# Patient Record
Sex: MALE | Race: WHITE | NOT HISPANIC OR LATINO | Employment: FULL TIME | URBAN - METROPOLITAN AREA
[De-identification: names, ages, dates, MRNs, and addresses within clinical notes are randomized per-mention and may not be internally consistent; named-entity substitution may affect disease eponyms.]

---

## 2017-01-19 ENCOUNTER — LAB VISIT (OUTPATIENT)
Dept: LAB | Facility: HOSPITAL | Age: 52
End: 2017-01-19
Attending: NURSE PRACTITIONER
Payer: COMMERCIAL

## 2017-01-19 DIAGNOSIS — E78.49 OTHER HYPERLIPIDEMIA: ICD-10-CM

## 2017-01-19 LAB
ALBUMIN SERPL BCP-MCNC: 3.9 G/DL
ALP SERPL-CCNC: 65 U/L
ALT SERPL W/O P-5'-P-CCNC: 32 U/L
ANION GAP SERPL CALC-SCNC: 6 MMOL/L
AST SERPL-CCNC: 24 U/L
BILIRUB SERPL-MCNC: 0.8 MG/DL
BUN SERPL-MCNC: 14 MG/DL
CALCIUM SERPL-MCNC: 9 MG/DL
CHLORIDE SERPL-SCNC: 109 MMOL/L
CHOLEST/HDLC SERPL: 3.8 {RATIO}
CO2 SERPL-SCNC: 27 MMOL/L
CREAT SERPL-MCNC: 1 MG/DL
EST. GFR  (AFRICAN AMERICAN): >60 ML/MIN/1.73 M^2
EST. GFR  (NON AFRICAN AMERICAN): >60 ML/MIN/1.73 M^2
GLUCOSE SERPL-MCNC: 102 MG/DL
HDL/CHOLESTEROL RATIO: 26.1 %
HDLC SERPL-MCNC: 157 MG/DL
HDLC SERPL-MCNC: 41 MG/DL
LDLC SERPL CALC-MCNC: 102.2 MG/DL
NONHDLC SERPL-MCNC: 116 MG/DL
POTASSIUM SERPL-SCNC: 4.9 MMOL/L
PROT SERPL-MCNC: 6.7 G/DL
SODIUM SERPL-SCNC: 142 MMOL/L
TRIGL SERPL-MCNC: 69 MG/DL

## 2017-01-19 PROCEDURE — 80053 COMPREHEN METABOLIC PANEL: CPT

## 2017-01-19 PROCEDURE — 80061 LIPID PANEL: CPT

## 2017-01-19 PROCEDURE — 36415 COLL VENOUS BLD VENIPUNCTURE: CPT

## 2017-01-23 ENCOUNTER — TELEPHONE (OUTPATIENT)
Dept: CARDIOLOGY | Facility: CLINIC | Age: 52
End: 2017-01-23

## 2017-01-23 NOTE — TELEPHONE ENCOUNTER
Please notify Mr Jerrod labs drawn last week was stable, however LDL cholesterol was mildy elevated above target. Heart healthy diet, continue medications, follow in clinic in 3 months.

## 2017-01-23 NOTE — TELEPHONE ENCOUNTER
Pt returned my call. The patient has been notified of this information and all questions answered.

## 2017-04-04 ENCOUNTER — OFFICE VISIT (OUTPATIENT)
Dept: INTERNAL MEDICINE | Facility: CLINIC | Age: 52
End: 2017-04-04
Payer: COMMERCIAL

## 2017-04-04 ENCOUNTER — LAB VISIT (OUTPATIENT)
Dept: LAB | Facility: HOSPITAL | Age: 52
End: 2017-04-04
Attending: FAMILY MEDICINE
Payer: COMMERCIAL

## 2017-04-04 VITALS
WEIGHT: 189.38 LBS | HEIGHT: 70 IN | OXYGEN SATURATION: 97 % | SYSTOLIC BLOOD PRESSURE: 124 MMHG | DIASTOLIC BLOOD PRESSURE: 80 MMHG | HEART RATE: 87 BPM | BODY MASS INDEX: 27.11 KG/M2 | TEMPERATURE: 98 F

## 2017-04-04 DIAGNOSIS — E78.5 HYPERLIPIDEMIA, UNSPECIFIED HYPERLIPIDEMIA TYPE: ICD-10-CM

## 2017-04-04 DIAGNOSIS — I25.10 CORONARY ARTERY DISEASE INVOLVING NATIVE CORONARY ARTERY OF NATIVE HEART WITHOUT ANGINA PECTORIS: ICD-10-CM

## 2017-04-04 DIAGNOSIS — Z00.00 ANNUAL PHYSICAL EXAM: Primary | ICD-10-CM

## 2017-04-04 DIAGNOSIS — Z12.11 COLON CANCER SCREENING: ICD-10-CM

## 2017-04-04 DIAGNOSIS — Z00.00 ANNUAL PHYSICAL EXAM: ICD-10-CM

## 2017-04-04 DIAGNOSIS — L73.9 FOLLICULITIS OF PERINEUM: ICD-10-CM

## 2017-04-04 LAB
BASOPHILS # BLD AUTO: 0.04 K/UL
BASOPHILS NFR BLD: 0.6 %
DIFFERENTIAL METHOD: NORMAL
EOSINOPHIL # BLD AUTO: 0.4 K/UL
EOSINOPHIL NFR BLD: 5.5 %
ERYTHROCYTE [DISTWIDTH] IN BLOOD BY AUTOMATED COUNT: 13 %
HCT VFR BLD AUTO: 46.1 %
HGB BLD-MCNC: 15.4 G/DL
LYMPHOCYTES # BLD AUTO: 2 K/UL
LYMPHOCYTES NFR BLD: 29.2 %
MCH RBC QN AUTO: 30.6 PG
MCHC RBC AUTO-ENTMCNC: 33.4 %
MCV RBC AUTO: 92 FL
MONOCYTES # BLD AUTO: 0.5 K/UL
MONOCYTES NFR BLD: 7.2 %
NEUTROPHILS # BLD AUTO: 3.9 K/UL
NEUTROPHILS NFR BLD: 57.4 %
PLATELET # BLD AUTO: 254 K/UL
PMV BLD AUTO: 9.7 FL
RBC # BLD AUTO: 5.03 M/UL
WBC # BLD AUTO: 6.78 K/UL

## 2017-04-04 PROCEDURE — 80053 COMPREHEN METABOLIC PANEL: CPT

## 2017-04-04 PROCEDURE — 99999 PR PBB SHADOW E&M-EST. PATIENT-LVL V: CPT | Mod: PBBFAC,,,

## 2017-04-04 PROCEDURE — 99396 PREV VISIT EST AGE 40-64: CPT | Mod: 25,S$GLB,, | Performed by: FAMILY MEDICINE

## 2017-04-04 PROCEDURE — 85025 COMPLETE CBC W/AUTO DIFF WBC: CPT

## 2017-04-04 PROCEDURE — 99214 OFFICE O/P EST MOD 30 MIN: CPT | Mod: S$GLB,,, | Performed by: FAMILY MEDICINE

## 2017-04-04 PROCEDURE — 36415 COLL VENOUS BLD VENIPUNCTURE: CPT

## 2017-04-04 PROCEDURE — 1160F RVW MEDS BY RX/DR IN RCRD: CPT | Mod: S$GLB,,, | Performed by: FAMILY MEDICINE

## 2017-04-04 RX ORDER — AZITHROMYCIN 500 MG/1
TABLET, FILM COATED ORAL
Refills: 0 | COMMUNITY
Start: 2017-03-08 | End: 2017-04-04

## 2017-04-04 NOTE — MR AVS SNAPSHOT
[facility] Internal Medicine  [address]  [place] [address]  Phone: [phone]  Fax: [phone]                  Delon Elder   2017 10:40 AM   Office Visit    Description:  Male : 1965   Provider:  Hillary Dalal MD   Department:  [facility] - Internal Medicine           Reason for Visit     Establish Care           Diagnoses this Visit        Comments    Annual physical exam    -  Primary     Coronary artery disease involving native coronary artery of native heart without angina pectoris         Hyperlipidemia, unspecified hyperlipidemia type         Colon cancer screening         Folliculitis of perineum                To Do List           Future Appointments        Provider Department Dept Phone    2017 1:15 PM LAB, SAME DAY [facility] [phone]    2017 8:00 AM Kadeem Napier MD [facility] Cardiology [phone]      Goals (5 Years of Data)     None      Follow-Up and Disposition     Return in 1 year (on 2018).      [facility]     [facility] -  Assistance  Unless otherwise directed by your provider, please contact [facility], our nurse care line that is available for  assistance.     Registered nurses in the [facility] provide: appointment scheduling, clinical advisement, health education, and other advisory services.  Call: [phone] (toll free)               Medications           Message regarding Medications     Verify the changes and/or additions to your medication regime listed below are the same as discussed with your clinician today.  If any of these changes or additions are incorrect, please notify your healthcare provider.        STOP taking these medications     azithromycin (ZITHROMAX) 500 MG tablet TK 1 T PO QD           Verify that the below list of medications is an accurate representation of the medications you are currently taking.  If none reported, the  "list may be blank. If incorrect, please contact your healthcare provider. Carry this list with you in case of emergency.           Current Medications     aspirin (ECOTRIN) 81 MG EC tablet Take 1 tablet (81 mg total) by mouth once daily.    atorvastatin (LIPITOR) 40 MG tablet Take 0.5 tablets (20 mg total) by mouth every evening.    DIPHENHYDRAMINE HCL (BENADRYL ORAL) Take by mouth.           Clinical Reference Information           Your Vitals Were     BP Pulse Temp Height Weight SpO2    124/80 (BP Location: Left arm) 87 98 °F (36.7 °C) (Tympanic) 5' 9.5" (1.765 m) 85.9 kg (189 lb 6 oz) 97%    BMI                27.56 kg/m2          Blood Pressure          Most Recent Value    BP  124/80      Allergies as of 4/4/2017     Phenytoin Sodium Extended    Sulfa (Sulfonamide Antibiotics)      Immunizations Administered on Date of Encounter - 4/4/2017     None      Orders Placed During Today's Visit      Normal Orders This Visit    Ambulatory referral to Cardiology     Case request GI: COLONOSCOPY     Future Labs/Procedures Expected by Expires    CBC auto differential  4/4/2017 6/3/2018    Recurring Lab Work Interval Expires    Comprehensive metabolic panel  Every 12 Months until 8/20/2044 8/20/2044      Instructions      Atorvastatin tablets  What is this medicine?  ATORVASTATIN (a TORE va sta tin) is known as a HMG-CoA reductase inhibitor or 'statin'. It lowers the level of cholesterol and triglycerides in the blood. This drug may also reduce the risk of heart attack, stroke, or other health problems in patients with risk factors for heart disease. Diet and lifestyle changes are often used with this drug.  How should I use this medicine?  Take this medicine by mouth with a glass of water. Follow the directions on the prescription label. You can take this medicine with or without food. Take your doses at regular intervals. Do not take your medicine more often than directed.  Talk to your pediatrician regarding the use of " this medicine in children. While this drug may be prescribed for children as young as 10 years old for selected conditions, precautions do apply.  What side effects may I notice from receiving this medicine?  Side effects that you should report to your doctor or health care professional as soon as possible:  · allergic reactions like skin rash, itching or hives, swelling of the face, lips, or tongue  · dark urine  · fever  · joint pain  · muscle cramps, pain  · redness, blistering, peeling or loosening of the skin, including inside the mouth  · trouble passing urine or change in the amount of urine  · unusually weak or tired  · yellowing of eyes or skin  Side effects that usually do not require medical attention (report to your doctor or health care professional if they continue or are bothersome):  · constipation  · heartburn  · stomach gas, pain, upset  What may interact with this medicine?  Do not take this medicine with any of the following medications:  · red yeast rice  · telaprevir  · telithromycin  · voriconazole  This medicine may also interact with the following medications:  · alcohol  · antiviral medicines for HIV or AIDS  · boceprevir  · certain antibiotics like clarithromycin, erythromycin, troleandomycin  · certain medicines for cholesterol like fenofibrate or gemfibrozil  · cimetidine  · clarithromycin  · colchicine  · cyclosporine  · digoxin  · female hormones, like estrogens or progestins and birth control pills  · grapefruit juice  · medicines for fungal infections like fluconazole, itraconazole, ketoconazole  · niacin  · rifampin  · spironolactone  What if I miss a dose?  If you miss a dose, take it as soon as you can. If it is almost time for your next dose, take only that dose. Do not take double or extra doses.  Where should I keep my medicine?  Keep out of the reach of children.  Store at room temperature between 20 to 25 degrees C (68 to 77 degrees F). Throw away any unused medicine after  the expiration date.  What should I tell my health care provider before I take this medicine?  They need to know if you have any of these conditions:  · frequently drink alcoholic beverages  · history of stroke, TIA  · kidney disease  · liver disease  · muscle aches or weakness  · other medical condition  · an unusual or allergic reaction to atorvastatin, other medicines, foods, dyes, or preservatives  · pregnant or trying to get pregnant  · breast-feeding  What should I watch for while using this medicine?  Visit your doctor or health care professional for regular check-ups. You may need regular tests to make sure your liver is working properly.  Tell your doctor or health care professional right away if you get any unexplained muscle pain, tenderness, or weakness, especially if you also have a fever and tiredness. Your doctor or health care professional may tell you to stop taking this medicine if you develop muscle problems. If your muscle problems do not go away after stopping this medicine, contact your health care professional.  This drug is only part of a total heart-health program. Your doctor or a dietician can suggest a low-cholesterol and low-fat diet to help. Avoid alcohol and smoking, and keep a proper exercise schedule.  Do not use this drug if you are pregnant or breast-feeding. Serious side effects to an unborn child or to an infant are possible. Talk to your doctor or pharmacist for more information.  This medicine may affect blood sugar levels. If you have diabetes, check with your doctor or health care professional before you change your diet or the dose of your diabetic medicine.  If you are going to have surgery tell your health care professional that you are taking this drug.  Date Last Reviewed:   NOTE:This sheet is a summary. It may not cover all possible information. If you have questions about this medicine, talk to your doctor, pharmacist, or health care provider. Copyright© 2016 [employer]        Prevention Guidelines, Men Ages 50 to 64  Screening tests and vaccines are an important part of managing your health. Health counseling is essential, too. Below are guidelines for these, for men ages 50 to 64. Talk with your healthcare provider to make sure youre up-to-date on what you need.  Screening Who needs it How often   Alcohol misuse All men in this age group At routine exams   Blood pressure All men in this age group Every 2 years if your blood pressure is less than 120/80 mm Hg; yearly if your systolic blood pressure is 120 to 139 mm Hg, or your diastolic blood pressure reading is 80 to 89 mm Hg   Colorectal cancer All men in this age group Flexible sigmoidoscopy every 5 years, or colonoscopy every 10 years, or double-contrast barium enema every 5 years; yearly fecal occult blood test or fecal immunochemical test; or a stool DNA test as often as your healthcare provider advises; talk with your healthcare provider about which tests are best for you   Depression All men in this age group At routine exams   Type 2 diabetes or prediabetes All adults beginning at age 45 and adults without symptoms at any age who are overweight or obese and have 1 or more other risk factors for diabetes At least every 3 years   Hepatitis C Men at increased risk for infection - talk with your healthcare provider At routine exams   High cholesterol or triglycerides All men in this age group At least every 5 years   HIV Men at increased risk for infection - talk with your healthcare provider At routine exams   Lung cancer Adults age 55 to 80 who have smoked Yearly screening in smokers with 30 pack-year history of smoking or who quit within 15 years   Obesity All men in this age group At routine exams   Prostate cancer Starting at age 45, talk to healthcare provider about risks and benefits of digital rectal exam (VIRAJ) and prostate-specific antigen (PSA) screening1 At routine exams   Syphilis Men at increased risk  for infection - talk with your healthcare provider At routine exams   Tuberculosis Men at increased risk for infection - talk with your healthcare provider Ask your healthcare provider   Vision All men in this age group Ask your healthcare provider   Vaccine Who needs it How often   Chickenpox (varicella) All men in this age group who have no record of this infection or vaccine 2 doses; second dose should be given at least 4 weeks after the first dose   Hepatitis A Men at increased risk for infection - talk with your healthcare provider 2 doses given at least 6 months apart   Hepatitis B Men at increased risk for infection - talk with your healthcare provider 3 doses over 6 months; second dose should be given 1 month after the first dose; the third dose should be given at least 2 months after the second dose and at least 4 months after the first dose   Haemophilus influenzae Type B (HIB) Men at increased risk for infection - talk with your healthcare provider 1 to 3 doses   Influenza (flu) All men in this age group Once a year   Measles, mumps, rubella (MMR) Men in this age group through their late 50s who have no record of these infections or vaccines 1 or 2 dose; ask your healthcare provider   Meningococcal Men at increased risk for infection - talk with your healthcare provider 1 or more doses   Pneumococcal conjugate vaccine (PCV13) and pneumococcal polysaccharide vaccine (PPSV23) Men at increased risk for infection - talk with your healthcare provider PCV13: 1 dose ages 19 to 65 (protects against 13 types of pneumococcal bacteria)     PPSV23: 1 to 2 doses through age 64, or 1 dose at 65 or older (protects against 23 types of pneumococcal bacteria)      Tetanus/diphtheria/  pertussis (Td/Tdap) booster All men in this age group Td every 10 years, or a one-time dose of Tdap instead of a Td booster after age 18, then Td every 10 years   Zoster All men ages 60 and older 1 dose   Counseling Who needs it How often    Diet and exercise Men who are overweight or obese When diagnosed, and then at routine exams   Sexually transmitted infection prevention Men at increased risk for infection - talk with your healthcare provider At routine exams   Use of daily aspirin Men in this age group at risk for cardiovascular health problems At routine exams   Use of tobacco and the health affects it can cause All men in this age group Every visit   [address] [place] [address] [facility]  Date Last Reviewed: 3/30/2015  © 4641-1317 [employer]. [address] [place] [address], [place] [address]. All rights reserved. This information is not intended as a substitute for professional medical care. Always follow your healthcare professional's instructions.        Atorvastatin tablets  What is this medicine?  ATORVASTATIN (a TORE va sta tin) is known as a HMG-CoA reductase inhibitor or 'statin'. It lowers the level of cholesterol and triglycerides in the blood. This drug may also reduce the risk of heart attack, stroke, or other health problems in patients with risk factors for heart disease. Diet and lifestyle changes are often used with this drug.  How should I use this medicine?  Take this medicine by mouth with a glass of water. Follow the directions on the prescription label. You can take this medicine with or without food. Take your doses at regular intervals. Do not take your medicine more often than directed.  Talk to your pediatrician regarding the use of this medicine in children. While this drug may be prescribed for children as young as 10 years old for selected conditions, precautions do apply.  What side effects may I notice from receiving this medicine?  Side effects that you should report to your doctor or health care professional as soon as possible:  · allergic reactions like skin rash, itching or hives, swelling of the face, lips, or tongue  · dark urine  · fever  · joint pain  · muscle cramps, pain  · redness, blistering,  peeling or loosening of the skin, including inside the mouth  · trouble passing urine or change in the amount of urine  · unusually weak or tired  · yellowing of eyes or skin  Side effects that usually do not require medical attention (report to your doctor or health care professional if they continue or are bothersome):  · constipation  · heartburn  · stomach gas, pain, upset  What may interact with this medicine?  Do not take this medicine with any of the following medications:  · red yeast rice  · telaprevir  · telithromycin  · voriconazole  This medicine may also interact with the following medications:  · alcohol  · antiviral medicines for HIV or AIDS  · boceprevir  · certain antibiotics like clarithromycin, erythromycin, troleandomycin  · certain medicines for cholesterol like fenofibrate or gemfibrozil  · cimetidine  · clarithromycin  · colchicine  · cyclosporine  · digoxin  · female hormones, like estrogens or progestins and birth control pills  · grapefruit juice  · medicines for fungal infections like fluconazole, itraconazole, ketoconazole  · niacin  · rifampin  · spironolactone  What if I miss a dose?  If you miss a dose, take it as soon as you can. If it is almost time for your next dose, take only that dose. Do not take double or extra doses.  Where should I keep my medicine?  Keep out of the reach of children.  Store at room temperature between 20 to 25 degrees C (68 to 77 degrees F). Throw away any unused medicine after the expiration date.  What should I tell my health care provider before I take this medicine?  They need to know if you have any of these conditions:  · frequently drink alcoholic beverages  · history of stroke, TIA  · kidney disease  · liver disease  · muscle aches or weakness  · other medical condition  · an unusual or allergic reaction to atorvastatin, other medicines, foods, dyes, or preservatives  · pregnant or trying to get pregnant  · breast-feeding  What should I watch for  while using this medicine?  Visit your doctor or health care professional for regular check-ups. You may need regular tests to make sure your liver is working properly.  Tell your doctor or health care professional right away if you get any unexplained muscle pain, tenderness, or weakness, especially if you also have a fever and tiredness. Your doctor or health care professional may tell you to stop taking this medicine if you develop muscle problems. If your muscle problems do not go away after stopping this medicine, contact your health care professional.  This drug is only part of a total heart-health program. Your doctor or a dietician can suggest a low-cholesterol and low-fat diet to help. Avoid alcohol and smoking, and keep a proper exercise schedule.  Do not use this drug if you are pregnant or breast-feeding. Serious side effects to an unborn child or to an infant are possible. Talk to your doctor or pharmacist for more information.  This medicine may affect blood sugar levels. If you have diabetes, check with your doctor or health care professional before you change your diet or the dose of your diabetic medicine.  If you are going to have surgery tell your health care professional that you are taking this drug.  Date Last Reviewed:   NOTE:This sheet is a summary. It may not cover all possible information. If you have questions about this medicine, talk to your doctor, pharmacist, or health care provider. Copyright© 2016 [employer]             Language Assistance Services     ATTENTION: Language assistance services are available, free of charge. Please call [phone].      ATENCIÓN: Si habla shayne, tiene a bonilla disposición servicios gratuitos de asistencia lingüística. Llame al [phone].     [facility] Ý: N?u b?n nói Ti?ng Vi?t, có các d?ch v? h? tr? ngôn ng? mi?n phí dành cho b?n. G?i s? [phone].         [facility] complies with applicable Federal civil rights laws and does  not discriminate on the basis of race, color, national origin, age, disability, or sex.

## 2017-04-04 NOTE — PROGRESS NOTES
Delon Elder  04/04/2017  [id number]    Primary Doctor No  Patient Care Team:  Primary Doctor No as PCP - General  Harpreet Dougherty NP as Nurse Practitioner (Cardiology)  Kadeem Napier MD as Consulting Physician (Cardiology)    Has the patient seen any provider outside of the network since the last visit ? (no). If yes, HIPPA forms completed and records requested.      Visit Type:Flu like symptoms and annual exam to establish with PCP.    Chief Complaint:  Chief Complaint   Patient presents with    Establish Care   Sore throat and mild cold symptoms    History of Present Illness:  51 year old male, here to establish care with PCP, complianing of mild sore throat, cold symptoms which are improving, last week. .  He denies fever. He has no associated chills or vomiting. It has had a sensation that lymph nodes were enlarged. It has Not been worsening, but resolving. He has not taken any OTC meds for this.    He has history of CAD, with Stent in [facility] in 2015. Followed by Cards, Dr. Napier. He had labs in Jan of 2017, that shows , slighlty above goal He is on Lipitor. HAs muscle aches with the 40 mg dose, so does cut in half.  He is on an ASA a day. He is not on beta blocker due to lower resting heart rate in past.   Denies any Chest pain or SOB.    He also does report some scrotal swelling. He has some erythema, red bump, has been there 2 days to the area. He has slight burning on the skin. He denies nocturia. No family history of prostate cancer. No dysuria or discharge. Not currently sexually active, has not been for a couple years. Tried OTC antibacterial cream.     He has a history of substance abuse, did crack cocaine and ETOH, is recovered for over 7 years. Reports no IVD use. He reports screening for HIV and Hep C in rehab.      He has never had colon screen. Denies family history of colon cancer. He does have some constipation, which metamucil does help. No melena.     Denies Depression or  Anxiety.    Exercises regularly without anginal symptoms.       Screening Questionnaires:  Do you smoke ( no ) Smoking cessation counseled. See Assessment and Plan for any additional orders. - Quit 7 years ago.     In the last two weeks how often have you felt down, depressed, or hopeless ( no )    In the last two weeks how often have you had little interest or pleasure in doing  (no )    In the last two weeks how often have you been bothered by the following problems:  1. Feeling nervous, anxious, or on edge ( no )    2. Not being able to stop or control worrying ( no)    3. Worrying too much about different things ( no)    4. Trouble relaxing ( no )    5. Being so restless that it is hard to sit still  (no )    6. Becoming easily annoyed or irritable (no)    7. Feeling afraid as if something awful might happen (no )    How often do you have a drink containing Alcohol? denied     Do you exercise  (yes ) moderately active    Do you take a baby Aspirin daily ( yes)    Do you have an advance directive ( no ) The patient was given information regarding Living Will/Durable Power-of-    The following were reviewed: Active problem list, medication list, allergies, family history, social history, and Health Maintenance.     History:  Past Medical History:   Diagnosis Date    Acute coronary syndrome     CAD (coronary artery disease) 4/13/2015    Hyperlipidemia      Past Surgical History:   Procedure Laterality Date    APPENDECTOMY      CARDIAC CATHETERIZATION      CORONARY ANGIOPLASTY       Family History   Problem Relation Age of Onset    Hyperlipidemia Mother     Hyperlipidemia Father     Melanoma Neg Hx     Psoriasis Neg Hx     Lupus Neg Hx      Social History     Social History    Marital status: Single     Spouse name: N/A    Number of children: N/A    Years of education: N/A     Occupational History    electronic tech Other     [employer]     Social History Main Topics    Smoking status: Former  Smoker     Years: 0.00     Quit date: 4/13/2010    Smokeless tobacco: Never Used    Alcohol use No    Drug use: No    Sexual activity: Not on file     Other Topics Concern    Not on file     Social History Narrative    No narrative on file     Patient Active Problem List   Diagnosis    Acute MI, inferior wall, initial episode of care    ST elevation myocardial infarction (STEMI) of inferior wall    S/P coronary artery stent placement    Former smoker    Hyperlipidemia    CAD (coronary artery disease)    MI, old    Exposure to second hand smoke at work     Review of patient's allergies indicates:   Allergen Reactions    Phenytoin sodium extended Other (See Comments)    Sulfa (sulfonamide antibiotics)        Health Maintenance  Health Maintenance Topics with due status: Not Due       Topic Last Completion Date    Lipid Panel 01/19/2017     Health Maintenance Due   Topic Date Due    TETANUS VACCINE  07/16/1984    Colonoscopy  07/16/2016    Influenza Vaccine  08/01/2016       Medications:  Current Outpatient Prescriptions on File Prior to Visit   Medication Sig Dispense Refill    aspirin (ECOTRIN) 81 MG EC tablet Take 1 tablet (81 mg total) by mouth once daily. 30 tablet 11    atorvastatin (LIPITOR) 40 MG tablet Take 0.5 tablets (20 mg total) by mouth every evening. 30 tablet 6     No current facility-administered medications on file prior to visit.        Medications have been reviewed and reconciled with patient at visit today.    Barriers to medications present (no )    Adverse reactions to current medications (yes)    Over the counter medications reviewed (Yes) and if needed added to active Medication list.    Exam:  Vitals:    04/04/17 1040   BP: 124/80   Pulse: 87   Temp: 98 °F (36.7 °C)     Weight: 85.9 kg (189 lb 6 oz)   Body mass index is 27.56 kg/(m^2).    Review of Systems   Constitutional: Negative for chills, fever, malaise/fatigue and weight loss.   HENT: Negative for ear pain.     Eyes: Negative for discharge and redness.   Respiratory: Negative for cough, hemoptysis and sputum production.    Cardiovascular: Negative for chest pain, palpitations and orthopnea.   Gastrointestinal: Positive for constipation. Negative for abdominal pain, blood in stool, heartburn, nausea and vomiting.   Genitourinary: Negative for dysuria, flank pain, hematuria and urgency.        Scrotal Tenderness to skin   Musculoskeletal: Negative for myalgias and neck pain.   Skin: Negative for itching and rash.   Neurological: Negative for dizziness, tingling, focal weakness, weakness and headaches.   Psychiatric/Behavioral: Negative for depression and suicidal ideas. The patient is not nervous/anxious and does not have insomnia.        Physical Exam   Constitutional: He is oriented to person, place, and time. He appears well-developed and well-nourished. No distress.   HENT:   Head: Normocephalic and atraumatic.   Right Ear: External ear normal.   Left Ear: External ear normal.   Nose: Nose normal.   Mouth/Throat: Oropharynx is clear and moist. No oropharyngeal exudate.   Eyes: EOM are normal. Pupils are equal, round, and reactive to light. Left eye exhibits no discharge.   Neck: Normal range of motion. Neck supple. No tracheal deviation present. No thyromegaly present.   Cardiovascular: Normal rate, regular rhythm, S1 normal and S2 normal.   No extrasystoles are present. Exam reveals no friction rub.    No murmur heard.  Pulmonary/Chest: Breath sounds normal. No respiratory distress. He has no decreased breath sounds. He has no wheezes. He has no rhonchi.   Abdominal: Hernia confirmed negative in the right inguinal area and confirmed negative in the left inguinal area.   Genitourinary: Testes normal and penis normal. Cremasteric reflex is present. Right testis shows no swelling. Left testis shows no swelling. Circumcised.         Lymphadenopathy:     He has no cervical adenopathy.   Neurological: He is alert and  oriented to person, place, and time. No cranial nerve deficit.   Psychiatric: He has a normal mood and affect. His behavior is normal. Judgment and thought content normal.   Nursing note and vitals reviewed.      Laboratory Reviewed: (Yes)  Lab Results   Component Value Date    WBC 8.74 03/24/2015    HGB 14.8 03/24/2015    HCT 42.6 03/24/2015     03/24/2015    CHOL 157 01/19/2017    TRIG 69 01/19/2017    HDL 41 01/19/2017    ALT 32 01/19/2017    AST 24 01/19/2017     01/19/2017    K 4.9 01/19/2017     01/19/2017    CREATININE 1.0 01/19/2017    BUN 14 01/19/2017    CO2 27 01/19/2017    INR 0.9 03/22/2015       Assessment:  The primary encounter diagnosis was Annual physical exam. Diagnoses of Coronary artery disease involving native coronary artery of native heart without angina pectoris, Hyperlipidemia, unspecified hyperlipidemia type, Colon cancer screening, and Folliculitis of perineum were also pertinent to this visit.    Plan:  Annual physical exam  -     CBC auto differential; Future; Expected date: 4/4/17  -     Comprehensive metabolic panel; Standing   Lipids reviewed and up to date   Plan to schedule colon screen   Flu vaccine to be completed next season   Discussed health maintenance requirements for age related screening with patient    Coronary artery disease involving native coronary artery of native heart without angina pectoris  -     Comprehensive metabolic panel; Standing  -     Ambulatory referral to Cardiology   Lipids up to date   On ASA daily   Did not tolerate Beta Blocker due to lower pulse rate in past per chart review with Cards notes   No chest pain, lipids improved. Muscle aches with regular dose of lipitor   Diet counseled    Hyperlipidemia, unspecified hyperlipidemia type  -     Comprehensive metabolic panel; Standing  -     Ambulatory referral to Cardiology   Lipids up to date   Reviewed in chart   Checking LFTs for liver monitorin    Colon cancer screening  -     Case  request GI: COLONOSCOPY    OTC Fiber to help keep regular   Better diet      Folliculitis of perineum-   Self limited   OTC neosporin counseled daily   Return if not improved    Other orders  -     Cancel: Case request GI: COLONOSCOPY          Follow up: Return in 1 year (on 4/4/2018).      Care Plan/Goals: Reviewed Yes  Goals     None        Wellness Goals    Health Maintenance   Topic Date Due    TETANUS VACCINE  07/16/1984    Colonoscopy  07/16/2016    Influenza Vaccine  08/01/2016    Lipid Panel  01/19/2022     Health Maintenance Due   Topic Date Due    TETANUS VACCINE  07/16/1984    Colonoscopy  07/16/2016    Influenza Vaccine  08/01/2016       Goal of Exercise is 150 min a week. Walking/running    Encouraged to follow balanced diet, with daily servings of fresh fruits and vegetables.     Make sure to schedule all health maintenance appointments to achieve health care goals.  Annual check up is due every 12 months with your designated Provider/Care Team.     Patient under care of /Care Manager (No) Referral Sent (No)        After visit summary printed and given to patient upon discharge.  Patient goals and care plan are included in After visit summary.

## 2017-04-04 NOTE — PATIENT INSTRUCTIONS
Atorvastatin tablets  What is this medicine?  ATORVASTATIN (a TORE va sta tin) is known as a HMG-CoA reductase inhibitor or 'statin'. It lowers the level of cholesterol and triglycerides in the blood. This drug may also reduce the risk of heart attack, stroke, or other health problems in patients with risk factors for heart disease. Diet and lifestyle changes are often used with this drug.  How should I use this medicine?  Take this medicine by mouth with a glass of water. Follow the directions on the prescription label. You can take this medicine with or without food. Take your doses at regular intervals. Do not take your medicine more often than directed.  Talk to your pediatrician regarding the use of this medicine in children. While this drug may be prescribed for children as young as 10 years old for selected conditions, precautions do apply.  What side effects may I notice from receiving this medicine?  Side effects that you should report to your doctor or health care professional as soon as possible:  · allergic reactions like skin rash, itching or hives, swelling of the face, lips, or tongue  · dark urine  · fever  · joint pain  · muscle cramps, pain  · redness, blistering, peeling or loosening of the skin, including inside the mouth  · trouble passing urine or change in the amount of urine  · unusually weak or tired  · yellowing of eyes or skin  Side effects that usually do not require medical attention (report to your doctor or health care professional if they continue or are bothersome):  · constipation  · heartburn  · stomach gas, pain, upset  What may interact with this medicine?  Do not take this medicine with any of the following medications:  · red yeast rice  · telaprevir  · telithromycin  · voriconazole  This medicine may also interact with the following medications:  · alcohol  · antiviral medicines for HIV or AIDS  · boceprevir  · certain antibiotics like clarithromycin, erythromycin,  troleandomycin  · certain medicines for cholesterol like fenofibrate or gemfibrozil  · cimetidine  · clarithromycin  · colchicine  · cyclosporine  · digoxin  · female hormones, like estrogens or progestins and birth control pills  · grapefruit juice  · medicines for fungal infections like fluconazole, itraconazole, ketoconazole  · niacin  · rifampin  · spironolactone  What if I miss a dose?  If you miss a dose, take it as soon as you can. If it is almost time for your next dose, take only that dose. Do not take double or extra doses.  Where should I keep my medicine?  Keep out of the reach of children.  Store at room temperature between 20 to 25 degrees C (68 to 77 degrees F). Throw away any unused medicine after the expiration date.  What should I tell my health care provider before I take this medicine?  They need to know if you have any of these conditions:  · frequently drink alcoholic beverages  · history of stroke, TIA  · kidney disease  · liver disease  · muscle aches or weakness  · other medical condition  · an unusual or allergic reaction to atorvastatin, other medicines, foods, dyes, or preservatives  · pregnant or trying to get pregnant  · breast-feeding  What should I watch for while using this medicine?  Visit your doctor or health care professional for regular check-ups. You may need regular tests to make sure your liver is working properly.  Tell your doctor or health care professional right away if you get any unexplained muscle pain, tenderness, or weakness, especially if you also have a fever and tiredness. Your doctor or health care professional may tell you to stop taking this medicine if you develop muscle problems. If your muscle problems do not go away after stopping this medicine, contact your health care professional.  This drug is only part of a total heart-health program. Your doctor or a dietician can suggest a low-cholesterol and low-fat diet to help. Avoid alcohol and smoking, and keep  a proper exercise schedule.  Do not use this drug if you are pregnant or breast-feeding. Serious side effects to an unborn child or to an infant are possible. Talk to your doctor or pharmacist for more information.  This medicine may affect blood sugar levels. If you have diabetes, check with your doctor or health care professional before you change your diet or the dose of your diabetic medicine.  If you are going to have surgery tell your health care professional that you are taking this drug.  Date Last Reviewed:   NOTE:This sheet is a summary. It may not cover all possible information. If you have questions about this medicine, talk to your doctor, pharmacist, or health care provider. Copyright© 2016 [employer]        Prevention Guidelines, Men Ages 50 to 64  Screening tests and vaccines are an important part of managing your health. Health counseling is essential, too. Below are guidelines for these, for men ages 50 to 64. Talk with your healthcare provider to make sure youre up-to-date on what you need.  Screening Who needs it How often   Alcohol misuse All men in this age group At routine exams   Blood pressure All men in this age group Every 2 years if your blood pressure is less than 120/80 mm Hg; yearly if your systolic blood pressure is 120 to 139 mm Hg, or your diastolic blood pressure reading is 80 to 89 mm Hg   Colorectal cancer All men in this age group Flexible sigmoidoscopy every 5 years, or colonoscopy every 10 years, or double-contrast barium enema every 5 years; yearly fecal occult blood test or fecal immunochemical test; or a stool DNA test as often as your healthcare provider advises; talk with your healthcare provider about which tests are best for you   Depression All men in this age group At routine exams   Type 2 diabetes or prediabetes All adults beginning at age 45 and adults without symptoms at any age who are overweight or obese and have 1 or more other risk factors for diabetes At  least every 3 years   Hepatitis C Men at increased risk for infection - talk with your healthcare provider At routine exams   High cholesterol or triglycerides All men in this age group At least every 5 years   HIV Men at increased risk for infection - talk with your healthcare provider At routine exams   Lung cancer Adults age 55 to 80 who have smoked Yearly screening in smokers with 30 pack-year history of smoking or who quit within 15 years   Obesity All men in this age group At routine exams   Prostate cancer Starting at age 45, talk to healthcare provider about risks and benefits of digital rectal exam (VIRAJ) and prostate-specific antigen (PSA) screening1 At routine exams   Syphilis Men at increased risk for infection - talk with your healthcare provider At routine exams   Tuberculosis Men at increased risk for infection - talk with your healthcare provider Ask your healthcare provider   Vision All men in this age group Ask your healthcare provider   Vaccine Who needs it How often   Chickenpox (varicella) All men in this age group who have no record of this infection or vaccine 2 doses; second dose should be given at least 4 weeks after the first dose   Hepatitis A Men at increased risk for infection - talk with your healthcare provider 2 doses given at least 6 months apart   Hepatitis B Men at increased risk for infection - talk with your healthcare provider 3 doses over 6 months; second dose should be given 1 month after the first dose; the third dose should be given at least 2 months after the second dose and at least 4 months after the first dose   Haemophilus influenzae Type B (HIB) Men at increased risk for infection - talk with your healthcare provider 1 to 3 doses   Influenza (flu) All men in this age group Once a year   Measles, mumps, rubella (MMR) Men in this age group through their late 50s who have no record of these infections or vaccines 1 or 2 dose; ask your healthcare provider   Meningococcal  Men at increased risk for infection - talk with your healthcare provider 1 or more doses   Pneumococcal conjugate vaccine (PCV13) and pneumococcal polysaccharide vaccine (PPSV23) Men at increased risk for infection - talk with your healthcare provider PCV13: 1 dose ages 19 to 65 (protects against 13 types of pneumococcal bacteria)     PPSV23: 1 to 2 doses through age 64, or 1 dose at 65 or older (protects against 23 types of pneumococcal bacteria)      Tetanus/diphtheria/  pertussis (Td/Tdap) booster All men in this age group Td every 10 years, or a one-time dose of Tdap instead of a Td booster after age 18, then Td every 10 years   Zoster All men ages 60 and older 1 dose   Counseling Who needs it How often   Diet and exercise Men who are overweight or obese When diagnosed, and then at routine exams   Sexually transmitted infection prevention Men at increased risk for infection - talk with your healthcare provider At routine exams   Use of daily aspirin Men in this age group at risk for cardiovascular health problems At routine exams   Use of tobacco and the health affects it can cause All men in this age group Every visit   [address] [place] [address] [facility]  Date Last Reviewed: 3/30/2015  © 2920-5215 [employer]. [address] [place] [address]. All rights reserved. This information is not intended as a substitute for professional medical care. Always follow your healthcare professional's instructions.        Atorvastatin tablets  What is this medicine?  ATORVASTATIN (a TORE va sta tin) is known as a HMG-CoA reductase inhibitor or 'statin'. It lowers the level of cholesterol and triglycerides in the blood. This drug may also reduce the risk of heart attack, stroke, or other health problems in patients with risk factors for heart disease. Diet and lifestyle changes are often used with this drug.  How should I use this medicine?  Take this medicine by mouth with a glass of water. Follow  the directions on the prescription label. You can take this medicine with or without food. Take your doses at regular intervals. Do not take your medicine more often than directed.  Talk to your pediatrician regarding the use of this medicine in children. While this drug may be prescribed for children as young as 10 years old for selected conditions, precautions do apply.  What side effects may I notice from receiving this medicine?  Side effects that you should report to your doctor or health care professional as soon as possible:  · allergic reactions like skin rash, itching or hives, swelling of the face, lips, or tongue  · dark urine  · fever  · joint pain  · muscle cramps, pain  · redness, blistering, peeling or loosening of the skin, including inside the mouth  · trouble passing urine or change in the amount of urine  · unusually weak or tired  · yellowing of eyes or skin  Side effects that usually do not require medical attention (report to your doctor or health care professional if they continue or are bothersome):  · constipation  · heartburn  · stomach gas, pain, upset  What may interact with this medicine?  Do not take this medicine with any of the following medications:  · red yeast rice  · telaprevir  · telithromycin  · voriconazole  This medicine may also interact with the following medications:  · alcohol  · antiviral medicines for HIV or AIDS  · boceprevir  · certain antibiotics like clarithromycin, erythromycin, troleandomycin  · certain medicines for cholesterol like fenofibrate or gemfibrozil  · cimetidine  · clarithromycin  · colchicine  · cyclosporine  · digoxin  · female hormones, like estrogens or progestins and birth control pills  · grapefruit juice  · medicines for fungal infections like fluconazole, itraconazole, ketoconazole  · niacin  · rifampin  · spironolactone  What if I miss a dose?  If you miss a dose, take it as soon as you can. If it is almost time for your next dose, take only  that dose. Do not take double or extra doses.  Where should I keep my medicine?  Keep out of the reach of children.  Store at room temperature between 20 to 25 degrees C (68 to 77 degrees F). Throw away any unused medicine after the expiration date.  What should I tell my health care provider before I take this medicine?  They need to know if you have any of these conditions:  · frequently drink alcoholic beverages  · history of stroke, TIA  · kidney disease  · liver disease  · muscle aches or weakness  · other medical condition  · an unusual or allergic reaction to atorvastatin, other medicines, foods, dyes, or preservatives  · pregnant or trying to get pregnant  · breast-feeding  What should I watch for while using this medicine?  Visit your doctor or health care professional for regular check-ups. You may need regular tests to make sure your liver is working properly.  Tell your doctor or health care professional right away if you get any unexplained muscle pain, tenderness, or weakness, especially if you also have a fever and tiredness. Your doctor or health care professional may tell you to stop taking this medicine if you develop muscle problems. If your muscle problems do not go away after stopping this medicine, contact your health care professional.  This drug is only part of a total heart-health program. Your doctor or a dietician can suggest a low-cholesterol and low-fat diet to help. Avoid alcohol and smoking, and keep a proper exercise schedule.  Do not use this drug if you are pregnant or breast-feeding. Serious side effects to an unborn child or to an infant are possible. Talk to your doctor or pharmacist for more information.  This medicine may affect blood sugar levels. If you have diabetes, check with your doctor or health care professional before you change your diet or the dose of your diabetic medicine.  If you are going to have surgery tell your health care professional that you are taking this  drug.  Date Last Reviewed:   NOTE:This sheet is a summary. It may not cover all possible information. If you have questions about this medicine, talk to your doctor, pharmacist, or health care provider. Copyright© 2016 [employer]

## 2017-04-05 ENCOUNTER — TELEPHONE (OUTPATIENT)
Dept: INTERNAL MEDICINE | Facility: CLINIC | Age: 52
End: 2017-04-05

## 2017-04-05 LAB
ALBUMIN SERPL BCP-MCNC: 4 G/DL
ALP SERPL-CCNC: 72 U/L
ALT SERPL W/O P-5'-P-CCNC: 26 U/L
ANION GAP SERPL CALC-SCNC: 9 MMOL/L
AST SERPL-CCNC: 25 U/L
BILIRUB SERPL-MCNC: 0.6 MG/DL
BUN SERPL-MCNC: 14 MG/DL
CALCIUM SERPL-MCNC: 9.3 MG/DL
CHLORIDE SERPL-SCNC: 106 MMOL/L
CO2 SERPL-SCNC: 27 MMOL/L
CREAT SERPL-MCNC: 1.1 MG/DL
EST. GFR  (AFRICAN AMERICAN): >60 ML/MIN/1.73 M^2
EST. GFR  (NON AFRICAN AMERICAN): >60 ML/MIN/1.73 M^2
GLUCOSE SERPL-MCNC: 85 MG/DL
POTASSIUM SERPL-SCNC: 4.3 MMOL/L
PROT SERPL-MCNC: 6.9 G/DL
SODIUM SERPL-SCNC: 142 MMOL/L

## 2017-04-05 NOTE — TELEPHONE ENCOUNTER
----- Message from Hillary Dalal MD sent at 4/5/2017  2:46 PM CDT -----  Results are normal.  Notify patient  He has been scheduled with Cards, and should be expecting call for colon screen.    If no issues follow up in one year for annual check up.  Otherwise see me prn.

## 2017-04-05 NOTE — TELEPHONE ENCOUNTER
Patient/mother notified of results and recommendations. Advised to call the office as needed, patient verbalized understanding.

## 2017-04-06 RX ORDER — SODIUM, POTASSIUM,MAG SULFATES 17.5-3.13G
SOLUTION, RECONSTITUTED, ORAL ORAL
Qty: 354 ML | Refills: 0 | Status: SHIPPED | OUTPATIENT
Start: 2017-04-06 | End: 2019-03-16

## 2017-09-14 RX ORDER — ATORVASTATIN CALCIUM 40 MG/1
20 TABLET, FILM COATED ORAL NIGHTLY
Qty: 30 TABLET | Refills: 3 | Status: SHIPPED | OUTPATIENT
Start: 2017-09-14 | End: 2018-02-24 | Stop reason: SDUPTHER

## 2017-09-14 NOTE — TELEPHONE ENCOUNTER
----- Message from Jerrica Mead sent at 9/14/2017 10:55 AM CDT -----  Contact: pt mother jennifer  Please call pt mother at [phone]. Pt is needing a refill on atorvastatin 40 mg called to jeannine at [phone].

## 2017-09-14 NOTE — TELEPHONE ENCOUNTER
----- Message from Jayden Urbina sent at 9/14/2017 11:02 AM CDT -----  REFILLS:   Patient is requesting a medication refill.   RX name: atorvastatin  Strength: 40 mg  Directions: n/a   Pharmacy name:   [id number] - [place] - [address] AT [facility] [place]  [address]  [place] [address]  Phone: [phone] Fax: [phone]  :  Phone number where pt can be reached: Grace, mother, [phone]

## 2017-09-14 NOTE — TELEPHONE ENCOUNTER
Spoke with pt's mother, she says that he works out of town  I notified her that he can phone us to schedule an appt for his 11 yr colon screen, says that she will pass on the message  Notified rx sent

## 2017-09-15 RX ORDER — ATORVASTATIN CALCIUM 40 MG/1
TABLET, FILM COATED ORAL
Qty: 30 TABLET | Refills: 11 | Status: SHIPPED | OUTPATIENT
Start: 2017-09-15 | End: 2019-04-17 | Stop reason: SDUPTHER

## 2017-12-18 DIAGNOSIS — Z12.11 SCREEN FOR COLON CANCER: Primary | ICD-10-CM

## 2018-01-09 ENCOUNTER — OFFICE VISIT (OUTPATIENT)
Dept: FAMILY MEDICINE | Facility: CLINIC | Age: 53
End: 2018-01-09
Payer: COMMERCIAL

## 2018-01-09 ENCOUNTER — TELEPHONE (OUTPATIENT)
Dept: INTERNAL MEDICINE | Facility: CLINIC | Age: 53
End: 2018-01-09

## 2018-01-09 VITALS
OXYGEN SATURATION: 95 % | HEIGHT: 70 IN | WEIGHT: 192.81 LBS | DIASTOLIC BLOOD PRESSURE: 88 MMHG | TEMPERATURE: 97 F | SYSTOLIC BLOOD PRESSURE: 134 MMHG | HEART RATE: 78 BPM | BODY MASS INDEX: 27.6 KG/M2

## 2018-01-09 DIAGNOSIS — R07.9 CHEST PAIN, UNSPECIFIED TYPE: ICD-10-CM

## 2018-01-09 DIAGNOSIS — R68.89 FLU-LIKE SYMPTOMS: Primary | ICD-10-CM

## 2018-01-09 LAB
CTP QC/QA: YES
FLUAV AG NPH QL: NEGATIVE
FLUBV AG NPH QL: NEGATIVE

## 2018-01-09 PROCEDURE — 99999 PR PBB SHADOW E&M-EST. PATIENT-LVL III: CPT | Mod: PBBFAC,,, | Performed by: FAMILY MEDICINE

## 2018-01-09 PROCEDURE — 99214 OFFICE O/P EST MOD 30 MIN: CPT | Mod: S$GLB,,, | Performed by: FAMILY MEDICINE

## 2018-01-09 PROCEDURE — 87804 INFLUENZA ASSAY W/OPTIC: CPT | Mod: 59,QW,S$GLB, | Performed by: FAMILY MEDICINE

## 2018-01-09 PROCEDURE — 93005 ELECTROCARDIOGRAM TRACING: CPT | Mod: S$GLB,,, | Performed by: FAMILY MEDICINE

## 2018-01-09 PROCEDURE — 93010 ELECTROCARDIOGRAM REPORT: CPT | Mod: S$GLB,,, | Performed by: INTERNAL MEDICINE

## 2018-01-09 NOTE — PROGRESS NOTES
"Subjective:       Patient ID: Delon Elder is a 52 y.o. male.    Chief Complaint: Back Pain (across the shoulders) and Chest Congestion      HPI Comments:       Current Outpatient Prescriptions:     aspirin (ECOTRIN) 81 MG EC tablet, Take 1 tablet (81 mg total) by mouth once daily., Disp: 30 tablet, Rfl: 11    atorvastatin (LIPITOR) 40 MG tablet, TAKE ONE-HALF TABLET BY MOUTH EVERY EVENING, Disp: 30 tablet, Rfl: 11    DIPHENHYDRAMINE HCL (BENADRYL ORAL), Take by mouth., Disp: , Rfl:     sodium,potassium,mag sulfates (SUPREP BOWEL PREP KIT) 17.5-3.13-1.6 gram SolR, Use as directed., Disp: 354 mL, Rfl: 0      This my first time seeing this patient.  He has history of coronary artery disease and a history of alcoholism, but is alcohol free.  He is somewhat vague in his history telling.    Today he was concerned about pain in his neck and posterior shoulders bilaterally, as well as a "burning" feeling in his chest.  He has not had any exertional chest pain or angina.  He does not have frequent GERD symptoms.  Right now he is only complaining of aches in his neck and shoulders.  Over the last 24-36 hours she's had some sore throat, cough, sneezing, headache, chills, and sinus headache.  He's had some influenza contacts at work.  Today he called a "Tele-doc" who is was concerned that his burning sensation in his chest could represent pneumonia, and was told to go see a doctor in  person.  He's not taken anything for his symptoms so far       Back Pain   This is a new problem. The current episode started yesterday. The problem occurs constantly. The problem is unchanged. Pain location: Neck and posterior shoulders. The quality of the pain is described as aching. The pain is mild. Associated symptoms include chest pain, a fever and headaches. Pertinent negatives include no abdominal pain or weakness. He has tried nothing for the symptoms.     Review of Systems   Constitutional: Positive for fever. Negative for activity " "change and appetite change.   HENT: Positive for congestion, postnasal drip, rhinorrhea and sinus pressure. Negative for sore throat.    Respiratory: Negative for cough, chest tightness and shortness of breath.    Cardiovascular: Positive for chest pain.   Gastrointestinal: Negative for abdominal pain, diarrhea and nausea.   Genitourinary: Negative for difficulty urinating.   Musculoskeletal: Positive for neck pain and neck stiffness. Negative for arthralgias, back pain and myalgias.   Neurological: Positive for headaches. Negative for dizziness and weakness.   Hematological: Negative for adenopathy.       Objective:      Vitals:    01/09/18 1429   BP: 134/88   Pulse: 78   Temp: 96.5 °F (35.8 °C)   TempSrc: Tympanic   SpO2: 95%   Weight: 87.5 kg (192 lb 12.7 oz)   Height: 5' 9.5" (1.765 m)     Physical Exam   Constitutional: He is oriented to person, place, and time. He appears well-developed and well-nourished. No distress.   HENT:   Head: Normocephalic.   Mouth/Throat: No oropharyngeal exudate.   Neck: Normal range of motion and full passive range of motion without pain. Neck supple. Muscular tenderness present. No spinous process tenderness present. No neck rigidity. No thyromegaly present.   Cardiovascular: Normal rate, regular rhythm and normal heart sounds.    No murmur heard.  Pulmonary/Chest: Effort normal and breath sounds normal. He has no wheezes. He has no rales.   Abdominal: Soft. He exhibits no distension.   Musculoskeletal: He exhibits no edema.        Arms:  Lymphadenopathy:     He has no cervical adenopathy.   Neurological: He is alert and oriented to person, place, and time.   Skin: Skin is warm and dry. He is not diaphoretic.   Psychiatric: He has a normal mood and affect. His behavior is normal. Judgment and thought content normal.   Nursing note and vitals reviewed.      Assessment:       1. Flu-like symptoms    2. 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  No acute changes.  Pain is not typical for angina  Orders:  -     EKG 12-lead

## 2018-01-09 NOTE — TELEPHONE ENCOUNTER
----- Message from Nicole Pearl sent at 1/9/2018  1:52 PM CST -----  Contact: Grace Elder - Mother  She states that patient is congested, cough and his chest is hurting.  She wants to know if you could work him in today.   Call her at [phone].                                            garnica

## 2018-01-09 NOTE — TELEPHONE ENCOUNTER
Left message on pt's mother's VM that  is not in clinic on Tuesday's or Fridays and that we can put him on tomorrows schedule. And if his sxs get worse to take him to the nearest urgent care or E.R.

## 2018-02-25 RX ORDER — ATORVASTATIN CALCIUM 40 MG/1
TABLET, FILM COATED ORAL
Qty: 30 TABLET | Refills: 0 | Status: SHIPPED | OUTPATIENT
Start: 2018-02-25 | End: 2018-02-26 | Stop reason: SDUPTHER

## 2018-02-26 RX ORDER — ATORVASTATIN CALCIUM 40 MG/1
TABLET, FILM COATED ORAL
Qty: 30 TABLET | Refills: 0 | Status: SHIPPED | OUTPATIENT
Start: 2018-02-26 | End: 2018-04-13 | Stop reason: SDUPTHER

## 2018-02-26 NOTE — TELEPHONE ENCOUNTER
----- Message from Melania Canales sent at 2/26/2018 11:39 AM CST -----  Contact: pt's mom  She's calling to get a refill..    1. What is the name of the medication you are requesting? Atorvastatin  2. What is the dose? 40 mg  3. How do you take the medication? Orally, topically, etc? orally  4. How often do you take this medication? Once daily  5. Do you need a 30 day or 90 day supply? 30  6. How many refills are you requesting? 1  7. What is your preferred pharmacy and location of the pharmacy? [facility] on [address]-[phone]  8. Who can we contact with further questions? [phone] (home)

## 2018-04-09 ENCOUNTER — TELEPHONE (OUTPATIENT)
Dept: INTERNAL MEDICINE | Facility: CLINIC | Age: 53
End: 2018-04-09

## 2018-04-09 NOTE — TELEPHONE ENCOUNTER
----- Message from Iraida Dalal sent at 4/6/2018  2:30 PM CDT -----  Contact: pt [phone]  Pt called requesting a call back from Dr Dalal's office.     He showed for his annual visit apt (scheduled 1 year ago)   Notes edited by Dr Dalal in Jan 2018 for pt to see colon  (colonoscopy? Orders?)    Pt checked in for his 9 am apt yesterday (at 8:57 am)  Ginny Lanedros canceled check in at (9:45 am)  Actual apt was not cancelled, only the check in.    Pt can be reached at pt [phone]  Thanks  yina

## 2018-04-10 PROBLEM — S83.242A TEAR OF MEDIAL MENISCUS OF LEFT KNEE, CURRENT: Status: ACTIVE | Noted: 2018-02-27

## 2018-04-10 RX ORDER — NAPROXEN SODIUM 220 MG/1
81 TABLET, FILM COATED ORAL
COMMUNITY
End: 2024-02-23

## 2018-04-10 NOTE — PROGRESS NOTES
Delon Elder  04/11/2018  [id number]    Hillary Dalal MD  Patient Care Team:  Hillary Dalal MD as PCP - General (Family Medicine)  Harpreet Dougherty NP as Nurse Practitioner (Cardiology)  Kadeem Napier MD as Consulting Physician (Cardiology)  Has the patient seen any provider outside of the [facility] network since the last visit? (no). If yes, HIPPA forms completed and records requested.        Visit Type:Annual    Chief Complaint:  Chief Complaint   Patient presents with    Annual Exam       History of Present Illness:  He has history of CAD, with Stent in RCA in 2015. Followed by Cards, Dr. Napier.  He is on Lipitor. HAs muscle aches with the 40 mg dose, so does cut in half.  He is on an ASA a day. He is not on beta blocker due to lower resting heart rate in past.   Denies any Chest pain or SOB. Denies anginal sx.     Denies family history of colon cancer. His dad did have polyps. He does have some constipation, which metamucil does help. No melena.     Recently saw Ortho at [facility], records in care everywhere for medical meniscus of left knee tear. Did not need sx. He had MRI, but has resolved and has most function.    He said his Colon screen is not covered.     History:  Past Medical History:   Diagnosis Date    Acute coronary syndrome     CAD (coronary artery disease) 4/13/2015    Hyperlipidemia      Past Surgical History:   Procedure Laterality Date    APPENDECTOMY      CARDIAC CATHETERIZATION      CORONARY ANGIOPLASTY       Family History   Problem Relation Age of Onset    Hyperlipidemia Mother     Hyperlipidemia Father     Melanoma Neg Hx     Psoriasis Neg Hx     Lupus Neg Hx      Social History     Social History    Marital status: Single     Spouse name: N/A    Number of children: N/A    Years of education: N/A     Occupational History     Other     [employer]     Social History Main Topics    Smoking status: Former Smoker     Years: 0.00     Quit date: 4/13/2010     Smokeless tobacco: Never Used    Alcohol use No    Drug use: No    Sexual activity: Not on file     Other Topics Concern    Not on file     Social History Narrative    No narrative on file     Patient Active Problem List   Diagnosis    S/P coronary artery stent placement    Former smoker    Hyperlipidemia    CAD (coronary artery disease)    MI, old    Exposure to second hand smoke at work    Tear of medial meniscus of left knee, current     Review of patient's allergies indicates:   Allergen Reactions    Phenytoin sodium extended Other (See Comments)    Sulfa (sulfonamide antibiotics)        The following were reviewed at this visit: active problem list, medication list, allergies, family history, social history, and health maintenance.    Medications:  Current Outpatient Prescriptions on File Prior to Visit   Medication Sig Dispense Refill    atorvastatin (LIPITOR) 40 MG tablet TAKE ONE-HALF TABLET BY MOUTH EVERY EVENING 30 tablet 11    DIPHENHYDRAMINE HCL (BENADRYL ORAL) Take by mouth.      atorvastatin (LIPITOR) 40 MG tablet TAKE 1/2 TABLET(20 MG) BY MOUTH EVERY EVENING 30 tablet 0    sodium,potassium,mag sulfates (SUPREP BOWEL PREP KIT) 17.5-3.13-1.6 gram SolR Use as directed. 354 mL 0     No current facility-administered medications on file prior to visit.        Medications have been reviewed and reconciled with patient at this visit.  Barriers to medications present (no)    Adverse reactions to current medications (no)    Over the counter medications reviewed (Yes ), and if needed added to active Medication list at this visit.     Exam:  Wt Readings from Last 3 Encounters:   04/11/18 87.1 kg (192 lb 0.3 oz)   01/09/18 87.5 kg (192 lb 12.7 oz)   04/04/17 85.9 kg (189 lb 6 oz)     Temp Readings from Last 3 Encounters:   04/11/18 97.4 °F (36.3 °C) (Tympanic)   01/09/18 96.5 °F (35.8 °C) (Tympanic)   04/04/17 98 °F (36.7 °C) (Tympanic)     BP Readings from Last 3 Encounters:   04/11/18 118/84    01/09/18 134/88   04/04/17 124/80     Pulse Readings from Last 3 Encounters:   04/11/18 80   01/09/18 78   04/04/17 87     Body mass index is 27.95 kg/m².    Review of Systems   Constitutional: Negative.  Negative for chills and fever.   HENT: Negative.  Negative for congestion, sinus pain and sore throat.    Eyes: Negative for blurred vision and double vision.   Respiratory: Negative for cough, sputum production, shortness of breath and wheezing.    Cardiovascular: Negative for chest pain, palpitations and leg swelling.   Gastrointestinal: Negative for abdominal pain, constipation, diarrhea, heartburn, nausea and vomiting.   Genitourinary: Negative.    Musculoskeletal: Negative.    Skin: Negative.  Negative for rash.   Neurological: Negative.    Endo/Heme/Allergies: Negative.  Negative for polydipsia. Does not bruise/bleed easily.   Psychiatric/Behavioral: Negative for depression and substance abuse.         Physical Exam   Constitutional: He is oriented to person, place, and time. He appears well-developed and well-nourished. No distress.   HENT:   Head: Normocephalic and atraumatic.   Right Ear: External ear normal.   Left Ear: External ear normal.   Nose: Nose normal.   Mouth/Throat: Oropharynx is clear and moist. No oropharyngeal exudate.   Eyes: Conjunctivae and EOM are normal. Pupils are equal, round, and reactive to light. Right eye exhibits no discharge. Left eye exhibits no discharge.   Neck: Normal range of motion. Neck supple. No thyromegaly present.   Cardiovascular: Normal rate, regular rhythm, normal heart sounds and intact distal pulses.    No murmur heard.  Pulmonary/Chest: Effort normal and breath sounds normal. No respiratory distress. He has no wheezes.   Abdominal: Soft. Bowel sounds are normal. He exhibits no distension and no mass. There is no tenderness.   Musculoskeletal: Normal range of motion. He exhibits no edema.   Lymphadenopathy:     He has no cervical adenopathy.   Neurological: He  is alert and oriented to person, place, and time. No cranial nerve deficit.   Skin: Capillary refill takes less than 2 seconds. He is not diaphoretic.   Psychiatric: He has a normal mood and affect. His behavior is normal. Judgment and thought content normal.   Nursing note and vitals reviewed.      Laboratory Reviewed ({Yes)  Lab Results   Component Value Date    WBC 6.78 04/04/2017    HGB 15.4 04/04/2017    HCT 46.1 04/04/2017     04/04/2017    CHOL 157 01/19/2017    TRIG 69 01/19/2017    HDL 41 01/19/2017    ALT 26 04/04/2017    AST 25 04/04/2017     04/04/2017    K 4.3 04/04/2017     04/04/2017    CREATININE 1.1 04/04/2017    BUN 14 04/04/2017    CO2 27 04/04/2017    INR 0.9 03/22/2015       Assessment:  The primary encounter diagnosis was Annual physical exam. Diagnoses of Hyperlipidemia, unspecified hyperlipidemia type, MI, old, Coronary artery disease involving native coronary artery of native heart without angina pectoris, and Screen for colon cancer were also pertinent to this visit.     Plan     Annual physical exam  -     CBC auto differential; Future; Expected date: 04/10/2018  -     Comprehensive metabolic panel; Future; Expected date: 04/10/2018  -     Lipid panel; Future; Expected date: 04/10/2018    Hyperlipidemia, unspecified hyperlipidemia type   Lipitor   CHECK LFts   LDL goal <70    MI, old   On ASA daily   No chest pain    Coronary artery disease involving native coronary artery of native heart without angina pectoris    On ASA, Lipitor   BP in goal range    Screen for colon cancer   Colon screen is not covered   [employer]Kit ordered        Care Plan/Goals: Reviewed  (N/A)  Goals     None          Follow up: Follow-up in about 1 year (around 4/11/2019) for Medication Recheck.    After visit summary was printed and given to patient upon discharge today.  Patient goals and care plan are included in After Visit Summary.

## 2018-04-11 ENCOUNTER — OFFICE VISIT (OUTPATIENT)
Dept: INTERNAL MEDICINE | Facility: CLINIC | Age: 53
End: 2018-04-11
Payer: COMMERCIAL

## 2018-04-11 VITALS
WEIGHT: 192 LBS | HEIGHT: 70 IN | OXYGEN SATURATION: 96 % | TEMPERATURE: 97 F | DIASTOLIC BLOOD PRESSURE: 84 MMHG | BODY MASS INDEX: 27.49 KG/M2 | HEART RATE: 80 BPM | SYSTOLIC BLOOD PRESSURE: 118 MMHG

## 2018-04-11 DIAGNOSIS — I25.10 CORONARY ARTERY DISEASE INVOLVING NATIVE CORONARY ARTERY OF NATIVE HEART WITHOUT ANGINA PECTORIS: ICD-10-CM

## 2018-04-11 DIAGNOSIS — Z00.00 ANNUAL PHYSICAL EXAM: Primary | ICD-10-CM

## 2018-04-11 DIAGNOSIS — I25.2 MI, OLD: ICD-10-CM

## 2018-04-11 DIAGNOSIS — E78.5 HYPERLIPIDEMIA, UNSPECIFIED HYPERLIPIDEMIA TYPE: ICD-10-CM

## 2018-04-11 DIAGNOSIS — Z12.11 SCREEN FOR COLON CANCER: ICD-10-CM

## 2018-04-11 PROCEDURE — 99999 PR PBB SHADOW E&M-EST. PATIENT-LVL III: CPT | Mod: PBBFAC,,, | Performed by: FAMILY MEDICINE

## 2018-04-11 PROCEDURE — 99396 PREV VISIT EST AGE 40-64: CPT | Mod: S$GLB,,, | Performed by: FAMILY MEDICINE

## 2018-04-11 NOTE — PATIENT INSTRUCTIONS
Understanding Coronary Artery Disease (CAD)    To understand coronary artery disease (CAD), you need to know how your heart works. Your heart is a muscle that pumps blood throughout your body. To work right, your heart needs a steady supply of oxygen. It gets this oxygen from blood supplied by the coronary arteries.     Healthy artery   Healthy artery. When a coronary artery is healthy and has no blockages, blood flows through easily. Healthy arteries can easily supply the oxygen-rich blood your heart needs.     Damaged artery   Damaged artery. Coronary artery disease begins when damage to the artery lining leads to the buildup of fat-like substances and cholesterol along the artery wall. This is called plaque. This damage could be caused by things like high blood pressure or smoking. This plaque buildup begins to narrow the arteries carrying blood to the heart. This is called atherosclerosis,     Narrowed artery   Narrowed artery. As more plaque builds up, your artery has trouble supplying blood to your heart muscle when it needs it most, such as during exercise. You may not feel any symptoms when this happens. Or you may feel angina--pressure, tightness, achiness, or pain in your chest, jaw, neck, back, or arm.     Blocked artery   Blocked artery. A piece of plaque may break off and completely block the artery. Or a blood clot may plug the narrowed artery. When this happens, blood flow is blocked from reaching the heart. Without oxygen-rich blood, part of the heart muscle becomes damaged and stops working. You may feel crushing pressure or pain in or around your chest. This is a heart attack (acute myocardial infarction, or AMI) and is a medical emergency.     Date Last Reviewed: 3/28/2016  © 5989-2562 [employer]. [address] [place] [address], [place] [address]. All rights reserved. This information is not intended as a substitute for professional medical care. Always follow your healthcare  professional's instructions.

## 2018-04-12 ENCOUNTER — LAB VISIT (OUTPATIENT)
Dept: LAB | Facility: HOSPITAL | Age: 53
End: 2018-04-12
Attending: FAMILY MEDICINE
Payer: COMMERCIAL

## 2018-04-12 DIAGNOSIS — Z00.00 ANNUAL PHYSICAL EXAM: ICD-10-CM

## 2018-04-12 LAB
ALBUMIN SERPL BCP-MCNC: 3.8 G/DL
ALP SERPL-CCNC: 72 U/L
ALT SERPL W/O P-5'-P-CCNC: 27 U/L
ANION GAP SERPL CALC-SCNC: 7 MMOL/L
AST SERPL-CCNC: 20 U/L
BASOPHILS # BLD AUTO: 0.05 K/UL
BASOPHILS NFR BLD: 0.9 %
BILIRUB SERPL-MCNC: 0.5 MG/DL
BUN SERPL-MCNC: 16 MG/DL
CALCIUM SERPL-MCNC: 9.1 MG/DL
CHLORIDE SERPL-SCNC: 109 MMOL/L
CHOLEST SERPL-MCNC: 176 MG/DL
CHOLEST/HDLC SERPL: 4.3 {RATIO}
CO2 SERPL-SCNC: 24 MMOL/L
CREAT SERPL-MCNC: 0.9 MG/DL
DIFFERENTIAL METHOD: NORMAL
EOSINOPHIL # BLD AUTO: 0.2 K/UL
EOSINOPHIL NFR BLD: 4.2 %
ERYTHROCYTE [DISTWIDTH] IN BLOOD BY AUTOMATED COUNT: 11.9 %
EST. GFR  (AFRICAN AMERICAN): >60 ML/MIN/1.73 M^2
EST. GFR  (NON AFRICAN AMERICAN): >60 ML/MIN/1.73 M^2
GLUCOSE SERPL-MCNC: 110 MG/DL
HCT VFR BLD AUTO: 43.2 %
HDLC SERPL-MCNC: 41 MG/DL
HDLC SERPL: 23.3 %
HGB BLD-MCNC: 14.8 G/DL
IMM GRANULOCYTES # BLD AUTO: 0.01 K/UL
IMM GRANULOCYTES NFR BLD AUTO: 0.2 %
LDLC SERPL CALC-MCNC: 118.8 MG/DL
LYMPHOCYTES # BLD AUTO: 1.4 K/UL
LYMPHOCYTES NFR BLD: 24.7 %
MCH RBC QN AUTO: 30.7 PG
MCHC RBC AUTO-ENTMCNC: 34.3 G/DL
MCV RBC AUTO: 90 FL
MONOCYTES # BLD AUTO: 0.6 K/UL
MONOCYTES NFR BLD: 9.6 %
NEUTROPHILS # BLD AUTO: 3.5 K/UL
NEUTROPHILS NFR BLD: 60.4 %
NONHDLC SERPL-MCNC: 135 MG/DL
NRBC BLD-RTO: 0 /100 WBC
PLATELET # BLD AUTO: 258 K/UL
PMV BLD AUTO: 9.7 FL
POTASSIUM SERPL-SCNC: 4.4 MMOL/L
PROT SERPL-MCNC: 6.6 G/DL
RBC # BLD AUTO: 4.82 M/UL
SODIUM SERPL-SCNC: 140 MMOL/L
TRIGL SERPL-MCNC: 81 MG/DL
WBC # BLD AUTO: 5.74 K/UL

## 2018-04-12 PROCEDURE — 80053 COMPREHEN METABOLIC PANEL: CPT

## 2018-04-12 PROCEDURE — 85025 COMPLETE CBC W/AUTO DIFF WBC: CPT

## 2018-04-12 PROCEDURE — 80061 LIPID PANEL: CPT

## 2018-04-12 PROCEDURE — 36415 COLL VENOUS BLD VENIPUNCTURE: CPT

## 2018-04-13 RX ORDER — ATORVASTATIN CALCIUM 40 MG/1
TABLET, FILM COATED ORAL
Qty: 30 TABLET | Refills: 5 | Status: SHIPPED | OUTPATIENT
Start: 2018-04-13 | End: 2019-03-16 | Stop reason: SDUPTHER

## 2018-04-18 ENCOUNTER — LAB VISIT (OUTPATIENT)
Dept: LAB | Facility: HOSPITAL | Age: 53
End: 2018-04-18
Attending: FAMILY MEDICINE
Payer: COMMERCIAL

## 2018-04-18 DIAGNOSIS — Z12.11 SCREEN FOR COLON CANCER: ICD-10-CM

## 2018-04-18 LAB — HEMOCCULT STL QL IA: NEGATIVE

## 2018-04-18 PROCEDURE — 82274 ASSAY TEST FOR BLOOD FECAL: CPT

## 2018-07-11 ENCOUNTER — TELEPHONE (OUTPATIENT)
Dept: INTERNAL MEDICINE | Facility: CLINIC | Age: 53
End: 2018-07-11

## 2018-07-11 RX ORDER — ACYCLOVIR 800 MG/1
TABLET ORAL
Refills: 0 | COMMUNITY
Start: 2018-06-05 | End: 2018-07-11

## 2018-07-11 RX ORDER — VALACYCLOVIR HYDROCHLORIDE 500 MG/1
500 TABLET, FILM COATED ORAL DAILY
Qty: 30 TABLET | Refills: 2 | Status: SHIPPED | OUTPATIENT
Start: 2018-07-11 | End: 2019-03-16

## 2018-07-11 NOTE — TELEPHONE ENCOUNTER
Ask him if he is speaking of a Genital Herpetic outbreak or cold sores?  Genital Herpes that are recurrent can get on suppressive tx, however we don't tx cold sore that way.          Dr. Dalal

## 2018-07-11 NOTE — TELEPHONE ENCOUNTER
Patient wanted to see about getting on the meds that he could take daily for hpv. He has been having some stress and with the heat he has had an incident.

## 2018-07-11 NOTE — TELEPHONE ENCOUNTER
----- Message from Alysia Llamas sent at 7/11/2018  1:43 PM CDT -----  Contact: pt  Calling in regards to please give him a call back [phone]

## 2018-07-11 NOTE — TELEPHONE ENCOUNTER
----- Message from Alysia Llamas sent at 7/11/2018  1:43 PM CDT -----  Contact: pt  Calling in regards to please give him a call back [phone]

## 2018-07-11 NOTE — TELEPHONE ENCOUNTER
Daily suppression for multiple outbreak, valtrex 500 daily to prevent more.  I will send valtrex in.

## 2018-10-26 ENCOUNTER — OFFICE VISIT (OUTPATIENT)
Dept: DERMATOLOGY | Facility: CLINIC | Age: 53
End: 2018-10-26
Payer: COMMERCIAL

## 2018-10-26 DIAGNOSIS — R22.9 SUBCUTANEOUS NODULE: Primary | ICD-10-CM

## 2018-10-26 DIAGNOSIS — L82.1 SEBORRHEIC KERATOSES: ICD-10-CM

## 2018-10-26 PROCEDURE — 99213 OFFICE O/P EST LOW 20 MIN: CPT | Mod: 25,S$GLB,, | Performed by: STUDENT IN AN ORGANIZED HEALTH CARE EDUCATION/TRAINING PROGRAM

## 2018-10-26 PROCEDURE — 99999 PR PBB SHADOW E&M-EST. PATIENT-LVL II: CPT | Mod: PBBFAC,,, | Performed by: STUDENT IN AN ORGANIZED HEALTH CARE EDUCATION/TRAINING PROGRAM

## 2018-10-26 PROCEDURE — 88304 TISSUE EXAM BY PATHOLOGIST: CPT | Performed by: PATHOLOGY

## 2018-10-26 PROCEDURE — 11100 PR BIOPSY OF SKIN LESION: CPT | Mod: 59,S$GLB,, | Performed by: STUDENT IN AN ORGANIZED HEALTH CARE EDUCATION/TRAINING PROGRAM

## 2018-10-26 PROCEDURE — 17110 DESTRUCTION B9 LES UP TO 14: CPT | Mod: S$GLB,,, | Performed by: STUDENT IN AN ORGANIZED HEALTH CARE EDUCATION/TRAINING PROGRAM

## 2018-10-26 PROCEDURE — 88304 TISSUE EXAM BY PATHOLOGIST: CPT | Mod: 26,,, | Performed by: PATHOLOGY

## 2018-10-26 NOTE — PROGRESS NOTES
Subjective:       Patient ID:  Delon Elder is a 53 y.o. male who presents for   Chief Complaint   Patient presents with    Spot     c/o spots on head x 3 months      History of Present Illness: The patient presents with chief complaint of a skin lesion on the forehead.  Location: face  Duration: 3month  Signs/Symptoms: no itch   Prior treatments: none     He denies any history of NMSC or MM. Denies any family history of NMSC or MM.     Patient complains of lesion(s) - subcutaneous nodule  Location: right trunk  Duration: years  Symptoms: irritation  Relieving factors/Previous treatments: none            Review of Systems   Skin: Positive for activity-related sunscreen use. Negative for itching, rash, dry skin, daily sunscreen use and recent sunburn.   Hematologic/Lymphatic: Does not bruise/bleed easily.        Objective:    Physical Exam   Constitutional: He appears well-developed and well-nourished. No distress.   Neurological: He is alert and oriented to person, place, and time. He is not disoriented.   Psychiatric: He has a normal mood and affect.   Skin:   Areas Examined (abnormalities noted in diagram):   Scalp / Hair Palpated and Inspected  Head / Face Inspection Performed  Neck Inspection Performed  Chest / Axilla Inspection Performed  Abdomen Inspection Performed  RUE Inspected  LUE Inspection Performed                   Diagram Legend     Erythematous scaling macule/papule c/w actinic keratosis       Vascular papule c/w angioma      Pigmented verrucoid papule/plaque c/w seborrheic keratosis      Yellow umbilicated papule c/w sebaceous hyperplasia      Irregularly shaped tan macule c/w lentigo     1-2 mm smooth white papules consistent with Milia      Movable subcutaneous cyst with punctum c/w epidermal inclusion cyst      Subcutaneous movable cyst c/w pilar cyst      Firm pink to brown papule c/w dermatofibroma      Pedunculated fleshy papule(s) c/w skin tag(s)      Evenly pigmented macule c/w  junctional nevus     Mildly variegated pigmented, slightly irregular-bordered macule c/w mildly atypical nevus      Flesh colored to evenly pigmented papule c/w intradermal nevus       Pink pearly papule/plaque c/w basal cell carcinoma      Erythematous hyperkeratotic cursted plaque c/w SCC      Surgical scar with no sign of skin cancer recurrence      Open and closed comedones      Inflammatory papules and pustules      Verrucoid papule consistent consistent with wart     Erythematous eczematous patches and plaques     Dystrophic onycholytic nail with subungual debris c/w onychomycosis     Umbilicated papule    Erythematous-base heme-crusted tan verrucoid plaque consistent with inflamed seborrheic keratosis     Erythematous Silvery Scaling Plaque c/w Psoriasis     See annotation      Assessment / Plan:      Pathology Orders:     Normal Orders This Visit    Tissue Specimen To Pathology, Dermatology     Questions:    Directional Terms:  Other(comment)    Clinical information:  r/o dilated pore of rosa    Specific Site:  right trunk        Subcutaneous nodule  -     Tissue Specimen To Pathology, Dermatology    Punch biopsy procedure note:  Punch biopsy performed after verbal consent obtained. Area marked and prepped with alcohol. Approximately 1cc of 1% lidocaine with epinephrine injected. 5 mm disposable punch used to remove lesion. Hemostasis obtained and biopsy site closed with 4 nylon sutures. Wound care instructions reviewed with patient and handout given.      Seborrheic keratoses  These are benign inherited growths without a malignant potential. Reassurance given to patient. No treatment is necessary.     Cryosurgery procedure note:    Verbal consent from the patient is obtained including, but not limited to, risk of hypopigmentation/hyperpigmentation, scar, recurrence of lesion. Liquid nitrogen cryosurgery is applied to 1 lesions to produce a freeze injury. The patient is aware that blisters may form and is  instructed on wound care with gentle cleansing and use of vaseline ointment to keep moist until healed. The patient is supplied a handout on cryosurgery and is instructed to call if lesions do not completely resolve.         Follow-up in about 2 weeks (around 11/9/2018) for suture removal.

## 2018-11-08 ENCOUNTER — TELEPHONE (OUTPATIENT)
Dept: DERMATOLOGY | Facility: CLINIC | Age: 53
End: 2018-11-08

## 2018-11-08 NOTE — TELEPHONE ENCOUNTER
----- Message from Gilma Charles sent at 11/8/2018 12:53 PM CST -----  Contact: self [phone]  Returning call, please call back [phone].  Venkata Cabrera

## 2018-11-19 ENCOUNTER — TELEPHONE (OUTPATIENT)
Dept: INTERNAL MEDICINE | Facility: CLINIC | Age: 53
End: 2018-11-19

## 2018-11-19 NOTE — TELEPHONE ENCOUNTER
Called and spoke with patient. He called because he went to Urgent care on yesterday with sinus issues. He said he has been having a drip. He is concerned with it going into his lungs. Patient said that they sent him in an inhaler but he hasn't picked it up yet. He said they did tell him to follow up and he doesn't get any better soon. He was worried because he meeting in a [employer] basement and a lot of times the people that come to the meeting are sick so he wants to make sure he is ok.

## 2018-11-19 NOTE — TELEPHONE ENCOUNTER
Appt with VICKY AREVALO tomorrow for recheck.  But if urgent care saw him, I would suggest filling those medications and giving that a try.  He can be seen tomorrow in office for Urgent Care follow up.

## 2018-11-19 NOTE — TELEPHONE ENCOUNTER
----- Message from Padmini Aggarwal sent at 11/19/2018 12:17 PM CST -----  Contact: pt  Please call pt @ [phone] regarding After Hour Clinic visit from yesterday , pt states he need a Xray of lung.

## 2019-03-16 ENCOUNTER — OFFICE VISIT (OUTPATIENT)
Dept: INTERNAL MEDICINE | Facility: CLINIC | Age: 54
End: 2019-03-16
Payer: COMMERCIAL

## 2019-03-16 ENCOUNTER — LAB VISIT (OUTPATIENT)
Dept: LAB | Facility: HOSPITAL | Age: 54
End: 2019-03-16
Attending: FAMILY MEDICINE
Payer: COMMERCIAL

## 2019-03-16 VITALS
DIASTOLIC BLOOD PRESSURE: 90 MMHG | SYSTOLIC BLOOD PRESSURE: 126 MMHG | TEMPERATURE: 97 F | OXYGEN SATURATION: 97 % | HEIGHT: 70 IN | WEIGHT: 193.31 LBS | HEART RATE: 75 BPM | BODY MASS INDEX: 27.67 KG/M2

## 2019-03-16 DIAGNOSIS — Z95.5 S/P CORONARY ARTERY STENT PLACEMENT: Chronic | ICD-10-CM

## 2019-03-16 DIAGNOSIS — I25.10 CORONARY ARTERY DISEASE INVOLVING NATIVE CORONARY ARTERY OF NATIVE HEART WITHOUT ANGINA PECTORIS: Chronic | ICD-10-CM

## 2019-03-16 DIAGNOSIS — J01.00 ACUTE NON-RECURRENT MAXILLARY SINUSITIS: Primary | ICD-10-CM

## 2019-03-16 DIAGNOSIS — I25.2 MI, OLD: ICD-10-CM

## 2019-03-16 DIAGNOSIS — R03.0 ELEVATED BLOOD PRESSURE READING WITHOUT DIAGNOSIS OF HYPERTENSION: ICD-10-CM

## 2019-03-16 DIAGNOSIS — S83.242A TEAR OF MEDIAL MENISCUS OF LEFT KNEE, CURRENT, UNSPECIFIED TEAR TYPE, INITIAL ENCOUNTER: ICD-10-CM

## 2019-03-16 LAB
ALBUMIN SERPL BCP-MCNC: 3.5 G/DL
ALP SERPL-CCNC: 78 U/L
ALT SERPL W/O P-5'-P-CCNC: 30 U/L
ANION GAP SERPL CALC-SCNC: 6 MMOL/L
AST SERPL-CCNC: 20 U/L
BILIRUB SERPL-MCNC: 0.3 MG/DL
BUN SERPL-MCNC: 13 MG/DL
CALCIUM SERPL-MCNC: 9 MG/DL
CHLORIDE SERPL-SCNC: 106 MMOL/L
CHOLEST SERPL-MCNC: 154 MG/DL
CHOLEST/HDLC SERPL: 4.3 {RATIO}
CO2 SERPL-SCNC: 26 MMOL/L
CREAT SERPL-MCNC: 0.9 MG/DL
EST. GFR  (AFRICAN AMERICAN): >60 ML/MIN/1.73 M^2
EST. GFR  (NON AFRICAN AMERICAN): >60 ML/MIN/1.73 M^2
GLUCOSE SERPL-MCNC: 84 MG/DL
HDLC SERPL-MCNC: 36 MG/DL
HDLC SERPL: 23.4 %
LDLC SERPL CALC-MCNC: 84.6 MG/DL
NONHDLC SERPL-MCNC: 118 MG/DL
POTASSIUM SERPL-SCNC: 4.3 MMOL/L
PROT SERPL-MCNC: 6.5 G/DL
SODIUM SERPL-SCNC: 138 MMOL/L
TRIGL SERPL-MCNC: 167 MG/DL

## 2019-03-16 PROCEDURE — 99999 PR PBB SHADOW E&M-EST. PATIENT-LVL III: CPT | Mod: PBBFAC,,, | Performed by: FAMILY MEDICINE

## 2019-03-16 PROCEDURE — 80053 COMPREHEN METABOLIC PANEL: CPT

## 2019-03-16 PROCEDURE — 36415 COLL VENOUS BLD VENIPUNCTURE: CPT

## 2019-03-16 PROCEDURE — 99999 PR PBB SHADOW E&M-EST. PATIENT-LVL III: ICD-10-PCS | Mod: PBBFAC,,, | Performed by: FAMILY MEDICINE

## 2019-03-16 PROCEDURE — 80061 LIPID PANEL: CPT

## 2019-03-16 PROCEDURE — 99214 OFFICE O/P EST MOD 30 MIN: CPT | Mod: S$GLB,,, | Performed by: FAMILY MEDICINE

## 2019-03-16 PROCEDURE — 99214 PR OFFICE/OUTPT VISIT, EST, LEVL IV, 30-39 MIN: ICD-10-PCS | Mod: S$GLB,,, | Performed by: FAMILY MEDICINE

## 2019-03-16 RX ORDER — PREDNISONE 20 MG/1
TABLET ORAL
Refills: 0 | COMMUNITY
Start: 2019-03-10 | End: 2019-03-16

## 2019-03-16 RX ORDER — BENZONATATE 200 MG/1
CAPSULE ORAL
Refills: 0 | COMMUNITY
Start: 2019-03-10 | End: 2019-10-02

## 2019-03-16 RX ORDER — AMOXICILLIN AND CLAVULANATE POTASSIUM 875; 125 MG/1; MG/1
TABLET, FILM COATED ORAL
Refills: 0 | COMMUNITY
Start: 2019-03-11 | End: 2019-03-16

## 2019-03-16 RX ORDER — AMOXICILLIN AND CLAVULANATE POTASSIUM 875; 125 MG/1; MG/1
1 TABLET, FILM COATED ORAL EVERY 12 HOURS
Qty: 14 TABLET | Refills: 0 | Status: SHIPPED | OUTPATIENT
Start: 2019-03-16 | End: 2019-03-23

## 2019-03-16 RX ORDER — PREDNISONE 5 MG/1
TABLET ORAL
Qty: 30 TABLET | Refills: 0 | Status: SHIPPED | OUTPATIENT
Start: 2019-03-16 | End: 2019-10-02

## 2019-03-16 NOTE — PROGRESS NOTES
CHIEF COMPLAINT  Sinus Problem      HISTORY OF PRESENT ILLNESS    This is my first time treating him here. All problems addressed today are NEW TO ME.  He is requesting that I take over as his primary care provider.     Problem List Items Addressed This Visit        ENT    Acute non-recurrent maxillary sinusitis - Primary    Current Assessment & Plan     Quality described as sinus pressure/discomfort.  Location predominantly in the region of the right maxillary sinus.  Associated symptoms include thick sinus drainage, nonproductive cough.  Onset was a little more than 2 weeks ago.  Severity described as moderate.  Alleviating factors include prednisone and Augmentin prescribed 6 days ago at outside urgent care clinic.  He says he completed 4 days of the prednisone (40 milligrams daily) and 4 days of the Augmentin, then he lost his prescription bottles.  He says he has gotten worse over the last couple of days.         Relevant Medications    amoxicillin-clavulanate 875-125mg (AUGMENTIN) 875-125 mg per tablet    predniSONE (DELTASONE) 5 MG tablet       Cardiac/Vascular    S/P coronary artery stent placement (Chronic)    Current Assessment & Plan     Compensated, stable, asymptomatic.He appears to be tolerating statin without apparent symptoms of myositis or hepatic dysfunction.          Relevant Orders    Lipid panel    Comprehensive metabolic panel    Coronary artery disease involving native coronary artery of native heart without angina pectoris (Chronic)    Current Assessment & Plan     Compensated, stable, asymptomatic.He appears to be tolerating statin without apparent symptoms of myositis or hepatic dysfunction.          Relevant Orders    Lipid panel    Comprehensive metabolic panel    MI, old    Current Assessment & Plan     Compensated, stable, asymptomatic.He appears to be tolerating statin without apparent symptoms of myositis or hepatic dysfunction.          Elevated blood pressure reading without  "diagnosis of hypertension    Current Assessment & Plan     Asymptomatic, mild elevation today.  He has not been on decongestants.  It was agreed to treat with therapeutic lifestyle, and he would return in 4 weeks for preventive services exam, at which time we would reassess his blood pressure.            Orthopedic    Tear of medial meniscus of left knee, current    Current Assessment & Plan     Improved. Tolerable. Does not want further intervention.                 REVIEW OF SYSTEMS  CONSTITUTIONAL: No objective fever reported.  PULMONARY: No hemoptysis or difficulty breathing reported.  CARDIOVASCULAR: No chest pain or orthopnea reported.     PHYSICAL EXAM  Vitals:    03/16/19 0820   BP: (!) 126/90   BP Location: Right arm   Patient Position: Sitting   BP Method: Medium (Automatic)   Pulse: 75   Temp: 97.4 °F (36.3 °C)   TempSrc: Tympanic   SpO2: 97%   Weight: 87.7 kg (193 lb 5.5 oz)   Height: 5' 9.5" (1.765 m)   CONSTITUTIONAL: Vital signs noted. No apparent distress. Does not appear acutely ill or septic. Appears adequately hydrated.  EYES: Pupils equal and reactive. Extraocular movements intact. Sclerae anicteric. Lids and conjunctiva unremarkable.  ENT: External ENT grossly unremarkable. Ear canals and visualized tympanic membranes are unremarkable. Hearing grossly intact. Nasal mucosa pink-blue and boggy. Mild tenderness with percussion of right maxillary sinus. Oropharynx moist with mild posterior cobblestoning but without lesion, inflammation or exudate. Posterior oropharynx is symmetric.  NECK: Neck supple without meningismus. Trachea midline. No significant cervical lymphadenopathy.  PULM: Lungs clear. Breathing unlabored.  HEART: Auscultation reveals regular rate and rhythm.  DERM: Skin warm and moist with normal tu rgor.    ASSESSMENT & PLAN (DIAGNOSES & ORDERS)  1. Acute non-recurrent maxillary sinusitis  amoxicillin-clavulanate 875-125mg (AUGMENTIN) 875-125 mg per tablet    predniSONE (DELTASONE) 5 " "MG tablet   2. MI, old     3. Coronary artery disease involving native coronary artery of native heart without angina pectoris  Lipid panel    Comprehensive metabolic panel   4. S/P coronary artery stent placement  Lipid panel    Comprehensive metabolic panel   5. Tear of medial meniscus of left knee, current, unspecified tear type, initial encounter     6. Elevated blood pressure reading without diagnosis of hypertension         Follow-up in about 4 weeks (around 4/15/2019) for wellness and preventive services.    Documentation entered by me for this encounter may have been done in part using speech-recognition technology. Although I have made an effort to ensure accuracy, "sound like" errors may exist and should be interpreted in context. -NETTE Clark MD      There are no Patient Instructions on file for this visit.   "

## 2019-03-16 NOTE — Clinical Note
He is supposed to return in mid-April for preventive services exam, at which time we will reassess his blood pressure.  If he does not return, please contact him to reschedule.  Thanks for your help.

## 2019-03-16 NOTE — ASSESSMENT & PLAN NOTE
Compensated, stable, asymptomatic.He appears to be tolerating statin without apparent symptoms of myositis or hepatic dysfunction.

## 2019-03-16 NOTE — ASSESSMENT & PLAN NOTE
Quality described as sinus pressure/discomfort.  Location predominantly in the region of the right maxillary sinus.  Associated symptoms include thick sinus drainage, nonproductive cough.  Onset was a little more than 2 weeks ago.  Severity described as moderate.  Alleviating factors include prednisone and Augmentin prescribed 6 days ago at outside urgent care clinic.  He says he completed 4 days of the prednisone (40 milligrams daily) and 4 days of the Augmentin, then he lost his prescription bottles.  He says he has gotten worse over the last couple of days.

## 2019-03-16 NOTE — ASSESSMENT & PLAN NOTE
Asymptomatic, mild elevation today.  He has not been on decongestants.  It was agreed to treat with therapeutic lifestyle, and he would return in 4 weeks for preventive services exam, at which time we would reassess his blood pressure.

## 2019-03-18 ENCOUNTER — PATIENT MESSAGE (OUTPATIENT)
Dept: INTERNAL MEDICINE | Facility: CLINIC | Age: 54
End: 2019-03-18

## 2019-03-18 NOTE — PROGRESS NOTES
"Hi, Delon.      I'm happy to report that these test results are NORMAL or at least ACCEPTABLE.    If you have any questions about your test results, write them down and bring them with you to your next appointment. You can call or message me in the meantime if you have urgent questions.    Thank you for letting me care for you. I look forward to seeing you at your next appointment.    Sincerely,    NETTE Clark MD    P.S. - Want to learn more about your test results and what they mean? It's as simple as 1, 2, 3.     (1) Log in to your [facility] account at [email or web address]     (2) From the "View test results" tab, click on the test you want to know more about.     (3) Click on the "About This Test" link."

## 2019-03-25 ENCOUNTER — TELEPHONE (OUTPATIENT)
Dept: INTERNAL MEDICINE | Facility: CLINIC | Age: 54
End: 2019-03-25

## 2019-03-25 DIAGNOSIS — A60.00 GENITAL HERPES SIMPLEX, UNSPECIFIED SITE: Primary | ICD-10-CM

## 2019-03-26 RX ORDER — VALACYCLOVIR HYDROCHLORIDE 1 G/1
TABLET, FILM COATED ORAL
Qty: 20 TABLET | Refills: 1 | Status: SHIPPED | OUTPATIENT
Start: 2019-03-26 | End: 2019-10-02 | Stop reason: DRUGHIGH

## 2019-03-26 NOTE — TELEPHONE ENCOUNTER
----- Message from Carolann Valencia sent at 3/25/2019  2:58 PM CDT -----  Contact: Patient  Type:  RX Refill Request    Who Called:  Delon  Refill or New Rx: refill  RX Name and Strength: needs a refill on medication used for Herpes Simplex (he doesn't know the name of it).  How is the patient currently taking it? (ex. 1XDay): 1xdaily  Is this a 30 day or 90 day RX: 30 day  Preferred Pharmacy with phone number:   [id number] - [place], [place] - [address] AT [facility]  [address]  [facility] [address]  Phone: [phone] Fax: [phone]    Local or Mail Order: local  Ordering Provider: he is now with Dr. Clark and would like him to refill it  Would the patient rather a call back or a response via [facility]?  Call back   Best Call Back Number: [phone]  Additional Information: n/a    Fior,  Carolann

## 2019-03-26 NOTE — TELEPHONE ENCOUNTER
TASK: Please read Patient Portal Message (below), and then contact him to verify his receipt and understanding. Thanks.  --------------------------------------------------------------------------------  Delon Medina.    I received a message that you wanted a prescription for an antiviral medicine to treat HSV.  I do not recall treating you for this previously, but I saw in July 2018 your given a prescription for genital herpes.    The best medicine is Valacyclovir, mainly because it is easier to take (one tablet daily for five days).    It is important to start taking the medicine as soon as you first notice an outbreak developing.  The sooner you start, the more effect of the medication is.  If you do not start the medicine until after 24-48 hours after the outbreak starts, it will not be that effective.    I sent you a prescription for a quantity of 20 tablets to your preferred pharmacy ([email or web address] on [address]).  This is enough for four rounds of treatment. If you have more than 4 outbreaks a year, you might want to consider daily suppression therapy. We can discuss this at your next appointment.    BTW, if the prescription is more than $30, you may want to check www.[employer] to price shop. (When I checked, it appeared that the medicine was more expensive at [facility].)    Thanks for letting me care for you.    Sincerely,    NETTE Clark MD    Medications Ordered This Encounter   Medications    valACYclovir (VALTREX) 1000 MG tablet     Sig: Take 1 tablet (1000 mg) by mouth once daily for 5 days. Start at onset of HSV outbreak.     Dispense:  20 tablet     Refill:  1

## 2019-03-26 NOTE — TELEPHONE ENCOUNTER
Spoke with patient and informed him of prescription that Dr. Clark sent for his Herpes outbreaks. He verbalized understanding.

## 2019-04-17 RX ORDER — ATORVASTATIN CALCIUM 40 MG/1
TABLET, FILM COATED ORAL
Qty: 45 TABLET | Refills: 3 | Status: SHIPPED | OUTPATIENT
Start: 2019-04-17 | End: 2019-10-02 | Stop reason: SDUPTHER

## 2019-06-17 PROBLEM — J01.00 ACUTE NON-RECURRENT MAXILLARY SINUSITIS: Status: RESOLVED | Noted: 2019-03-16 | Resolved: 2019-06-17

## 2019-09-24 ENCOUNTER — PATIENT OUTREACH (OUTPATIENT)
Dept: ADMINISTRATIVE | Facility: HOSPITAL | Age: 54
End: 2019-09-24

## 2019-09-24 DIAGNOSIS — Z12.11 SCREEN FOR COLON CANCER: Primary | ICD-10-CM

## 2019-09-24 NOTE — PROGRESS NOTES
Contacted patient to follow up on overdue fit kit. Informed patient fit kit will be mailed to his home.   Scheduled patient for htm appointment on 10/02/19    FitKit was mailed to patient on 9/24/2019 9:04 AM

## 2019-09-25 ENCOUNTER — PATIENT OUTREACH (OUTPATIENT)
Dept: ADMINISTRATIVE | Facility: HOSPITAL | Age: 54
End: 2019-09-25

## 2019-10-02 ENCOUNTER — PATIENT MESSAGE (OUTPATIENT)
Dept: ADMINISTRATIVE | Facility: OTHER | Age: 54
End: 2019-10-02

## 2019-10-02 ENCOUNTER — OFFICE VISIT (OUTPATIENT)
Dept: INTERNAL MEDICINE | Facility: CLINIC | Age: 54
End: 2019-10-02
Payer: COMMERCIAL

## 2019-10-02 VITALS
WEIGHT: 192.88 LBS | HEIGHT: 70 IN | BODY MASS INDEX: 27.61 KG/M2 | SYSTOLIC BLOOD PRESSURE: 142 MMHG | TEMPERATURE: 98 F | DIASTOLIC BLOOD PRESSURE: 98 MMHG | OXYGEN SATURATION: 98 % | HEART RATE: 84 BPM | RESPIRATION RATE: 16 BRPM

## 2019-10-02 DIAGNOSIS — I25.2 MI, OLD: ICD-10-CM

## 2019-10-02 DIAGNOSIS — Z95.5 S/P CORONARY ARTERY STENT PLACEMENT: Chronic | ICD-10-CM

## 2019-10-02 DIAGNOSIS — I25.10 CORONARY ARTERY DISEASE INVOLVING NATIVE CORONARY ARTERY OF NATIVE HEART WITHOUT ANGINA PECTORIS: Chronic | ICD-10-CM

## 2019-10-02 DIAGNOSIS — I10 ESSENTIAL HYPERTENSION: Primary | Chronic | ICD-10-CM

## 2019-10-02 DIAGNOSIS — I25.10 CORONARY ARTERY DISEASE INVOLVING NATIVE CORONARY ARTERY OF NATIVE HEART WITHOUT ANGINA PECTORIS: Primary | Chronic | ICD-10-CM

## 2019-10-02 DIAGNOSIS — A60.00 HERPES SIMPLEX INFECTION OF GENITOURINARY SYSTEM: Chronic | ICD-10-CM

## 2019-10-02 PROCEDURE — 3077F SYST BP >= 140 MM HG: CPT | Mod: CPTII,S$GLB,, | Performed by: FAMILY MEDICINE

## 2019-10-02 PROCEDURE — 99999 PR PBB SHADOW E&M-EST. PATIENT-LVL IV: CPT | Mod: PBBFAC,,, | Performed by: FAMILY MEDICINE

## 2019-10-02 PROCEDURE — 3077F PR MOST RECENT SYSTOLIC BLOOD PRESSURE >= 140 MM HG: ICD-10-PCS | Mod: CPTII,S$GLB,, | Performed by: FAMILY MEDICINE

## 2019-10-02 PROCEDURE — 3008F PR BODY MASS INDEX (BMI) DOCUMENTED: ICD-10-PCS | Mod: CPTII,S$GLB,, | Performed by: FAMILY MEDICINE

## 2019-10-02 PROCEDURE — 99213 PR OFFICE/OUTPT VISIT, EST, LEVL III, 20-29 MIN: ICD-10-PCS | Mod: S$GLB,,, | Performed by: FAMILY MEDICINE

## 2019-10-02 PROCEDURE — 99999 PR PBB SHADOW E&M-EST. PATIENT-LVL IV: ICD-10-PCS | Mod: PBBFAC,,, | Performed by: FAMILY MEDICINE

## 2019-10-02 PROCEDURE — 3080F PR MOST RECENT DIASTOLIC BLOOD PRESSURE >= 90 MM HG: ICD-10-PCS | Mod: CPTII,S$GLB,, | Performed by: FAMILY MEDICINE

## 2019-10-02 PROCEDURE — 3008F BODY MASS INDEX DOCD: CPT | Mod: CPTII,S$GLB,, | Performed by: FAMILY MEDICINE

## 2019-10-02 PROCEDURE — 3080F DIAST BP >= 90 MM HG: CPT | Mod: CPTII,S$GLB,, | Performed by: FAMILY MEDICINE

## 2019-10-02 PROCEDURE — 99213 OFFICE O/P EST LOW 20 MIN: CPT | Mod: S$GLB,,, | Performed by: FAMILY MEDICINE

## 2019-10-02 RX ORDER — VALACYCLOVIR HYDROCHLORIDE 500 MG/1
500 TABLET, FILM COATED ORAL DAILY
Qty: 90 TABLET | Refills: 4 | Status: SHIPPED | OUTPATIENT
Start: 2019-10-02 | End: 2022-09-01 | Stop reason: SDUPTHER

## 2019-10-02 RX ORDER — METOPROLOL SUCCINATE 50 MG/1
50 TABLET, EXTENDED RELEASE ORAL DAILY
Qty: 90 TABLET | Refills: 4 | Status: SHIPPED | OUTPATIENT
Start: 2019-10-02 | End: 2021-06-04 | Stop reason: DRUGHIGH

## 2019-10-02 RX ORDER — ATORVASTATIN CALCIUM 40 MG/1
40 TABLET, FILM COATED ORAL NIGHTLY
Qty: 90 TABLET | Refills: 4 | Status: SHIPPED | OUTPATIENT
Start: 2019-10-02 | End: 2020-10-14 | Stop reason: SDUPTHER

## 2019-10-03 ENCOUNTER — PATIENT OUTREACH (OUTPATIENT)
Dept: OTHER | Facility: OTHER | Age: 54
End: 2019-10-03

## 2019-10-03 NOTE — LETTER
October 3, 2019     Delon Elder  [address] [address] [address]       Dear Delon,    Welcome to [facility]! Our goal is to make care effective, proactive and convenient by using data you send us from home to better treat your chronic conditions.          My name is Hien Espinal, and I am your dedicated Digital Medicine clinician. As an expert in medication management, I will help ensure that the medications you are taking continue to provide the intended benefits and help you reach your goals. You can reach me directly at [phone] or by sending me a message directly through your [facility] account.      I am Quan Puente and I will be your health . My job is to help you identify lifestyle changes to improve your disease control. We will talk about nutrition, exercise, and other ways you may be able to adjust your current habits to better your health. Additionally, we will help ensure you are completing the tests and screenings that are necessary to help manage your conditions. You can reach me directly at [phone] or by sending me a message directly through your [facility] account.    Most importantly, YOU are at the center of this team. Together, we will work to improve your overall health and encourage you to meet your goals for a healthier lifestyle.     What we expect from YOU:  · Please take frequent home blood pressure measurements. We ask that you take at least 1 blood pressure reading per week, but more information will better help us get you know you. Be sure you rest for a few minutes before taking the reading in a quiet, comfortable place.     Be available to receive phone calls or [facility] messages, when appropriate, from your care team. Please let us know if there are any specific days or times that work best for us to reach you via phone.     Complete routine tests and screenings. Dont worry, we will help keep you on track!           What you should  expect from your Digital Medicine Care Team:   We will work with you to create a personalized plan of care and provide you with encouragement and education, including regarding lifestyle changes, that could help you manage your disease states.     We will adjust your current medications, if needed, and continue to monitor your long-term progress.     We will provide you and your physician with monthly progress reports after you have been in the program for more than 30 days.     We will send you reminders through [facility] and text messages to help ensure you do not miss any testing deadlines to help manage your disease states.    You will be able to reach us by phone or through your [facility] account by clicking our names under Care Team on the right side of the home screen.    I look forward to working with you to achieve your blood pressure goals!    We look forward to working with you to help manage your health,    Sincerely,    Your Digital Medicine Team    Please visit our websites to learn more:   · Hypertension: [email or web address]      Remember, we are not available for emergencies. If you have an emergency, please contact your doctors office directly or call [facility] on-call ([phone] or [phone]) or [phone].

## 2019-10-03 NOTE — LETTER
October 3, 2019     Delon Elder  [address] [address] [address]       Dear Delon,    Welcome to [facility]! Our goal is to make care effective, proactive and convenient by using data you send us from home to better treat your chronic conditions.          My name is Hien Espinal, and I am your dedicated Digital Medicine clinician. As an expert in medication management, I will help ensure that the medications you are taking continue to provide the intended benefits and help you reach your goals. You can reach me directly at [phone] or by sending me a message directly through your [facility] account.      I am Quan Puente and I will be your health . My job is to help you identify lifestyle changes to improve your disease control. We will talk about nutrition, exercise, and other ways you may be able to adjust your current habits to better your health. Additionally, we will help ensure you are completing the tests and screenings that are necessary to help manage your conditions. You can reach me directly at [phone] or by sending me a message directly through your [facility] account.    Most importantly, YOU are at the center of this team. Together, we will work to improve your overall health and encourage you to meet your goals for a healthier lifestyle.     What we expect from YOU:  · Please take frequent home blood pressure measurements. We ask that you take at least 1 blood pressure reading per week, but more information will better help us get you know you. Be sure you rest for a few minutes before taking the reading in a quiet, comfortable place.     Be available to receive phone calls or [facility] messages, when appropriate, from your care team. Please let us know if there are any specific days or times that work best for us to reach you via phone.     Complete routine tests and screenings. Dont worry, we will help keep you on track!           What you should  expect from your Digital Medicine Care Team:   We will work with you to create a personalized plan of care and provide you with encouragement and education, including regarding lifestyle changes, that could help you manage your disease states.     We will adjust your current medications, if needed, and continue to monitor your long-term progress.     We will provide you and your physician with monthly progress reports after you have been in the program for more than 30 days.     We will send you reminders through [facility] and text messages to help ensure you do not miss any testing deadlines to help manage your disease states.    You will be able to reach us by phone or through your [facility] account by clicking our names under Care Team on the right side of the home screen.    I look forward to working with you to achieve your blood pressure goals!    We look forward to working with you to help manage your health,    Sincerely,    Your Digital Medicine Team    Please visit our websites to learn more:   · Hypertension: [email or web address]      Remember, we are not available for emergencies. If you have an emergency, please contact your doctors office directly or call [facility] on-call ([phone] or [phone]) or [phone].

## 2019-10-03 NOTE — PROGRESS NOTES
Digital Medicine Program Enrollment      Our goal is to get BP to consistently below 130/80mmHg and make the process convenient so patient can avoid extra trips to the office. Getting your blood pressure below 130/80mmHg (definition of control) will reduce your risk for heart attack, kidney failure, stroke and death (as well as kidney failure, eye disease, & dementia)      Reviewed that the Digital Medicine care team - consisting of a clinician and a health  - will follow the most current evidence-based national guidelines for treating your condition.  The health  will focus on lifestyle modifications and motivation while the clinician will focus on medication therapy.  The care team will review all data on a regular basis and reach out as needed.      Explained that one of the key parts of the program is communication with the care team.  Asked patient to respond to outreach attempts and complete questionnaires.  Stressed importance of medication adherence.      Explained that we expect patient to obtain several blood pressures per week at random times of day.  Instructed patient not to allow anyone else to use phone and monitoring device.  Confirmed appropriate BP monitoring technique.      Explained to patient that the digital medicine team is not available for emergencies.  Patient will call [facility] on-call ([phone] or [phone]) or [phone] if needed.

## 2019-10-04 ENCOUNTER — HOSPITAL ENCOUNTER (OUTPATIENT)
Dept: RADIOLOGY | Facility: HOSPITAL | Age: 54
Discharge: HOME | End: 2019-10-04
Attending: INTERNAL MEDICINE
Payer: COMMERCIAL

## 2019-10-04 ENCOUNTER — TELEPHONE (OUTPATIENT)
Dept: CARDIOLOGY | Facility: CLINIC | Age: 54
End: 2019-10-04

## 2019-10-04 ENCOUNTER — OFFICE VISIT (OUTPATIENT)
Dept: CARDIOLOGY | Facility: CLINIC | Age: 54
End: 2019-10-04
Payer: COMMERCIAL

## 2019-10-04 ENCOUNTER — CLINICAL SUPPORT (OUTPATIENT)
Dept: CARDIOLOGY | Facility: CLINIC | Age: 54
End: 2019-10-04
Payer: COMMERCIAL

## 2019-10-04 VITALS
DIASTOLIC BLOOD PRESSURE: 80 MMHG | SYSTOLIC BLOOD PRESSURE: 118 MMHG | BODY MASS INDEX: 27.3 KG/M2 | HEART RATE: 62 BPM | HEIGHT: 70 IN | WEIGHT: 190.69 LBS

## 2019-10-04 DIAGNOSIS — I25.10 CORONARY ARTERY DISEASE INVOLVING NATIVE CORONARY ARTERY OF NATIVE HEART WITHOUT ANGINA PECTORIS: ICD-10-CM

## 2019-10-04 DIAGNOSIS — I25.2 MI, OLD: ICD-10-CM

## 2019-10-04 DIAGNOSIS — Z95.5 S/P CORONARY ARTERY STENT PLACEMENT: Chronic | ICD-10-CM

## 2019-10-04 DIAGNOSIS — I10 ESSENTIAL HYPERTENSION: Chronic | ICD-10-CM

## 2019-10-04 DIAGNOSIS — I25.10 CORONARY ARTERY DISEASE INVOLVING NATIVE CORONARY ARTERY OF NATIVE HEART WITHOUT ANGINA PECTORIS: Chronic | ICD-10-CM

## 2019-10-04 DIAGNOSIS — I25.10 CORONARY ARTERY DISEASE INVOLVING NATIVE CORONARY ARTERY OF NATIVE HEART WITHOUT ANGINA PECTORIS: Primary | Chronic | ICD-10-CM

## 2019-10-04 PROCEDURE — 93010 ELECTROCARDIOGRAM REPORT: CPT | Mod: S$GLB,,, | Performed by: INTERNAL MEDICINE

## 2019-10-04 PROCEDURE — 71046 X-RAY EXAM CHEST 2 VIEWS: CPT | Mod: 26,,, | Performed by: RADIOLOGY

## 2019-10-04 PROCEDURE — 99214 OFFICE O/P EST MOD 30 MIN: CPT | Mod: 25,S$GLB,, | Performed by: INTERNAL MEDICINE

## 2019-10-04 PROCEDURE — 93005 EKG 12-LEAD: ICD-10-PCS | Mod: S$GLB,,, | Performed by: INTERNAL MEDICINE

## 2019-10-04 PROCEDURE — 93005 ELECTROCARDIOGRAM TRACING: CPT | Mod: S$GLB,,, | Performed by: INTERNAL MEDICINE

## 2019-10-04 PROCEDURE — 71046 X-RAY EXAM CHEST 2 VIEWS: CPT | Mod: TC

## 2019-10-04 PROCEDURE — 94640 PR INHAL RX, AIRWAY OBST/DX SPUTUM INDUCT: ICD-10-PCS | Mod: S$GLB,,, | Performed by: INTERNAL MEDICINE

## 2019-10-04 PROCEDURE — 99999 PR PBB SHADOW E&M-EST. PATIENT-LVL III: CPT | Mod: PBBFAC,,, | Performed by: INTERNAL MEDICINE

## 2019-10-04 PROCEDURE — 99999 PR PBB SHADOW E&M-EST. PATIENT-LVL III: ICD-10-PCS | Mod: PBBFAC,,, | Performed by: INTERNAL MEDICINE

## 2019-10-04 PROCEDURE — 3008F BODY MASS INDEX DOCD: CPT | Mod: CPTII,S$GLB,, | Performed by: INTERNAL MEDICINE

## 2019-10-04 PROCEDURE — 71046 XR CHEST PA AND LATERAL: ICD-10-PCS | Mod: 26,,, | Performed by: RADIOLOGY

## 2019-10-04 PROCEDURE — 93010 EKG 12-LEAD: ICD-10-PCS | Mod: S$GLB,,, | Performed by: INTERNAL MEDICINE

## 2019-10-04 PROCEDURE — 3008F PR BODY MASS INDEX (BMI) DOCUMENTED: ICD-10-PCS | Mod: CPTII,S$GLB,, | Performed by: INTERNAL MEDICINE

## 2019-10-04 PROCEDURE — 94640 AIRWAY INHALATION TREATMENT: CPT | Mod: S$GLB,,, | Performed by: INTERNAL MEDICINE

## 2019-10-04 PROCEDURE — 99214 PR OFFICE/OUTPT VISIT, EST, LEVL IV, 30-39 MIN: ICD-10-PCS | Mod: 25,S$GLB,, | Performed by: INTERNAL MEDICINE

## 2019-10-04 RX ORDER — ALBUTEROL SULFATE 2.5 MG/.5ML
2.5 SOLUTION RESPIRATORY (INHALATION)
Status: COMPLETED | OUTPATIENT
Start: 2019-10-04 | End: 2019-10-04

## 2019-10-04 RX ORDER — ALBUTEROL SULFATE 5 MG/ML
2.5 SOLUTION RESPIRATORY (INHALATION) EVERY 6 HOURS PRN
COMMUNITY
End: 2023-02-07 | Stop reason: ALTCHOICE

## 2019-10-04 RX ORDER — IPRATROPIUM BROMIDE 0.5 MG/2.5ML
0.5 SOLUTION RESPIRATORY (INHALATION)
Status: CANCELLED | OUTPATIENT
Start: 2019-10-04 | End: 2019-10-04

## 2019-10-04 RX ORDER — ALBUTEROL SULFATE 2.5 MG/.5ML
2.5 SOLUTION RESPIRATORY (INHALATION)
Status: CANCELLED | OUTPATIENT
Start: 2019-10-04 | End: 2019-10-04

## 2019-10-04 RX ADMIN — ALBUTEROL SULFATE 2.5 MG: 2.5 SOLUTION RESPIRATORY (INHALATION) at 02:10

## 2019-10-04 NOTE — LETTER
October 4, 2019      NETTE Clark MD  [address] [place] [address]           [facility]  [address] [place] [address]  Phone: [phone]  Fax: [phone]          Patient: Delon Elder   MR Number: [id number]   YOB: 1965   Date of Visit: 10/4/2019       Dear Dr. NETTE Clark:    Thank you for referring Delon Elder to me for evaluation. Attached you will find relevant portions of my assessment and plan of care.    If you have questions, please do not hesitate to call me. I look forward to following Delon Elder along with you.    Sincerely,    Nayely Catalan MD    Enclosure  CC:  No Recipients    If you would like to receive this communication electronically, please contact [email or web address] or [phone] to request more information on [employer] [facility] access.    For providers and/or their staff who would like to refer a patient to [facility], please contact us through our one-stop-shop provider referral line, [facility], at [phone].    If you feel you have received this communication in error or would no longer like to receive these types of communications, please e-mail [email or web address]

## 2019-10-04 NOTE — PROGRESS NOTES
Subjective:   Patient ID:  Delon Elder is a 54 y.o. male who presents for follow up of Consult (Establishing care); Coronary Artery Disease; Shortness of Breath; and Chest Pain      HPI  A 55 yo male with cad s/p mi rca stent moderate lad disease in 2016 is here for f/u he has been working on the [employer] machine he was doing well clinically. He felt this morning some chest tightness that is different than hi mi pain which was burning pain. He has not had any other ssues clinically he feels like he has some tightness in chest . He does not feel he cannot take a deep breath it is difficult to take a deep braeth he is wheezing and coughing. He had the flu shot and shingles no exertional symptoms. Has  A cold recently he feels  Ear fullness he feels wheezing when coughs.recently was on amoxicillin and flonase.   Past Medical History:   Diagnosis Date    Acute coronary syndrome     CAD (coronary artery disease) 2015    Hyperlipidemia        Past Surgical History:   Procedure Laterality Date    APPENDECTOMY      CARDIAC CATHETERIZATION      CORONARY ANGIOPLASTY         Social History     Tobacco Use    Smoking status: Former Smoker     Years: 0.00     Last attempt to quit: 2010     Years since quittin.4    Smokeless tobacco: Never Used   Substance Use Topics    Alcohol use: No    Drug use: No       Family History   Problem Relation Age of Onset    Hyperlipidemia Mother     Hyperlipidemia Father     Melanoma Neg Hx     Psoriasis Neg Hx     Lupus Neg Hx        Current Outpatient Medications   Medication Sig    albuterol (PROVENTIL) 5 mg/mL nebulizer solution Take 2.5 mg by nebulization every 6 (six) hours as needed for Wheezing. Rescue    aspirin 81 MG Chew Take 81 mg by mouth.    atorvastatin (LIPITOR) 40 MG tablet Take 1 tablet (40 mg total) by mouth every evening.    DIPHENHYDRAMINE HCL (BENADRYL ORAL) Take by mouth.    metoprolol succinate (TOPROL-XL) 50 MG 24 hr tablet Take 1 tablet  (50 mg total) by mouth once daily.    valACYclovir (VALTREX) 500 MG tablet Take 1 tablet (500 mg total) by mouth once daily.     No current facility-administered medications for this visit.      Current Outpatient Medications on File Prior to Visit   Medication Sig    albuterol (PROVENTIL) 5 mg/mL nebulizer solution Take 2.5 mg by nebulization every 6 (six) hours as needed for Wheezing. Rescue    aspirin 81 MG Chew Take 81 mg by mouth.    atorvastatin (LIPITOR) 40 MG tablet Take 1 tablet (40 mg total) by mouth every evening.    DIPHENHYDRAMINE HCL (BENADRYL ORAL) Take by mouth.    metoprolol succinate (TOPROL-XL) 50 MG 24 hr tablet Take 1 tablet (50 mg total) by mouth once daily.    valACYclovir (VALTREX) 500 MG tablet Take 1 tablet (500 mg total) by mouth once daily.     No current facility-administered medications on file prior to visit.      Review of patient's allergies indicates:   Allergen Reactions    Sulfa (sulfonamide antibiotics) Shortness Of Breath     Review of Systems   Constitution: Negative for malaise/fatigue.   Eyes: Negative for blurred vision.   Cardiovascular: Positive for chest pain. Negative for claudication, cyanosis, dyspnea on exertion, irregular heartbeat, leg swelling, near-syncope, orthopnea, palpitations and paroxysmal nocturnal dyspnea.   Respiratory: Positive for cough and wheezing. Negative for hemoptysis and shortness of breath.    Hematologic/Lymphatic: Negative for bleeding problem. Does not bruise/bleed easily.   Skin: Negative for dry skin and itching.   Musculoskeletal: Negative for falls, muscle weakness and myalgias.   Gastrointestinal: Negative for abdominal pain, diarrhea, heartburn, hematemesis, hematochezia and melena.   Genitourinary: Negative for flank pain and hematuria.   Neurological: Negative for dizziness, focal weakness, headaches, light-headedness, numbness, paresthesias, seizures and weakness.   Psychiatric/Behavioral: Negative for altered mental status  "and memory loss. The patient is not nervous/anxious.    Allergic/Immunologic: Negative for hives.       Objective:   Physical Exam   Constitutional: He is oriented to person, place, and time. He appears well-developed and well-nourished. No distress.   HENT:   Head: Normocephalic and atraumatic.   Eyes: Pupils are equal, round, and reactive to light. EOM are normal. Right eye exhibits no discharge. Left eye exhibits no discharge.   Neck: Neck supple. No JVD present. No thyromegaly present.   Cardiovascular: Normal rate, regular rhythm, normal heart sounds and intact distal pulses. Exam reveals no gallop and no friction rub.   No murmur heard.  Pulses:       Carotid pulses are 2+ on the right side, and 2+ on the left side.       Radial pulses are 2+ on the right side, and 2+ on the left side.        Femoral pulses are 2+ on the right side, and 2+ on the left side.       Popliteal pulses are 2+ on the right side, and 2+ on the left side.        Dorsalis pedis pulses are 2+ on the right side, and 2+ on the left side.        Posterior tibial pulses are 2+ on the right side, and 2+ on the left side.   Pulmonary/Chest: Effort normal. No respiratory distress. He has wheezes (post cough ). He has no rales. He exhibits no tenderness.   Abdominal: Soft. Bowel sounds are normal. He exhibits no distension. There is no tenderness.   Musculoskeletal: Normal range of motion. He exhibits no edema.   Neurological: He is alert and oriented to person, place, and time. No cranial nerve deficit.   Skin: Skin is warm and dry. No rash noted. He is not diaphoretic. No erythema.   Psychiatric: He has a normal mood and affect. His behavior is normal.   Nursing note and vitals reviewed.    Vitals:    10/04/19 1352 10/04/19 1358   BP: 116/76 118/80   BP Location: Right arm Left arm   Patient Position: Sitting Sitting   BP Method: Medium (Manual) Medium (Manual)   Pulse: 62    Weight: 86.5 kg (190 lb 11.2 oz)    Height: 5' 9.5" (1.765 m)  "     Lab Results   Component Value Date    CHOL 154 03/16/2019    CHOL 176 04/12/2018    CHOL 157 01/19/2017     Lab Results   Component Value Date    HDL 36 (L) 03/16/2019    HDL 41 04/12/2018    HDL 41 01/19/2017     Lab Results   Component Value Date    LDLCALC 84.6 03/16/2019    LDLCALC 118.8 04/12/2018    LDLCALC 102.2 01/19/2017     Lab Results   Component Value Date    TRIG 167 (H) 03/16/2019    TRIG 81 04/12/2018    TRIG 69 01/19/2017     Lab Results   Component Value Date    CHOLHDL 23.4 03/16/2019    CHOLHDL 23.3 04/12/2018    CHOLHDL 26.1 01/19/2017       Chemistry        Component Value Date/Time     03/16/2019 0915    K 4.3 03/16/2019 0915     03/16/2019 0915    CO2 26 03/16/2019 0915    BUN 13 03/16/2019 0915    CREATININE 0.9 03/16/2019 0915    GLU 84 03/16/2019 0915        Component Value Date/Time    CALCIUM 9.0 03/16/2019 0915    ALKPHOS 78 03/16/2019 0915    AST 20 03/16/2019 0915    ALT 30 03/16/2019 0915    BILITOT 0.3 03/16/2019 0915    ESTGFRAFRICA >60.0 03/16/2019 0915    EGFRNONAA >60.0 03/16/2019 0915          No results found for: TSH  Lab Results   Component Value Date    INR 0.9 03/22/2015     Lab Results   Component Value Date    WBC 5.74 04/12/2018    HGB 14.8 04/12/2018    HCT 43.2 04/12/2018    MCV 90 04/12/2018     04/12/2018     BMP  Sodium   Date Value Ref Range Status   03/16/2019 138 136 - 145 mmol/L Final     Potassium   Date Value Ref Range Status   03/16/2019 4.3 3.5 - 5.1 mmol/L Final     Chloride   Date Value Ref Range Status   03/16/2019 106 95 - 110 mmol/L Final     CO2   Date Value Ref Range Status   03/16/2019 26 23 - 29 mmol/L Final     BUN, Bld   Date Value Ref Range Status   03/16/2019 13 6 - 20 mg/dL Final     Creatinine   Date Value Ref Range Status   03/16/2019 0.9 0.5 - 1.4 mg/dL Final     Calcium   Date Value Ref Range Status   03/16/2019 9.0 8.7 - 10.5 mg/dL Final     Anion Gap   Date Value Ref Range Status   03/16/2019 6 (L) 8 - 16 mmol/L  Final     eGFR if    Date Value Ref Range Status   03/16/2019 >60.0 >60 mL/min/1.73 m^2 Final     eGFR if non    Date Value Ref Range Status   03/16/2019 >60.0 >60 mL/min/1.73 m^2 Final     Comment:     Calculation used to obtain the estimated glomerular filtration  rate (eGFR) is the CKD-EPI equation.        CrCl cannot be calculated (Patient's most recent lab result is older than the maximum 7 days allowed.).    Assessment:     1. Coronary artery disease involving native coronary artery of native heart without angina pectoris    2. S/P coronary artery stent placement    3. Essential hypertension    4. MI, old      I think his symptoms are hyperactive airways,. His ekg is normal pain is different then mi. Will evaluate cardiac status non invasivelyy will ask pulmonary for neb treatment vs eval.  Plan:   Continue current therapy  Cardiac low salt diet.  Risk factor modification and excercise program.  F/u in 6 months with lipid cmp   Echo stress cardiolite  Phone review.

## 2019-10-04 NOTE — TELEPHONE ENCOUNTER
Telephoned patient and left voice mail message for return call.    ----- Message from Nayely Catalan MD sent at 10/4/2019  4:53 PM CDT -----  Yes needs chronic care  ----- Message -----  From: Nayely Catalan MD  Sent: 10/4/2019   4:08 PM CDT  To: Hossein GIBSON Staff    cxr ok

## 2019-10-07 ENCOUNTER — PATIENT OUTREACH (OUTPATIENT)
Dept: OTHER | Facility: OTHER | Age: 54
End: 2019-10-07

## 2019-10-07 NOTE — PROGRESS NOTES
Digital Medicine: Health  Introduction    Introduced Delon Elder to Digital Medicine. Discussed health  role and recommended lifestyle modifications.        HYPERTENSION  Our goal is to get BP to consistently below 130/80mmHg and make the process convenient so patient can avoid extra trips to the office. Getting your blood pressure below 130/80mmHg (definition of control) will reduce your risk for heart attack, kidney failure, stroke and death (as well as kidney failure, eye disease, & dementia)      Reviewed non-pharmacologic therapies and impact on BP.      Explained that we expect patient to obtain several blood pressures per week at random times of day.  Instructed patient not to allow anyone else to use phone and monitoring device.  Confirmed appropriate BP monitoring technique.      Explained to patient that the digital medicine team is not available for emergencies.  Patient will call [facility] on-call ([phone] or [phone]) or [phone] if needed.      Patient's BP goal is 130/80.Patient's BP average is 126/87 mmHg, which is above goal, per 2017 ACC/AHA Hypertension Guidelines.          Last 5 Patient Entered Readings                                      Current 30 Day Average: 126/87     Recent Readings 10/6/2019 10/5/2019 10/5/2019 10/5/2019 10/5/2019    SBP (mmHg) 126 114 117 116 108    DBP (mmHg) 88 72 85 80 90    Pulse 60 64 68 70 68                Diet:   Patient reports eating or drinking the following: fruit and deli meat    The patient states that he typically eats a non-home cooked meal (ex: dining out, take out, frozen meals) 1-2 times per week.      Intervention(s): reducing sodium intake    Physical Activity:   When asked if exercising, patient responded: yes2 day(s) a week.  His level of intensity when exercising is moderate.    Patient participates in the following activities: walking      SDOH    Intervention/Plan    There are no preventive care reminders to display for this  patient.    Reviewed the importance of self-monitoring, medication adherence, and that the health  can be used as a resource for lifestyle modifications to help reduce or maintain a healthy lifestyle.    Sent link to [email or web address] webpages and my contact information via [employer] for future questions. Follow up scheduled.

## 2019-10-08 ENCOUNTER — TELEPHONE (OUTPATIENT)
Dept: CARDIOLOGY | Facility: CLINIC | Age: 54
End: 2019-10-08

## 2019-10-08 NOTE — TELEPHONE ENCOUNTER
----- Message from Coral Mcmullen sent at 10/8/2019  2:34 PM CDT -----  Patient needs call back to r/s appointment..[phone]

## 2019-10-08 NOTE — TELEPHONE ENCOUNTER
Pt called because he would not be able to afford nuc and wanted to reschedule appointment for 1/9/20. Pt successfully rescheduled

## 2019-10-21 ENCOUNTER — PATIENT OUTREACH (OUTPATIENT)
Dept: OTHER | Facility: OTHER | Age: 54
End: 2019-10-21

## 2019-10-21 NOTE — PROGRESS NOTES
Digital Medicine: Health  Follow-Up    Beta Blocker was making the patient wheeze.    Patient reported that he has been doing good this morning.            Follow Up  Follow-up reason(s): routine education and goal follow-up      Routine Education Topics: eating patterns and physical activity          Diet:       Patient reports that his diet is going well. He recently ate a whole pack of cinnamon rolls but since then he has been doing good with his readings.    Physical Activity:   When asked if exercising, patient responded: yes    Patient reports that he is staying physically active. Called patient while he was running so will follow up with patient in two weeks.    Medication Adherence:       Patient is staying compliant with medication regimen.      [facility]    Intervention/Plan    There are no preventive care reminders to display for this patient.    Last 5 Patient Entered Readings                                      Current 30 Day Average: 124/86     Recent Readings 10/21/2019 10/21/2019 10/21/2019 10/20/2019 10/20/2019    SBP (mmHg) 117 118 132 143 139    DBP (mmHg) 86 92 94 98 96    Pulse 68 67 65 77 72

## 2019-10-27 NOTE — PROGRESS NOTES
CHIEF COMPLAINT  Hypertension      HISTORY, ASSESSMENT, and PLAN    1. Essential hypertension    2. Coronary artery disease involving native coronary artery of native heart without angina pectoris    3. S/P coronary artery stent placement    4. MI, old    5. Herpes simplex infection of genitourinary system      Hypertension is Asymptomatic but worse. Otherwise, Chronic conditions are IMPROVED or at least STABLE and COMPENSATED/CONTROLLED.     Orders Placed This Encounter    Ambulatory Referral to Cardiology    [facility]    valACYclovir (VALTREX) 500 MG tablet    metoprolol succinate (TOPROL-XL) 50 MG 24 hr tablet    atorvastatin (LIPITOR) 40 MG tablet    Hypertension Digital Medicine ([facility]): Assign Onboarding Questionnaires       Problem List Items Addressed This Visit        Cardiac/Vascular    S/P coronary artery stent placement (Chronic)    Relevant Orders    Ambulatory Referral to Cardiology    Coronary artery disease involving native coronary artery of native heart without angina pectoris (Chronic)    Relevant Medications    metoprolol succinate (TOPROL-XL) 50 MG 24 hr tablet    atorvastatin (LIPITOR) 40 MG tablet    Other Relevant Orders    Ambulatory Referral to Cardiology    Essential hypertension - Primary (Chronic)    Relevant Medications    metoprolol succinate (TOPROL-XL) 50 MG 24 hr tablet    Other Relevant Orders    Hypertension Digital Medicine ([facility]) Enrollment Order (Completed)    Hypertension Digital Medicine ([facility]): Assign Onboarding Questionnaires (Completed)    MI, old    Relevant Medications    metoprolol succinate (TOPROL-XL) 50 MG 24 hr tablet    Other Relevant Orders    Ambulatory Referral to Cardiology       ID    Herpes simplex infection of genitourinary system (Chronic)    Relevant Medications    valACYclovir (VALTREX) 500 MG tablet           Outpatient Medications Prior to Visit   Medication Sig Dispense Refill    aspirin 81 MG Chew Take  81 mg by mouth.      DIPHENHYDRAMINE HCL (BENADRYL ORAL) Take by mouth.      atorvastatin (LIPITOR) 40 MG tablet TAKE 1/2 TABLET(20 MG) BY MOUTH EVERY EVENING 45 tablet 3    valACYclovir (VALTREX) 1000 MG tablet Take 1 tablet (1000 mg) by mouth once daily for 5 days. Start at onset of HSV outbreak. 20 tablet 1    AFLURIA QD 2019-20,3YR UP,,PF, 60 mcg (15 mcg x 4)/0.5 mL Syrg Inject 0.5 mLs as directed once.  0    benzonatate (TESSALON) 200 MG capsule TAKE ONE C TID PRN FOR COUGH  0    predniSONE (DELTASONE) 5 MG tablet Take 4 tablets by mouth daily for 3 days, then 3 tablets daily for 3 days, then 2 tablets daily for 3 days, then 1 tablet daily for 3 days (Patient not taking: Reported on 10/2/2019) 30 tablet 0     No facility-administered medications prior to visit.         Medications Ordered This Encounter   Medications    atorvastatin (LIPITOR) 40 MG tablet     Sig: Take 1 tablet (40 mg total) by mouth every evening.     Dispense:  90 tablet     Refill:  4    metoprolol succinate (TOPROL-XL) 50 MG 24 hr tablet     Sig: Take 1 tablet (50 mg total) by mouth once daily.     Dispense:  90 tablet     Refill:  4    valACYclovir (VALTREX) 500 MG tablet     Sig: Take 1 tablet (500 mg total) by mouth once daily.     Dispense:  90 tablet     Refill:  4       Medications Discontinued During This Encounter   Medication Reason    AFLURIA QD 2019-20,3YR UP,,PF, 60 mcg (15 mcg x 4)/0.5 mL Syrg Patient no longer taking    predniSONE (DELTASONE) 5 MG tablet Patient no longer taking    benzonatate (TESSALON) 200 MG capsule Patient no longer taking    valACYclovir (VALTREX) 1000 MG tablet Dose adjustment    atorvastatin (LIPITOR) 40 MG tablet Reorder        Follow up in about 2 weeks (around 10/16/2019) for re-evaluate problem(s) discussed today.    REVIEW OF SYSTEMS  CARDIOVASCULAR: No angina or orthopnea reported.   ENDOCRINE: No polyuria or polydipsia reported.     PHYSICAL EXAM  Vitals:    10/02/19 1113   BP: (!)  "142/98   BP Location: Left arm   Patient Position: Sitting   BP Method: Medium (Manual)   Pulse: 84   Resp: 16   Temp: 97.9 °F (36.6 °C)   TempSrc: Oral   SpO2: 98%   Weight: 87.5 kg (192 lb 14.4 oz)   Height: 5' 9.5" (1.765 m)   CONSTITUTIONAL: Vital signs noted. No apparent distress. Does not appear acutely ill or septic. Appears adequately hydrated.  HEENT: External ENT grossly unremarkable. Hearing grossly intact. Oropharynx moist.  PULM: Lungs clear. Breathing unlabored.  HEART: Auscultation reveals regular rate and rhythm without murmur, gallop or rub.  DERM: Skin warm and moist with normal turgor.  NEURO: There are no gross focal motor deficits or gross deficits of cranial nerves III-XII.  PSYCHIATRIC: Alert and conversant. Mood grossly neutral. Judgment and insight not grossly compromised.     TOTAL TIME evaluating and managing this patient for this encounter exceeded 15 minutes, the majority spent counseling and coordinating care for the listed diagnoses.   Documentation entered by me for this encounter may have been done in part using speech-recognition technology. Although I have made an effort to ensure accuracy, "sound like" errors may exist and should be interpreted in context. -NETTE Clark MD.   "

## 2019-11-04 ENCOUNTER — PATIENT OUTREACH (OUTPATIENT)
Dept: OTHER | Facility: OTHER | Age: 54
End: 2019-11-04

## 2019-11-04 NOTE — PROGRESS NOTES
Digital Medicine: Health  Follow-Up    The history is provided by the patient. No  was used.     Follow Up  Follow-up reason(s): routine education and goal follow-up      Routine Education Topics: eating patterns and physical activity      Intervention/Plan    There are no preventive care reminders to display for this patient.    Last 5 Patient Entered Readings                                      Current 30 Day Average: 123/86     Recent Readings 11/1/2019 11/1/2019 10/26/2019 10/25/2019 10/24/2019    SBP (mmHg) 117 117 128 128 114    DBP (mmHg) 83 87 86 88 85    Pulse 64 63 66 69 66                 Diet Screening   No change to diet.      Physical Activity Screening   No change to exercise routine.        Patient reports that he has a torn MCL. Patient reports that he may get with an orthopedic soon to see if surgery is needed to fix it.    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    Patient is not eligible for referral to smoking cessation.        SDOH

## 2019-11-18 ENCOUNTER — PATIENT OUTREACH (OUTPATIENT)
Dept: OTHER | Facility: OTHER | Age: 54
End: 2019-11-18

## 2019-11-18 NOTE — PROGRESS NOTES
Digital Medicine: Health  Follow-Up    The history is provided by the patient. No  was used.     Follow Up  Patient readings are beginning to look good. Asked patient to consistently take readings over the next two weeks to see what his blood pressure is doing. Will follow up in two weeks.      Intervention/Plan    There are no preventive care reminders to display for this patient.    Last 5 Patient Entered Readings                                      Current 30 Day Average: 125/89     Recent Readings 11/18/2019 11/18/2019 11/18/2019 11/15/2019 11/14/2019    SBP (mmHg) 129 133 120 127 122    DBP (mmHg) 89 93 90 93 86    Pulse 66 69 70 70 72             Screenings    SDOH

## 2019-12-02 ENCOUNTER — PATIENT OUTREACH (OUTPATIENT)
Dept: OTHER | Facility: OTHER | Age: 54
End: 2019-12-02

## 2019-12-02 NOTE — PROGRESS NOTES
Digital Medicine: Health  Follow-Up    The history is provided by the patient. No  was used.     Follow Up  Patient reports that he was feeling ill last week and was taking medication that was raising his blood pressure. Patient also reports that he did not each much at thanksgiving because they gave their turkey to less fortunate people. Rather they ate ham sandwiches.    Patient reports that he is feeling better and will begin taking his readings again starting tonight. Will follow up with patient in two weeks.    All other follow up information deferred.      Intervention/Plan    There are no preventive care reminders to display for this patient.    Last 5 Patient Entered Readings                                      Current 30 Day Average: 128/91     Recent Readings 11/28/2019 11/27/2019 11/26/2019 11/26/2019 11/26/2019    SBP (mmHg) 123 143 136 142 137    DBP (mmHg) 87 96 96 97 95    Pulse 62 69 84 85 72             Screenings    SDOH

## 2019-12-16 ENCOUNTER — PATIENT OUTREACH (OUTPATIENT)
Dept: OTHER | Facility: OTHER | Age: 54
End: 2019-12-16

## 2019-12-16 NOTE — PROGRESS NOTES
Digital Medicine: Health  Follow-Up    The history is provided by the patient. No  was used.     Follow Up  Patient reports that his readings have been looking a lot better. Patient reports that he has been taking time out of his day to relax more often. Patient reports that he used to get nervous when he took his readings, but has been working on that since last outreach.    Patient reports that he has been eating more healthy since last outreach. Patient reports that he has been staying away from his fried foods.    Patient reports that he did not have any questions. Will follow up with patient in two weeks.      Intervention/Plan    There are no preventive care reminders to display for this patient.    Last 5 Patient Entered Readings                                      Current 30 Day Average: 126/88     Recent Readings 12/15/2019 12/15/2019 12/14/2019 12/14/2019 12/13/2019    SBP (mmHg) 125 121 127 113 119    DBP (mmHg) 88 84 87 82 86    Pulse 73 64 73 61 62             Screenings    SDOH

## 2019-12-30 ENCOUNTER — PATIENT OUTREACH (OUTPATIENT)
Dept: OTHER | Facility: OTHER | Age: 54
End: 2019-12-30

## 2019-12-30 NOTE — PROGRESS NOTES
Digital Medicine: Health  Follow-Up    The history is provided by the patient. No  was used.     Follow Up  Patient reports that he hasn't been exercising like he wishes that he could. Patient reports that work gets in the way and that his new dog is a lot of responsibility.    Patient reports that he has been drinking diet 7Up. Patient reports that he does not eat extreme amounts of sodium. Patient does express that he does like sodium in his food.    Patient reports that he does have a stent in his arteries and that he has near 90% blockage in some of them.    Overall, patient reports that he feels well overall. Will follow up with patient in two weeks.    Intervention/Plan    There are no preventive care reminders to display for this patient.    Last 5 Patient Entered Readings                                      Current 30 Day Average: 123/86     Recent Readings 12/30/2019 12/30/2019 12/28/2019 12/26/2019 12/25/2019    SBP (mmHg) 124 128 125 124 125    DBP (mmHg) 87 91 89 81 86    Pulse 57 58 56 61 65             Screenings    SDOH

## 2020-01-09 ENCOUNTER — TELEPHONE (OUTPATIENT)
Dept: CARDIOLOGY | Facility: CLINIC | Age: 55
End: 2020-01-09

## 2020-01-09 NOTE — TELEPHONE ENCOUNTER
Pt canceled all appts today due to work. He will call back to reschedule- doesn't really know when he will be available again.      ----- Message from Gayle Flowers sent at 1/8/2020 10:20 AM CST -----  Contact: PT   The patient is calling in regards to rescheduling all of his test due to not being able to get off work,please advise as soon as possible [phone]

## 2020-01-14 ENCOUNTER — PATIENT OUTREACH (OUTPATIENT)
Dept: OTHER | Facility: OTHER | Age: 55
End: 2020-01-14

## 2020-01-28 NOTE — PROGRESS NOTES
Digital Medicine: Health  Follow-Up    The history is provided by the patient. No  was used.     Follow Up  Patient reports that overall he is doing well. He thinks that his cuff is messing up. Patient reports that when he puts it on his arm, he needs to take his readings at least twice because it doesn't stay pressurized. Patient reports that it could be a 10 point difference. Patient reports that this has been going on for about 6 months.    Overall the patient reports that he is doing well. Patient reports that he is keeping up with his diet and exercise. Patient reported that he got a new grill. Patient reported that he recently cooked some filet mignon on Sunday.    Asked patient if he could start taking his readings again for us and the patient reported that he would start taking them again.    Will follow up with the patient in two weeks.      Intervention/Plan    There are no preventive care reminders to display for this patient.    Last 5 Patient Entered Readings                                      Current 30 Day Average: 122/87     Recent Readings 1/14/2020 1/14/2020 1/7/2020 1/6/2020 1/3/2020    SBP (mmHg) 122 132 116 123 115    DBP (mmHg) 86 90 86 84 80    Pulse 65 65 79 59 65                  Screenings    SDOH

## 2020-02-26 ENCOUNTER — PATIENT OUTREACH (OUTPATIENT)
Dept: OTHER | Facility: OTHER | Age: 55
End: 2020-02-26

## 2020-03-10 NOTE — PROGRESS NOTES
Digital Medicine: Health  Follow-Up    The history is provided by the patient. No  was used.     Follow Up  Patient reported that he has been super busy so it was hard for him to remember to take his blood pressure readings. 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    Patient reported that his allergies have been really messing with him lately. Patient reported that he is doing well and is going to start taking his readings again.    Patient reported that he denies signs and symptoms of high blood pressure.    Intervention/Plan    There are no preventive care reminders to display for this patient.    Last 5 Patient Entered Readings                                      Current 30 Day Average: 129/88     Recent Readings 3/1/2020 2/29/2020 1/28/2020 1/28/2020 1/14/2020    SBP (mmHg) 130 127 127 133 122    DBP (mmHg) 88 88 87 81 86    Pulse 72 64 79 75 65             Screenings    SDOH

## 2020-03-27 ENCOUNTER — PATIENT OUTREACH (OUTPATIENT)
Dept: OTHER | Facility: OTHER | Age: 55
End: 2020-03-27

## 2020-04-13 NOTE — PROGRESS NOTES
Digital Medicine: Health  Follow-Up    The history is provided by the patient. No  was used.     Follow Up  Patient reported that he is doing well.    Patient reported that he has been forgetting to take his blood pressure but he wants to start taking them again. He stated that he has been super busy with work lately.    He stated that he was having some issues with his allergies but attributes that to the pollen that has been in the air lately.    Patient reported that he has been alcohol and nicotine free for close to 10 years.    Overall the patient is doing well and denies further questions or concerns.    Intervention/Plan    There are no preventive care reminders to display for this patient.    Last 5 Patient Entered Readings                                      Current 30 Day Average: 122/84     Recent Readings 3/25/2020 3/24/2020 3/23/2020 3/14/2020 3/13/2020    SBP (mmHg) 118 117 132 119 127    DBP (mmHg) 77 86 90 82 90    Pulse 69 65 68 63 72             Screenings    SDOH

## 2020-05-22 ENCOUNTER — PATIENT OUTREACH (OUTPATIENT)
Dept: OTHER | Facility: OTHER | Age: 55
End: 2020-05-22

## 2020-05-22 NOTE — PROGRESS NOTES
Digital Medicine: Clinician Introduction    Delon Elder is a 54 y.o. male who is newly enrolled in the Digital Medicine Clinic.    The following information was reviewed and updated:  Preferred pharmacy   [id number] - [place] - [address] AT [facility], [place]  [address]  [place] [address]  Phone: [phone] Fax: [phone]    [facility] #[id number] - [place]  [address] [facility] [address]  [place] [address]  Phone: [phone] Fax: [phone]      Review of patient's allergies indicates:   Allergen Reactions    Sulfa (sulfonamide antibiotics) Shortness Of Breath       Called Mr. Delon Elder to introduce myself as clinical pharmacist in the Hypertension Digital Medicine program. Mr. Jernigan reports that things are going well. He has no questions/concerns regarding his blood pressure or blood pressure medication but he does have several questions regarding weight loss and his diet. He takes Benadryl regularly for allergies and reports daytime drowsiness. He also reports that he believes that atorvastatin is causing muscle aches/pain. He would like to be switched to a different cholesterol agent.      The history is provided by the patient. No  was used.     HYPERTENSION  Our goal is to get BP to consistently below 130/80mmHg and make the process convenient so patient can avoid extra trips to the office. Getting your blood pressure below 130/80mmHg (definition of control) will reduce your risk for heart attack, kidney failure, stroke and death (as well as kidney failure, eye disease, & dementia)      Reviewed non-pharmacologic therapies and impact on BP      Explained that we expect patient to obtain several blood pressures per week at random times of day.  Instructed patient not to allow anyone else to use phone and monitoring device.  Confirmed appropriate BP monitoring technique.       Explained to patient that the digital medicine team is not available for emergencies.  Patient will call [facility] on-call ([phone] or [phone]) or [phone] if needed.    Patient's BP goal is 130/80. Patients BP average is 126/87 mmHg, which is above goal, per 2017 ACC/AHA Hypertension Guidelines.      Med Review complete.    Allergies reviewed.      Last 5 Patient Entered Readings                                      Current 30 Day Average: 126/87     Recent Readings 5/19/2020 5/19/2020 5/19/2020 5/6/2020 4/27/2020    SBP (mmHg) 122 129 123 126 131    DBP (mmHg) 85 84 84 89 90    Pulse 66 67 68 60 70            INTERVENTION(S)  encouragement/support    PLAN  patient verbalizes understanding, patient amenable to changes, await MD intervention, Health  follow up and continue monitoring    Current 30-day BP avg of 126/87 is uncontrolled, does not meet goal of <130/80.  Reviewed readings: trending downwards. DBP is consistently elevated. SBP is intermittently controlled. Should see improvements in BP with weight loss.  - Discussed atorvastatin ADRs. Deferred med change to PCP by sending a message.   - Taking Benadryl regularly. Discussed ADR of drowsiness and may be causing daytime drowsiness that patient is experiencing. Recommended trying OTC allergy products as Claritin, Allegra. PCP can also prescribe allergy agent.  - Discussed weight loss. Identified diet changes he can make at home such as carb reduction, reducing sugar consumption, low caloric intake. Told patient about Saxenda injection and to request more information from his PCP.     Continue current regimen.  If no improvement in DBP, consider low dose amlodipine for better control.   Request Health  f/u on diet.  Message sent to PCP Eduardo regarding atorvastatin and weight loss drug.     Follow-up in 4 weeks.       There are no preventive care reminders to display for this patient.    Current Medication Regimen:  Hypertension  Medications             metoprolol succinate (TOPROL-XL) 50 MG 24 hr tablet Take 1 tablet (50 mg total) by mouth once daily.            Reviewed the importance of self-monitoring, medication adherence, and that the health  can be used as a resource for lifestyle modifications to help reduce or maintain a healthy lifestyle.    Sent link to [facility] Digital Medicine webpages and my contact information via [employer] for future questions. Follow up scheduled.    Patient asked: How can I contact my doctor?  Advised patient that he can message his doctor      Patient asked: I would like to loss weight. What medications are available for weight loss?     Patient asked:       Screenings

## 2020-05-25 ENCOUNTER — TELEPHONE (OUTPATIENT)
Dept: INTERNAL MEDICINE | Facility: CLINIC | Age: 55
End: 2020-05-25

## 2020-05-25 NOTE — TELEPHONE ENCOUNTER
----- Message from NETTE Clark MD sent at 5/22/2020  6:17 PM CDT -----  I am not working in the clinic at present and cannot properly respond to this task/request. Please forward this task/request to an in-clinic provider for their determination and disposition. Thank you.   ----- Message -----  From: Hien Espinal PharmD  Sent: 5/22/2020  11:58 AM CDT  To: NETTE Clark MD    Hi Dr. Clark,    Delon Tan reports issues of muscles aches/pain due to atorvastatin. He is interested in being switched to a different agent that is less likely to cause this SE.     He is taking Benadryl daily for allergies. Recommended Claritin, Clarinex, Allegra or Xyzal due to sedating affects of Benadryl. Patient experiencing daytime drowsiness.     Lastly, he is interested in weight loss agent. I told him about Saxenda injection. He will likely be reaching out for more details.     Kind regards,  Hien Espinal, PharmD   Digital Medicine Clinical Pharmacist  [phone]

## 2020-05-25 NOTE — TELEPHONE ENCOUNTER
Spoke with patient and offered to schedule him an appointment to be seen by one of our providers this week to discuss his cholesterol medicine and other issues. He stated that he wanted to wait and see Dr. Clark. Appointment scheduled for June 11th. Patient verbalized understanding.

## 2020-05-26 ENCOUNTER — PATIENT OUTREACH (OUTPATIENT)
Dept: OTHER | Facility: OTHER | Age: 55
End: 2020-05-26

## 2020-05-26 NOTE — PROGRESS NOTES
Digital Medicine: Health  Follow-Up    The history is provided by the patient. No  was used.     Follow Up  Called patient for routine follow up.    Also responding to a task that the patients clinician put for me. Educated the patient on weight loss strategies to lose some weight. Patient reports that he has some knowledge about weight loss and the foods that he should avoid. Educated the patient on what a calorie is and the best way to lose weight by burning more calories than he consumes each day.    Patient reports that his diet is not awful but there are some things that he could work on to try eat better. He does report that he does not exercise enough to burn the excess calories that he takes in.    Patient set a goal to increase his physical activity by walking his dog for 15 minutes every day for the next two weeks.    Will follow up with the patient in a couple weeks.    Intervention/Plan    There are no preventive care reminders to display for this patient.    Last 5 Patient Entered Readings                                      Current 30 Day Average: 126/87     Recent Readings 5/19/2020 5/19/2020 5/19/2020 5/6/2020 4/27/2020    SBP (mmHg) 122 129 123 126 131    DBP (mmHg) 85 84 84 89 90    Pulse 66 67 68 60 70                  Screenings    SDOH

## 2020-06-09 ENCOUNTER — PATIENT OUTREACH (OUTPATIENT)
Dept: OTHER | Facility: OTHER | Age: 55
End: 2020-06-09

## 2020-06-09 NOTE — PROGRESS NOTES
Digital Medicine: Health  Follow-Up    The history is provided by the patient. No  was used.   Follow Up  Follow-up reason(s): goal follow-up      Called patient for routine follow up.    Patient reports that he has not done to great with the goal that we set during the last outreach. The patient reports that with everything opening back up he has been super busy. Expressed to the patient that his BP readings have started looking great.    Overall the patient is doing well and denies any other questions or concerns at the current time.    Will follow up with the patient in a few weeks.    Intervention/Plan    There are no preventive care reminders to display for this patient.    Last 5 Patient Entered Readings                                      Current 30 Day Average: 117/84     Recent Readings 6/4/2020 6/4/2020 6/4/2020 6/4/2020 5/19/2020    SBP (mmHg) 112 113 119 122 122    DBP (mmHg) 83 83 83 89 85    Pulse 72 75 77 74 66             Screenings    SDOH

## 2020-06-29 ENCOUNTER — OFFICE VISIT (OUTPATIENT)
Dept: INTERNAL MEDICINE | Facility: CLINIC | Age: 55
End: 2020-06-29
Payer: COMMERCIAL

## 2020-06-29 ENCOUNTER — LAB VISIT (OUTPATIENT)
Dept: LAB | Facility: HOSPITAL | Age: 55
End: 2020-06-29
Attending: FAMILY MEDICINE
Payer: COMMERCIAL

## 2020-06-29 VITALS
WEIGHT: 195.75 LBS | TEMPERATURE: 97 F | HEART RATE: 70 BPM | BODY MASS INDEX: 28.02 KG/M2 | DIASTOLIC BLOOD PRESSURE: 84 MMHG | SYSTOLIC BLOOD PRESSURE: 126 MMHG | OXYGEN SATURATION: 98 % | HEIGHT: 70 IN

## 2020-06-29 DIAGNOSIS — Z11.4 SCREENING FOR HIV WITHOUT PRESENCE OF RISK FACTORS: ICD-10-CM

## 2020-06-29 DIAGNOSIS — I10 ESSENTIAL HYPERTENSION: Chronic | ICD-10-CM

## 2020-06-29 DIAGNOSIS — Z00.00 PREVENTATIVE HEALTH CARE: Primary | ICD-10-CM

## 2020-06-29 DIAGNOSIS — Z12.11 SCREENING FOR COLORECTAL CANCER: ICD-10-CM

## 2020-06-29 DIAGNOSIS — Z79.899 ENCOUNTER FOR LONG-TERM CURRENT USE OF MEDICATION: ICD-10-CM

## 2020-06-29 DIAGNOSIS — A60.00 HERPES SIMPLEX INFECTION OF GENITOURINARY SYSTEM: Chronic | ICD-10-CM

## 2020-06-29 DIAGNOSIS — I25.10 CORONARY ARTERY DISEASE INVOLVING NATIVE CORONARY ARTERY OF NATIVE HEART WITHOUT ANGINA PECTORIS: Chronic | ICD-10-CM

## 2020-06-29 DIAGNOSIS — Z12.12 SCREENING FOR COLORECTAL CANCER: ICD-10-CM

## 2020-06-29 PROCEDURE — 3074F PR MOST RECENT SYSTOLIC BLOOD PRESSURE < 130 MM HG: ICD-10-PCS | Mod: CPTII,S$GLB,, | Performed by: FAMILY MEDICINE

## 2020-06-29 PROCEDURE — 3079F PR MOST RECENT DIASTOLIC BLOOD PRESSURE 80-89 MM HG: ICD-10-PCS | Mod: CPTII,S$GLB,, | Performed by: FAMILY MEDICINE

## 2020-06-29 PROCEDURE — 3008F PR BODY MASS INDEX (BMI) DOCUMENTED: ICD-10-PCS | Mod: CPTII,S$GLB,, | Performed by: FAMILY MEDICINE

## 2020-06-29 PROCEDURE — 80061 LIPID PANEL: CPT

## 2020-06-29 PROCEDURE — 99999 PR PBB SHADOW E&M-EST. PATIENT-LVL III: ICD-10-PCS | Mod: PBBFAC,,, | Performed by: FAMILY MEDICINE

## 2020-06-29 PROCEDURE — 99999 PR PBB SHADOW E&M-EST. PATIENT-LVL III: CPT | Mod: PBBFAC,,, | Performed by: FAMILY MEDICINE

## 2020-06-29 PROCEDURE — 80053 COMPREHEN METABOLIC PANEL: CPT

## 2020-06-29 PROCEDURE — 99396 PREV VISIT EST AGE 40-64: CPT | Mod: S$GLB,,, | Performed by: FAMILY MEDICINE

## 2020-06-29 PROCEDURE — 86703 HIV-1/HIV-2 1 RESULT ANTBDY: CPT

## 2020-06-29 PROCEDURE — 99396 PR PREVENTIVE VISIT,EST,40-64: ICD-10-PCS | Mod: S$GLB,,, | Performed by: FAMILY MEDICINE

## 2020-06-29 PROCEDURE — 3008F BODY MASS INDEX DOCD: CPT | Mod: CPTII,S$GLB,, | Performed by: FAMILY MEDICINE

## 2020-06-29 PROCEDURE — 3074F SYST BP LT 130 MM HG: CPT | Mod: CPTII,S$GLB,, | Performed by: FAMILY MEDICINE

## 2020-06-29 PROCEDURE — 36415 COLL VENOUS BLD VENIPUNCTURE: CPT

## 2020-06-29 PROCEDURE — 3079F DIAST BP 80-89 MM HG: CPT | Mod: CPTII,S$GLB,, | Performed by: FAMILY MEDICINE

## 2020-06-30 LAB
ALBUMIN SERPL BCP-MCNC: 4.2 G/DL (ref 3.5–5.2)
ALP SERPL-CCNC: 70 U/L (ref 55–135)
ALT SERPL W/O P-5'-P-CCNC: 21 U/L (ref 10–44)
ANION GAP SERPL CALC-SCNC: 7 MMOL/L (ref 8–16)
AST SERPL-CCNC: 19 U/L (ref 10–40)
BILIRUB SERPL-MCNC: 0.4 MG/DL (ref 0.1–1)
BUN SERPL-MCNC: 9 MG/DL (ref 6–20)
CALCIUM SERPL-MCNC: 9.4 MG/DL (ref 8.7–10.5)
CHLORIDE SERPL-SCNC: 107 MMOL/L (ref 95–110)
CHOLEST SERPL-MCNC: 186 MG/DL (ref 120–199)
CHOLEST/HDLC SERPL: 4.5 {RATIO} (ref 2–5)
CO2 SERPL-SCNC: 28 MMOL/L (ref 23–29)
CREAT SERPL-MCNC: 1.1 MG/DL (ref 0.5–1.4)
EST. GFR  (AFRICAN AMERICAN): >60 ML/MIN/1.73 M^2
EST. GFR  (NON AFRICAN AMERICAN): >60 ML/MIN/1.73 M^2
GLUCOSE SERPL-MCNC: 63 MG/DL (ref 70–110)
HDLC SERPL-MCNC: 41 MG/DL (ref 40–75)
HDLC SERPL: 22 % (ref 20–50)
LDLC SERPL CALC-MCNC: 97.8 MG/DL (ref 63–159)
NONHDLC SERPL-MCNC: 145 MG/DL
POTASSIUM SERPL-SCNC: 4.6 MMOL/L (ref 3.5–5.1)
PROT SERPL-MCNC: 7 G/DL (ref 6–8.4)
SODIUM SERPL-SCNC: 142 MMOL/L (ref 136–145)
TRIGL SERPL-MCNC: 236 MG/DL (ref 30–150)

## 2020-07-01 LAB — HIV 1+2 AB+HIV1 P24 AG SERPL QL IA: NEGATIVE

## 2020-07-01 NOTE — PROGRESS NOTES
"Hi, Delon.    I'm happy to report that these test results are fine and do not require a change in treatment.    Thanks for letting me care for you, thanks for trusting us with your healthcare needs, and thanks for using [facility].    Sincerely,    NETTE Clark MD  --------------------------------------------------------------------------------   Want to learn more about your test results and what they mean? (1) Log in to your [facility] account at [email or web address]. (2) From the "View test results" tab, click on the test you want to know more about. (3) Click on the "About This Test" link."

## 2020-07-02 ENCOUNTER — TELEPHONE (OUTPATIENT)
Dept: ENDOSCOPY | Facility: HOSPITAL | Age: 55
End: 2020-07-02

## 2020-07-16 ENCOUNTER — PATIENT OUTREACH (OUTPATIENT)
Dept: ADMINISTRATIVE | Facility: HOSPITAL | Age: 55
End: 2020-07-16

## 2020-07-16 NOTE — PROGRESS NOTES
Verifying fit kit collection date    Patient states he doesn't remember the day he collected the sample    FitKit was mailed to patient on 7/16/2020 1:10 PM

## 2020-07-19 NOTE — PROGRESS NOTES
WELLNESS VISIT (PREVENTIVE SERVICES)    CHIEF COMPLAINT  Annual Wellness Exam    HEALTH MAINTENANCE INTERVENTIONS - UP TO DATE  Health Maintenance Topics with due status: Not Due       Topic Last Completion Date    Influenza Vaccine 09/25/2019    High Dose Statin 06/29/2020    Aspirin/Antiplatelet Therapy 06/29/2020    Lipid Panel 06/29/2020       HEALTH MAINTENANCE INTERVENTIONS - DUE OR DUE SOON  Health Maintenance Due   Topic Date Due    TETANUS VACCINE  07/16/1984    Colorectal Cancer Screening  04/18/2019       DIAGNOSES, HISTORY, ASSESSMENT, AND PLAN  Assessment   1. Preventative health care    2. Essential hypertension    3. Coronary artery disease involving native coronary artery of native heart without angina pectoris    4. Herpes simplex infection of genitourinary system    5. Encounter for long-term current use of medication    6. Screening for colorectal cancer    7. Screening for HIV without presence of risk factors       Chronic conditions are IMPROVED or at least STABLE and COMPENSATED/CONTROLLED. No new complaints or concerns reported.     Plan   Problem List Items Addressed This Visit     Coronary artery disease involving native coronary artery of native heart without angina pectoris (Chronic)    Relevant Orders    Lipid Panel (Completed)    Essential hypertension (Chronic)    Relevant Orders    Lipid Panel (Completed)    Herpes simplex infection of genitourinary system (Chronic)      Other Visit Diagnoses     Preventative health care    -  Primary    Encounter for long-term current use of medication        Relevant Orders    Comprehensive metabolic panel (Completed)    Screening for colorectal cancer        Relevant Orders    Case request GI: COLONOSCOPY (Completed)    Screening for HIV without presence of risk factors        Relevant Orders    HIV 1/2 Ag/Ab (4th Gen) (Completed)          Orders Placed This Encounter    Comprehensive metabolic panel    Lipid Panel    HIV 1/2 Ag/Ab (4th Gen)     Case request GI: COLONOSCOPY       Outpatient Medications Prior to Visit   Medication Sig Dispense Refill    aspirin 81 MG Chew Take 81 mg by mouth.      atorvastatin (LIPITOR) 40 MG tablet Take 1 tablet (40 mg total) by mouth every evening. 90 tablet 4    metoprolol succinate (TOPROL-XL) 50 MG 24 hr tablet Take 1 tablet (50 mg total) by mouth once daily. (Patient taking differently: Take 25 mg by mouth once daily. ) 90 tablet 4    albuterol (PROVENTIL) 5 mg/mL nebulizer solution Take 2.5 mg by nebulization every 6 (six) hours as needed for Wheezing. Rescue      DIPHENHYDRAMINE HCL (BENADRYL ORAL) Take by mouth.      valACYclovir (VALTREX) 500 MG tablet Take 1 tablet (500 mg total) by mouth once daily. (Patient not taking: Reported on 6/29/2020) 90 tablet 4     No facility-administered medications prior to visit.         There are no discontinued medications.          Follow up in about 11 months (around 5/29/2021) for wellness and preventive services.     Subjective   Review of Systems   Constitutional: Negative for chills and fever.   HENT: Negative for ear pain and trouble swallowing.    Eyes: Negative for pain and visual disturbance.   Respiratory: Negative for chest tightness and shortness of breath.    Gastrointestinal: Negative for abdominal pain and blood in stool.   Endocrine: Negative for polydipsia and polyuria.   Genitourinary: Negative.  Negative for difficulty urinating, dysuria and hematuria.   Musculoskeletal: Negative for gait problem and joint swelling.   Integumentary:  Negative for rash and wound.   Neurological: Negative for vertigo and seizures.   Hematological: Negative.    Psychiatric/Behavioral: Negative for hallucinations and suicidal ideas.        Objective   Vitals:    06/29/20 1643   BP: 126/84   BP Location: Left arm   Patient Position: Sitting   BP Method: Large (Manual)   Pulse: 70   Temp: 96.8 °F (36 °C)   TempSrc: Tympanic   SpO2: 98%   Weight: 88.8 kg (195 lb 12.3 oz)  "  Height: 5' 9.5" (1.765 m)     Physical Exam  Vitals signs reviewed.   Constitutional:       General: He is not in acute distress.     Appearance: Normal appearance. He is not ill-appearing, toxic-appearing or diaphoretic.   HENT:      Head: Normocephalic and atraumatic.      Ears:      Comments: Hearing grossly intact.  Eyes:      General: No scleral icterus.     Conjunctiva/sclera: Conjunctivae normal.   Cardiovascular:      Rate and Rhythm: Normal rate and regular rhythm.   Pulmonary:      Effort: Pulmonary effort is normal.      Breath sounds: Normal breath sounds.   Skin:     General: Skin is warm and dry.   Neurological:      General: No focal deficit present.      Mental Status: He is alert and oriented to person, place, and time.   Psychiatric:         Mood and Affect: Mood normal.         Behavior: Behavior normal.         Judgment: Judgment normal.           PAST MEDICAL HISTORY  He has a past medical history of Acute coronary syndrome, CAD (coronary artery disease), Essential hypertension, and Hyperlipidemia.    SURGICAL HISTORY  He has a past surgical history that includes Appendectomy; Cardiac catheterization; and Coronary angioplasty.    FAMILY HISTORY  His family history includes Hyperlipidemia in his father and mother.     ALLERGIES  Review of patient's allergies indicates:   Allergen Reactions    Sulfa (sulfonamide antibiotics) Shortness Of Breath       SOCIAL HISTORY   TOBACCO USE HISTORY: He reports that he quit smoking about 10 years ago. He quit after 0.00 years of use. He has never used smokeless tobacco.   ALCOHOL USE HISTORY:  He reports no history of alcohol use.   RECREATIONAL DRUG USE HISTORY:  He reports no history of drug use.    Documentation entered by me for this encounter may have been done in part using speech-recognition technology. Although I have made an effort to ensure accuracy, "sound like" errors may exist and should be interpreted in context. -NETTE Clark MD.   "

## 2020-07-21 ENCOUNTER — PATIENT OUTREACH (OUTPATIENT)
Dept: ADMINISTRATIVE | Facility: HOSPITAL | Age: 55
End: 2020-07-21

## 2020-07-21 NOTE — PROGRESS NOTES
Contacted patient to follow up on overdue fit kit. Patient states hasn't received the kit that was mailed to him 07/16/2020 at this time

## 2020-08-12 ENCOUNTER — PATIENT OUTREACH (OUTPATIENT)
Dept: OTHER | Facility: OTHER | Age: 55
End: 2020-08-12

## 2020-08-12 NOTE — PROGRESS NOTES
Digital Medicine: Health  Follow-Up    The history is provided by the patient.                 Patient needs assistance troubleshooting: patient reminder needed.      Topics Covered on Call: physical activity and Diet    Additional Follow-up details: He reports that he quit a job that was providing insurance for him.        Diet-Change    Patient reports eating or drinking the following: Patient reports that he has been taking a green tea pill that has been keeping his appetite low. He reports that he hasn't been eating as much as he used to.    He also started taking a sugar pill that has been helping him with his appetite as well.      Physical Activity-no change to routine  No change to exercise routine.       Additional physical activity details: Patient reports that he has been exercising still.      Medication Adherence-Medication adherence was assessed.        Substance, Sleep, Stress-No change  stress-  Details:  Intervention(s):    Sleep-  Details:  Intervention(s):    Alcohol -  Details:  Intervention(s):    Tobacco-  Details:  Intervention(s):          Additional monitoring needed.       Addressed any questions or concerns and patient has my contact information if needed prior to next outreach.   Explained the importance of self-monitoring and medication adherence. Encouraged the patient to communicate with their health  for lifestyle modifications to help improve or maintain a healthy lifestyle.            There are no preventive care reminders to display for this patient.    Last 5 Patient Entered Readings                                      Current 30 Day Average: 126/87     Recent Readings 8/8/2020 7/27/2020 7/9/2020 7/4/2020 6/4/2020    SBP (mmHg) 136 116 109 126 112    DBP (mmHg) 89 84 78 86 83    Pulse 62 70 62 80 72

## 2020-10-05 ENCOUNTER — PATIENT MESSAGE (OUTPATIENT)
Dept: ADMINISTRATIVE | Facility: HOSPITAL | Age: 55
End: 2020-10-05

## 2020-10-14 DIAGNOSIS — I25.10 CORONARY ARTERY DISEASE INVOLVING NATIVE CORONARY ARTERY OF NATIVE HEART WITHOUT ANGINA PECTORIS: Chronic | ICD-10-CM

## 2020-10-14 RX ORDER — ATORVASTATIN CALCIUM 40 MG/1
40 TABLET, FILM COATED ORAL NIGHTLY
Qty: 90 TABLET | Refills: 4 | Status: SHIPPED | OUTPATIENT
Start: 2020-10-14 | End: 2021-12-20 | Stop reason: SDUPTHER

## 2020-10-14 NOTE — TELEPHONE ENCOUNTER
REFILL APPROVED    Medications Ordered This Encounter   Medications    atorvastatin (LIPITOR) 40 MG tablet     Sig: Take 1 tablet (40 mg total) by mouth every evening.     Dispense:  90 tablet     Refill:  4

## 2020-12-03 ENCOUNTER — PATIENT OUTREACH (OUTPATIENT)
Dept: ADMINISTRATIVE | Facility: HOSPITAL | Age: 55
End: 2020-12-03

## 2020-12-03 NOTE — PROGRESS NOTES
Contacted patient to follow up on overdue Colonoscopy. Patient states he'll be getting insurance in a few weeks and will call back to schedule

## 2021-01-29 ENCOUNTER — PATIENT MESSAGE (OUTPATIENT)
Dept: ADMINISTRATIVE | Facility: HOSPITAL | Age: 56
End: 2021-01-29

## 2021-04-28 ENCOUNTER — PATIENT MESSAGE (OUTPATIENT)
Dept: RESEARCH | Facility: HOSPITAL | Age: 56
End: 2021-04-28

## 2021-08-10 ENCOUNTER — PATIENT MESSAGE (OUTPATIENT)
Dept: ADMINISTRATIVE | Facility: HOSPITAL | Age: 56
End: 2021-08-10

## 2021-10-05 ENCOUNTER — PATIENT MESSAGE (OUTPATIENT)
Dept: ADMINISTRATIVE | Facility: HOSPITAL | Age: 56
End: 2021-10-05

## 2021-10-05 ENCOUNTER — PATIENT MESSAGE (OUTPATIENT)
Dept: ADMINISTRATIVE | Facility: OTHER | Age: 56
End: 2021-10-05

## 2021-10-06 ENCOUNTER — PATIENT OUTREACH (OUTPATIENT)
Dept: ADMINISTRATIVE | Facility: HOSPITAL | Age: 56
End: 2021-10-06

## 2021-10-26 ENCOUNTER — PATIENT MESSAGE (OUTPATIENT)
Dept: ADMINISTRATIVE | Facility: HOSPITAL | Age: 56
End: 2021-10-26

## 2021-12-16 DIAGNOSIS — I25.10 CORONARY ARTERY DISEASE INVOLVING NATIVE CORONARY ARTERY OF NATIVE HEART WITHOUT ANGINA PECTORIS: Chronic | ICD-10-CM

## 2021-12-17 RX ORDER — ATORVASTATIN CALCIUM 40 MG/1
TABLET, FILM COATED ORAL
Qty: 90 TABLET | Refills: 4 | OUTPATIENT
Start: 2021-12-17

## 2021-12-20 DIAGNOSIS — I25.10 CORONARY ARTERY DISEASE INVOLVING NATIVE CORONARY ARTERY OF NATIVE HEART WITHOUT ANGINA PECTORIS: Chronic | ICD-10-CM

## 2021-12-22 RX ORDER — ATORVASTATIN CALCIUM 40 MG/1
40 TABLET, FILM COATED ORAL NIGHTLY
Qty: 30 TABLET | Refills: 1 | Status: SHIPPED | OUTPATIENT
Start: 2021-12-22 | End: 2022-04-06

## 2022-01-23 DIAGNOSIS — I25.10 CORONARY ARTERY DISEASE INVOLVING NATIVE CORONARY ARTERY OF NATIVE HEART WITHOUT ANGINA PECTORIS: Chronic | ICD-10-CM

## 2022-01-23 NOTE — TELEPHONE ENCOUNTER
No new care gaps identified.  Powered by [facility] [employer]. Reference number: [id number].   1/23/2022 4:17:09 AM CST

## 2022-01-26 RX ORDER — ATORVASTATIN CALCIUM 40 MG/1
TABLET, FILM COATED ORAL
Qty: 90 TABLET | OUTPATIENT
Start: 2022-01-26

## 2022-01-26 NOTE — TELEPHONE ENCOUNTER
REFILL REFUSED  REASON: It has been over a year since I last treated him. He should have sufficient supply of his medicine to last until his next appointment. He has upcoming appointment very soon. We will address this and any other refills at that appointment.    Requested Prescriptions     Pending Prescriptions Disp Refills    atorvastatin (LIPITOR) 40 MG tablet [Pharmacy Med Name: ATORVASTATIN 40MG TABLETS] 90 tablet      Sig: TAKE 1 TABLET(40 MG) BY MOUTH EVERY EVENING      #LMRX

## 2022-02-04 ENCOUNTER — HOSPITAL ENCOUNTER (OUTPATIENT)
Dept: CARDIOLOGY | Facility: HOSPITAL | Age: 57
Discharge: HOME | End: 2022-02-04
Attending: FAMILY MEDICINE

## 2022-02-04 ENCOUNTER — OFFICE VISIT (OUTPATIENT)
Dept: INTERNAL MEDICINE | Facility: CLINIC | Age: 57
End: 2022-02-04

## 2022-02-04 VITALS
SYSTOLIC BLOOD PRESSURE: 120 MMHG | TEMPERATURE: 99 F | HEART RATE: 63 BPM | WEIGHT: 193.13 LBS | BODY MASS INDEX: 27.65 KG/M2 | HEIGHT: 70 IN | DIASTOLIC BLOOD PRESSURE: 86 MMHG | OXYGEN SATURATION: 97 %

## 2022-02-04 DIAGNOSIS — Z00.00 PREVENTATIVE HEALTH CARE: Primary | ICD-10-CM

## 2022-02-04 DIAGNOSIS — I25.10 CORONARY ARTERY DISEASE INVOLVING NATIVE CORONARY ARTERY OF NATIVE HEART WITHOUT ANGINA PECTORIS: Chronic | ICD-10-CM

## 2022-02-04 DIAGNOSIS — Z79.899 ON STATIN THERAPY DUE TO RISK OF FUTURE CARDIOVASCULAR EVENT: ICD-10-CM

## 2022-02-04 DIAGNOSIS — I10 ESSENTIAL HYPERTENSION: Chronic | ICD-10-CM

## 2022-02-04 DIAGNOSIS — Z28.21 IMMUNIZATION NOT CARRIED OUT BECAUSE OF PATIENT REFUSAL: ICD-10-CM

## 2022-02-04 PROCEDURE — 93005 ELECTROCARDIOGRAM TRACING: CPT

## 2022-02-04 PROCEDURE — 99999 PR PBB SHADOW E&M-EST. PATIENT-LVL V: ICD-10-PCS | Mod: PBBFAC,,, | Performed by: FAMILY MEDICINE

## 2022-02-04 PROCEDURE — 99215 OFFICE O/P EST HI 40 MIN: CPT | Mod: PBBFAC | Performed by: FAMILY MEDICINE

## 2022-02-04 PROCEDURE — 99396 PREV VISIT EST AGE 40-64: CPT | Mod: S$PBB,,, | Performed by: FAMILY MEDICINE

## 2022-02-04 PROCEDURE — 93010 EKG 12-LEAD: ICD-10-PCS | Mod: ,,, | Performed by: INTERNAL MEDICINE

## 2022-02-04 PROCEDURE — 99999 PR PBB SHADOW E&M-EST. PATIENT-LVL V: CPT | Mod: PBBFAC,,, | Performed by: FAMILY MEDICINE

## 2022-02-04 PROCEDURE — 93010 ELECTROCARDIOGRAM REPORT: CPT | Mod: ,,, | Performed by: INTERNAL MEDICINE

## 2022-02-04 PROCEDURE — 99396 PR PREVENTIVE VISIT,EST,40-64: ICD-10-PCS | Mod: S$PBB,,, | Performed by: FAMILY MEDICINE

## 2022-02-04 PROCEDURE — 80053 COMPREHEN METABOLIC PANEL: CPT | Performed by: FAMILY MEDICINE

## 2022-02-04 NOTE — ASSESSMENT & PLAN NOTE
BP Readings from Last 6 Encounters:   02/04/22 120/86   06/29/20 126/84   10/04/19 118/80   10/02/19 (!) 142/98   03/16/19 (!) 126/90   04/11/18 118/84      Last 5 Patient Entered Readings                Current 30 Day Average:   Recent Readings 1/4/2022 1/4/2022 12/30/2021 12/20/2021 12/16/2021   SBP (mmHg) 122 126 110 111 118   DBP (mmHg) 80 86 75 79 70   Pulse 61 62 61 57 65

## 2022-02-04 NOTE — ASSESSMENT & PLAN NOTE
"Education and instructions were reviewed with him. Refer to "Patient Instructions" section of after visit summary.   "

## 2022-02-04 NOTE — PROGRESS NOTES
WELLNESS VISIT (PREVENTIVE SERVICES)  2/4/22  1:30 PM CST  LAST ENCOUNTER WITH ME:  6/29/2020   HEALTH MAINTENANCE INTERVENTIONS - UP TO DATE  Health Maintenance Topics with due status: Not Due       Topic Last Completion Date    Lipid Panel 06/29/2020    High Dose Statin 02/04/2022    Aspirin/Antiplatelet Therapy 02/04/2022       HEALTH MAINTENANCE INTERVENTIONS - DUE OR DUE SOON  Health Maintenance Due   Topic Date Due    PROSTATE-SPECIFIC ANTIGEN  Never done    Hepatitis C Screening  Never done    COVID-19 Vaccine (1) Never done    TETANUS VACCINE  Never done    Colorectal Cancer Screening  04/18/2019     COMMENT:  He is due or overdue for health maintenance interventions (colorectal cancer screening, PSA, Tdap), which I recommended. He says that the recommended services are presently cost-prohibitive for him, he gave informed refusal, but he agreed to reconsider these interventions when his circumstances changed and let me know when he is able and willing to receive these services.    CHIEF COMPLAINT: Annual Wellness Visit (Preventive Services)    DIAGNOSES SPECIFICALLY EVALUATED AND TREATED THIS ENCOUNTER  1. Preventative health care    2. Essential hypertension  - Comprehensive Metabolic Panel  - SCHEDULED EKG 12-LEAD (to Muse); Future    3. On statin therapy due to risk of future cardiovascular event  - Comprehensive Metabolic Panel    4. Coronary artery disease involving native coronary artery of native heart without angina pectoris    5. At increased risk for hospitalization and death from COVID-19 infection due to unvaccinated state AMA     No follow-ups on file.    Problem List Items Addressed This Visit     Coronary artery disease involving native coronary artery of native heart without angina pectoris (Chronic)    Essential hypertension (Chronic)    Current Assessment & Plan     BP Readings from Last 6 Encounters:   02/04/22 120/86   06/29/20 126/84   10/04/19 118/80   10/02/19 (!) 142/98  "  03/16/19 (!) 126/90   04/11/18 118/84      Last 5 Patient Entered Readings                Current 30 Day Average:   Recent Readings 1/4/2022 1/4/2022 12/30/2021 12/20/2021 12/16/2021   SBP (mmHg) 122 126 110 111 118   DBP (mmHg) 80 86 75 79 70   Pulse 61 62 61 57 65                        Relevant Orders    Comprehensive Metabolic Panel (Completed)    SCHEDULED EKG 12-LEAD (to Muse) (Completed)    At increased risk for hospitalization and death from COVID-19 infection due to unvaccinated state AMA    Current Assessment & Plan     Education and instructions were reviewed with him. Refer to "Patient Instructions" section of after visit summary.              Other Visit Diagnoses     Preventative health care    -  Primary    On statin therapy due to risk of future cardiovascular event        Relevant Orders    Comprehensive Metabolic Panel (Completed)      Unless noted herein, any chronic conditions are represented as and appear compensated/controlled and stable, and no other significant complaints or concerns were reported.    PRESCRIPTION DRUG MANAGEMENT  Outpatient Medications Prior to Visit   Medication Sig Dispense Refill    albuterol (PROVENTIL) 5 mg/mL nebulizer solution Take 2.5 mg by nebulization every 6 (six) hours as needed for Wheezing. Rescue      aspirin 81 MG Chew Take 81 mg by mouth.      atorvastatin (LIPITOR) 40 MG tablet Take 1 tablet (40 mg total) by mouth every evening. 30 tablet 1    DIPHENHYDRAMINE HCL (BENADRYL ORAL) Take by mouth.      Lactobacillus rhamnosus GG (CULTURELLE) 10 billion cell capsule Take 1 capsule by mouth once daily.      metoprolol succinate (TOPROL-XL) 25 MG 24 hr tablet Take 1 tablet (25 mg total) by mouth once daily. 90 tablet 1    valACYclovir (VALTREX) 500 MG tablet Take 1 tablet (500 mg total) by mouth once daily. (Patient not taking: Reported on 6/29/2020) 90 tablet 4     No facility-administered medications prior to visit.   There are no discontinued " "medications.     Review of Systems   Respiratory: Negative for chest tightness and shortness of breath.    Cardiovascular: Negative for chest pain.      PHYSICAL EXAM  Vitals:    02/04/22 1339   BP: 120/86   BP Location: Left arm   Patient Position: Sitting   BP Method: Medium (Manual)   Pulse: 63   Temp: 98.6 °F (37 °C)   TempSrc: Tympanic   SpO2: 97%   Weight: 87.6 kg (193 lb 2 oz)   Height: 5' 9.5" (1.765 m)   CONSTITUTIONAL: Vital signs noted. No apparent distress. Does not appear acutely ill or septic. Appears adequately hydrated.  EYE: Sclerae anicteric. Lids and conjunctiva unremarkable.  ENT: External ENT unremarkable. Hearing grossly intact.  PULM: Lungs clear. Breathing unlabored.  CV: Auscultation reveals regular rate and rhythm. Normal heart sounds. No carotid bruit.  GI: Soft and nontender. Bowel sounds normal.  DERM: Skin warm and moist with normal turgor.  NEURO: There are no gross focal motor deficits or gross deficits of cranial nerves III-XII.  PSYCH: Alert and oriented x 3. Mood is grossly neutral. Affect appropriate. Judgment and insight grossly intact.     PAST MEDICAL HISTORY  Delon has a past medical history of Acute coronary syndrome, CAD (coronary artery disease), Essential hypertension, and Hyperlipidemia.    SURGICAL HISTORY  Delon has a past surgical history that includes Appendectomy; Cardiac catheterization; and Coronary angioplasty.    FAMILY HISTORY  Delon family history includes Hyperlipidemia in his father and mother.     ALLERGIES  Review of patient's allergies indicates:   Allergen Reactions    Sulfa (sulfonamide antibiotics) Shortness Of Breath       SOCIAL HISTORY  Delon  reports that he quit smoking about 11 years ago. He quit after 0.00 years of use. He has never used smokeless tobacco. He reports that he does not drink alcohol and does not use drugs.     Documentation entered by me for this encounter may have been done in part using speech-recognition technology. Although I " "have made an effort to ensure accuracy, "sound like" errors may exist and should be interpreted in context.   "

## 2022-02-04 NOTE — PATIENT INSTRUCTIONS
The COVID-19 vaccinations are helping prevent hospitalization and death from COVID-19. The great majority of people hospitalized with COVID-19 are either unvaccinated or not fully vaccinated. I strongly recommend you get your COVID-19.    Please learn more about the COVID-19 vaccine at [email or web address]. Afterward, please let me know if I can answer any questions you may have about the vaccine. I'll be glad to help.     Please take care of yourself. You are important to me.    The quickest and easiest way to schedule an appointment for your COVID-19 vaccination is from the [employer] j[facility] e or [facility] online at [email or web address]. You can also schedule an appointment for your COVID-19 vaccination by calling [phone].      Visit https://www.healthcare.gov to learn about health insurance options.  HealthCare.gov is a health insurance exchange website operated under the [place] federal government under the provisions of the Affordable Care Act. The exchange facilitates the sale of private health insurance plans to residents of the [place] and offers subsidies to those who earn between one and four times the federal poverty line. The website also assists individuals who are eligible to sign up for Medicaid.      If you are having difficulty affording healthcare, [facility] provides financial assistance for emergency and medically necessary care to patients who are residents of [place] and who are unable to pay.     You can learn more and even download an application by visiting:  [email or web address]  There you will find a link to Billing & Financial Services - Financial Assistance    If you have questions or need help with your application, call [phone].    FYI, some of my patients who had difficulty affording healthcare have told me about getting affordable treatment at [facility]:    [address]  [place], [place] [address]  Phone: [phone]  Fax: [phone]  [email or web address]    Other patients have told me about getting affordable care at the [facility].  [email or web address]/

## 2022-02-05 LAB
ALBUMIN SERPL BCP-MCNC: 4.1 G/DL (ref 3.5–5.2)
ALP SERPL-CCNC: 67 U/L (ref 55–135)
ALT SERPL W/O P-5'-P-CCNC: 22 U/L (ref 10–44)
ANION GAP SERPL CALC-SCNC: 7 MMOL/L (ref 8–16)
AST SERPL-CCNC: 22 U/L (ref 10–40)
BILIRUB SERPL-MCNC: 0.4 MG/DL (ref 0.1–1)
BUN SERPL-MCNC: 11 MG/DL (ref 6–20)
CALCIUM SERPL-MCNC: 9.6 MG/DL (ref 8.7–10.5)
CHLORIDE SERPL-SCNC: 106 MMOL/L (ref 95–110)
CO2 SERPL-SCNC: 27 MMOL/L (ref 23–29)
CREAT SERPL-MCNC: 0.9 MG/DL (ref 0.5–1.4)
EST. GFR  (AFRICAN AMERICAN): >60 ML/MIN/1.73 M^2
EST. GFR  (NON AFRICAN AMERICAN): >60 ML/MIN/1.73 M^2
GLUCOSE SERPL-MCNC: 78 MG/DL (ref 70–110)
POTASSIUM SERPL-SCNC: 4.8 MMOL/L (ref 3.5–5.1)
PROT SERPL-MCNC: 7.1 G/DL (ref 6–8.4)
SODIUM SERPL-SCNC: 140 MMOL/L (ref 136–145)

## 2022-03-27 DIAGNOSIS — I25.10 CORONARY ARTERY DISEASE INVOLVING NATIVE CORONARY ARTERY OF NATIVE HEART WITHOUT ANGINA PECTORIS: Chronic | ICD-10-CM

## 2022-03-27 NOTE — TELEPHONE ENCOUNTER
Care Due:                  Date            Visit Type   Department     Provider  --------------------------------------------------------------------------------                                EP -                              PRIMARY      [facility] INTERNAL  Last Visit: 02-      CARE ([facility])   RHONDA Clark                              EP -                              PRIMARY      [facility] INTERNAL  Next Visit: 02-      CARE ([facility])   MEDICINE       Henrik Clark                                                            Last  Test          Frequency    Reason                     Performed    Due Date  --------------------------------------------------------------------------------    Lipid Panel.  12 months..  atorvastatin.............  06- 06-    Powered by [employer]. Reference number: [id number].   3/27/2022 4:07:22 AM CDT

## 2022-03-28 NOTE — TELEPHONE ENCOUNTER
This Rx Request does not qualify for assessment with the OR   Please review protocol details and the Care Due Message for extra clinical information    Reasons Rx Request may be deferred:  Labs/Vitals overdue  Labs/Vitals abnormal    Note composed:1:43 PM 03/28/2022

## 2022-04-06 RX ORDER — ATORVASTATIN CALCIUM 40 MG/1
TABLET, FILM COATED ORAL
Qty: 90 TABLET | Refills: 3 | Status: SHIPPED | OUTPATIENT
Start: 2022-04-06 | End: 2023-02-07 | Stop reason: SDUPTHER

## 2022-04-06 NOTE — TELEPHONE ENCOUNTER
REFILL REQUEST APPROVED.  Requested Prescriptions   Pending Prescriptions Disp Refills    atorvastatin (LIPITOR) 40 MG tablet [Pharmacy Med Name: ATORVASTATIN 40MG TABLETS] 90 tablet 3     Sig: TAKE 1 TABLET(40 MG) BY MOUTH EVERY EVENING

## 2022-07-19 ENCOUNTER — OFFICE VISIT (OUTPATIENT)
Dept: DERMATOLOGY | Facility: CLINIC | Age: 57
End: 2022-07-19

## 2022-07-19 ENCOUNTER — TELEPHONE (OUTPATIENT)
Dept: INTERNAL MEDICINE | Facility: CLINIC | Age: 57
End: 2022-07-19

## 2022-07-19 DIAGNOSIS — D48.5 NEOPLASM OF UNCERTAIN BEHAVIOR OF SKIN: Primary | ICD-10-CM

## 2022-07-19 PROCEDURE — 99999 PR PBB SHADOW E&M-EST. PATIENT-LVL III: ICD-10-PCS | Mod: PBBFAC,,, | Performed by: DERMATOLOGY

## 2022-07-19 PROCEDURE — 99499 NO LOS: ICD-10-PCS | Mod: S$PBB,,, | Performed by: DERMATOLOGY

## 2022-07-19 PROCEDURE — 88305 TISSUE EXAM BY PATHOLOGIST: ICD-10-PCS | Mod: 26,,, | Performed by: PATHOLOGY

## 2022-07-19 PROCEDURE — 99499 UNLISTED E&M SERVICE: CPT | Mod: S$PBB,,, | Performed by: DERMATOLOGY

## 2022-07-19 PROCEDURE — 11104 PUNCH BX SKIN SINGLE LESION: CPT | Mod: S$PBB,,, | Performed by: DERMATOLOGY

## 2022-07-19 PROCEDURE — 88305 TISSUE EXAM BY PATHOLOGIST: CPT | Performed by: PATHOLOGY

## 2022-07-19 PROCEDURE — 11104 PUNCH BX SKIN SINGLE LESION: CPT | Mod: PBBFAC | Performed by: DERMATOLOGY

## 2022-07-19 PROCEDURE — 99999 PR PBB SHADOW E&M-EST. PATIENT-LVL III: CPT | Mod: PBBFAC,,, | Performed by: DERMATOLOGY

## 2022-07-19 PROCEDURE — 99213 OFFICE O/P EST LOW 20 MIN: CPT | Mod: PBBFAC | Performed by: DERMATOLOGY

## 2022-07-19 PROCEDURE — 88305 TISSUE EXAM BY PATHOLOGIST: CPT | Mod: 26,,, | Performed by: PATHOLOGY

## 2022-07-19 PROCEDURE — 11104 PR PUNCH BIOPSY, SKIN, SINGLE LESION: ICD-10-PCS | Mod: S$PBB,,, | Performed by: DERMATOLOGY

## 2022-07-19 NOTE — PATIENT INSTRUCTIONS
Punch Biopsy Wound Care    Your doctor has performed a punch biopsy today.  A band aid and antibiotic ointment has been placed over the site.  This should remain in place for 24 hours.  It is recommended that you keep the area dry for the first 24 hours.  After 24 hours, you may remove the band aid and wash the area with warm soap and water and apply Vaseline jelly.  Many patients prefer to use Neosporin or Bacitracin ointment.  This is acceptable; however know that you can develop an allergy to this medication even if you have used it safely for years.  It is important to keep the area moist.  Letting it dry out and get air slows healing time, will worsen the scar, and make it more difficult to remove the stitches if they were placed.  Band aid is optional after first 24 hours.      If you notice increasing redness, tenderness, pain, or yellow drainage at the biopsy or surgical site, please notify your doctor.  These are signs of an infection.    If your biopsy/surgical site is bleeding, apply firm pressure for 15 minutes straight.  Repeat for another 15 minutes, if it is still bleeding.   If the surgical site continues to bleed, then please contact your doctor.      [facility] - [facility]   DERMATOLOGY  [address]   [place] [address]   Dept: [phone]   Dept Fax: [phone]

## 2022-07-19 NOTE — TELEPHONE ENCOUNTER
----- Message from Mariama Mejia sent at 7/19/2022 11:42 AM CDT -----  Regarding: Refill  Contact: Patient  .Type:  RX Refill Request    Who Called:  Patient  Refill or New Rx : Refill  RX Name and Strength:valACYclovir (VALTREX) 500 MG tablet   How is the patient currently taking it? (ex. 1XDay):  Is this a 30 day or 90 day RX:  Preferred Pharmacy with phone number: .    [id number] [place] - [address] AT [facility]  [address]  [place] [address]  Phone: [phone] Fax: [phone]    Local or Mail Order: Local  Ordering Provider:  Would the patient rather a call back or a response via [facility]?   Best Call Back Number:   Additional Information:

## 2022-07-19 NOTE — PROGRESS NOTES
Subjective:       Patient ID:  Delon Elder is a 57 y.o. male who presents for   Chief Complaint   Patient presents with    Cyst     Hx of EIC of the right trunk (last seen by Dr. Moreno on 10/26/18)    History of Present Illness: The patient presents with chief complaint of lesion.  Location: back  Duration: 2 years  Signs/Symptoms: n/a    Prior treatments: warm compress       Review of Systems   Constitutional: Negative for fever and chills.   Gastrointestinal: Negative for nausea and vomiting.   Skin: Positive for activity-related sunscreen use. Negative for daily sunscreen use and recent sunburn.   Hematologic/Lymphatic: Does not bruise/bleed easily.        Objective:    Physical Exam   Constitutional: He appears well-developed and well-nourished. No distress.   Neurological: He is alert and oriented to person, place, and time. He is not disoriented.   Psychiatric: He has a normal mood and affect.   Skin:   Areas Examined (abnormalities noted in diagram):   Head / Face Inspection Performed  Neck Inspection Performed  Chest / Axilla Inspection Performed  Abdomen Inspection Performed  Back Inspection Performed  RUE Inspected  LUE Inspection Performed  Nails and Digits Inspection Performed              Diagram Legend     Erythematous scaling macule/papule c/w actinic keratosis       Vascular papule c/w angioma      Pigmented verrucoid papule/plaque c/w seborrheic keratosis      Yellow umbilicated papule c/w sebaceous hyperplasia      Irregularly shaped tan macule c/w lentigo     1-2 mm smooth white papules consistent with Milia      Movable subcutaneous cyst with punctum c/w epidermal inclusion cyst      Subcutaneous movable cyst c/w pilar cyst      Firm pink to brown papule c/w dermatofibroma      Pedunculated fleshy papule(s) c/w skin tag(s)      Evenly pigmented macule c/w junctional nevus     Mildly variegated pigmented, slightly irregular-bordered macule c/w mildly atypical nevus      Flesh colored to  evenly pigmented papule c/w intradermal nevus       Pink pearly papule/plaque c/w basal cell carcinoma      Erythematous hyperkeratotic cursted plaque c/w SCC      Surgical scar with no sign of skin cancer recurrence      Open and closed comedones      Inflammatory papules and pustules      Verrucoid papule consistent consistent with wart     Erythematous eczematous patches and plaques     Dystrophic onycholytic nail with subungual debris c/w onychomycosis     Umbilicated papule    Erythematous-base heme-crusted tan verrucoid plaque consistent with inflamed seborrheic keratosis     Erythematous Silvery Scaling Plaque c/w Psoriasis     See annotation        Assessment / Plan:      Pathology Orders:     Normal Orders This Visit    Specimen to Pathology, Dermatology     Comments:    Number of Specimens:->1  ------------------------->-------------------------  Spec 1 Procedure:->Biopsy  Spec 1 Clinical Impression:->NUB  Spec 1 Source:->left upper back  Release to patient->Immediate    Questions:    Procedure Type: Dermatology and skin neoplasms    Number of Specimens: 1    ------------------------: -------------------------    Spec 1 Procedure: Biopsy    Spec 1 Clinical Impression: NUB    Spec 1 Source: left upper back    Clinical Information: see above    Release to patient: Immediate        Neoplasm of uncertain behavior of skin  -     Specimen to Pathology, Dermatology  Punch biopsy done.  RTC in 2 weeks for s/r and results.           Follow up in about 2 weeks (around 8/2/2022).     PROCEDURE NOTE -- PUNCH BIOPSY  Location: see above    After risks, benefits, and alternatives were discussed with the patient, the patient agrees to the procedure by verbal consent. The area(s) is cleansed with alcohol. A total of 3 cc of lidocaine 1% with epinephrine and sodium bicarbonate was injected for local anesthesia. A 8 mm punch biopsy was used to remove part or all of the lesion(s). The specimen was sent for tissue pathology.  The defect(s) was then closed with interrupted 4.0 Ethilon sutures. The area was dressed with antibiotic ointment and bandaged. The patient tolerated the procedure well without adverse events.  Wound care instructions were given to the patient on the AVS.  The patient will be notified of pathology results once available. Results will also be available in Epic.

## 2022-07-27 LAB
FINAL PATHOLOGIC DIAGNOSIS: NORMAL
Lab: NORMAL

## 2022-08-02 ENCOUNTER — CLINICAL SUPPORT (OUTPATIENT)
Dept: DERMATOLOGY | Facility: CLINIC | Age: 57
End: 2022-08-02

## 2022-08-02 DIAGNOSIS — Z48.02 VISIT FOR SUTURE REMOVAL: Primary | ICD-10-CM

## 2022-08-02 PROCEDURE — 99024 PR POST-OP FOLLOW-UP VISIT: ICD-10-PCS | Mod: ,,, | Performed by: DERMATOLOGY

## 2022-08-02 PROCEDURE — 99024 POSTOP FOLLOW-UP VISIT: CPT | Mod: ,,, | Performed by: DERMATOLOGY

## 2022-08-31 ENCOUNTER — PATIENT MESSAGE (OUTPATIENT)
Dept: INTERNAL MEDICINE | Facility: CLINIC | Age: 57
End: 2022-08-31

## 2022-08-31 DIAGNOSIS — A60.00 HERPES SIMPLEX INFECTION OF GENITOURINARY SYSTEM: Chronic | ICD-10-CM

## 2022-09-01 NOTE — TELEPHONE ENCOUNTER
Care Due:                  Date            Visit Type   Department     Provider  --------------------------------------------------------------------------------                                EP -                              PRIMARY      [facility]  Last Visit: 02-      CARE ([facility])   RHONDA Clark                              EP -                              PRIMARY      [facility]  Next Visit: 02-      CARE ([facility])   RHONDA Clark                                                            Last  Test          Frequency    Reason                     Performed    Due Date  --------------------------------------------------------------------------------    Lipid Panel.  12 months..  atorvastatin.............  Not Found    Overdue    [employer] Embedded Care Gaps. Reference number: [id number]. 9/01/2022   9:54:52 AM CDT

## 2022-09-02 RX ORDER — VALACYCLOVIR HYDROCHLORIDE 500 MG/1
500 TABLET, FILM COATED ORAL DAILY
Qty: 90 TABLET | Refills: 1 | Status: SHIPPED | OUTPATIENT
Start: 2022-09-02 | End: 2023-02-07 | Stop reason: DRUGHIGH

## 2022-09-02 NOTE — TELEPHONE ENCOUNTER
REFILL REQUEST APPROVED  Requested Prescriptions   Pending Prescriptions Disp Refills    valACYclovir (VALTREX) 500 MG tablet 90 tablet 4     Sig: Take 1 tablet (500 mg total) by mouth once daily.

## 2022-12-05 ENCOUNTER — TELEPHONE (OUTPATIENT)
Dept: INTERNAL MEDICINE | Facility: CLINIC | Age: 57
End: 2022-12-05

## 2022-12-05 NOTE — TELEPHONE ENCOUNTER
Spoke with patient and offered to schedule him an appointment for tomorrow for groin pain. He stated that it feels better right now and he would let us know if he needs an appointment tomorrow.

## 2022-12-07 ENCOUNTER — TELEPHONE (OUTPATIENT)
Dept: INTERNAL MEDICINE | Facility: CLINIC | Age: 57
End: 2022-12-07

## 2022-12-07 NOTE — TELEPHONE ENCOUNTER
Called and scheduled patient an appointment tomorrow  with Jose Portillo NP to evaluate groin pain. He verbalized understanding.

## 2022-12-08 ENCOUNTER — OFFICE VISIT (OUTPATIENT)
Dept: INTERNAL MEDICINE | Facility: CLINIC | Age: 57
End: 2022-12-08

## 2022-12-08 VITALS
RESPIRATION RATE: 18 BRPM | OXYGEN SATURATION: 97 % | HEART RATE: 67 BPM | SYSTOLIC BLOOD PRESSURE: 120 MMHG | WEIGHT: 196 LBS | DIASTOLIC BLOOD PRESSURE: 88 MMHG | BODY MASS INDEX: 28.06 KG/M2 | HEIGHT: 70 IN | TEMPERATURE: 96 F

## 2022-12-08 DIAGNOSIS — Z12.11 ENCOUNTER FOR SCREENING FOR COLORECTAL MALIGNANT NEOPLASM IN HIGH RISK PATIENT: ICD-10-CM

## 2022-12-08 DIAGNOSIS — R10.32 LLQ ABDOMINAL PAIN: Primary | ICD-10-CM

## 2022-12-08 DIAGNOSIS — Z12.12 ENCOUNTER FOR SCREENING FOR COLORECTAL MALIGNANT NEOPLASM IN HIGH RISK PATIENT: ICD-10-CM

## 2022-12-08 DIAGNOSIS — I10 ESSENTIAL HYPERTENSION: Chronic | ICD-10-CM

## 2022-12-08 DIAGNOSIS — K21.9 GASTROESOPHAGEAL REFLUX DISEASE, UNSPECIFIED WHETHER ESOPHAGITIS PRESENT: ICD-10-CM

## 2022-12-08 DIAGNOSIS — Z91.89 ENCOUNTER FOR SCREENING FOR COLORECTAL MALIGNANT NEOPLASM IN HIGH RISK PATIENT: ICD-10-CM

## 2022-12-08 PROCEDURE — 99999 PR PBB SHADOW E&M-EST. PATIENT-LVL V: ICD-10-PCS | Mod: PBBFAC,,, | Performed by: NURSE PRACTITIONER

## 2022-12-08 PROCEDURE — 99999 PR PBB SHADOW E&M-EST. PATIENT-LVL V: CPT | Mod: PBBFAC,,, | Performed by: NURSE PRACTITIONER

## 2022-12-08 PROCEDURE — 99214 PR OFFICE/OUTPT VISIT, EST, LEVL IV, 30-39 MIN: ICD-10-PCS | Mod: S$PBB,,, | Performed by: NURSE PRACTITIONER

## 2022-12-08 PROCEDURE — 99215 OFFICE O/P EST HI 40 MIN: CPT | Mod: PBBFAC | Performed by: NURSE PRACTITIONER

## 2022-12-08 PROCEDURE — 99214 OFFICE O/P EST MOD 30 MIN: CPT | Mod: S$PBB,,, | Performed by: NURSE PRACTITIONER

## 2022-12-08 RX ORDER — OMEPRAZOLE 20 MG/1
20 CAPSULE, DELAYED RELEASE ORAL DAILY
Qty: 90 CAPSULE | Refills: 1 | Status: SHIPPED | OUTPATIENT
Start: 2022-12-08 | End: 2023-02-07 | Stop reason: SDUPTHER

## 2022-12-08 NOTE — PROGRESS NOTES
Chief Complaint  Chief Complaint   Patient presents with    Groin Pain         HPI     HPI  Delon Elder is a 57 y.o. male with medical diagnoses as listed in the medical history and problem list that presents for LLQ Abdominal Pain.  This patient is new to me.    LLQ Abdominal Pain: A week ago he ate [employer] for two consecutive days that started the reaction of lower abdominal pain. He then ate foods high in spice days following. He rates LLQ pain 4/10. He was experiencing constipation at the same time. Complains  of increased LLQ abdominal pain during iliopsoas maneuver in clinic today. He took one of his mother's stool softeners and started passing softer stools. Denies hematuria, hematochezia or melena. Denies N/V. He also reports epigastric burning likely due to GERD when he eats spicy-greasy foods.       History     PAST MEDICAL HISTORY:  Past Medical History:   Diagnosis Date    Acute coronary syndrome     CAD (coronary artery disease) 2015    Essential hypertension     Hyperlipidemia        PAST SURGICAL HISTORY:  Past Surgical History:   Procedure Laterality Date    APPENDECTOMY      CARDIAC CATHETERIZATION      CORONARY ANGIOPLASTY         SOCIAL HISTORY:  Social History     Socioeconomic History    Marital status: Single   Occupational History    Occupation: electronic tech     Employer: OTHER     Comment: [employer]   Tobacco Use    Smoking status: Former     Years: 0.00     Types: Cigarettes     Quit date: 2010     Years since quittin.6    Smokeless tobacco: Never   Substance and Sexual Activity    Alcohol use: No    Drug use: No   Social History Narrative    1 dog; No smokers in the household        FAMILY HISTORY:  Family History   Problem Relation Age of Onset    Hyperlipidemia Mother     Hyperlipidemia Father     Melanoma Neg Hx     Psoriasis Neg Hx     Lupus Neg Hx        ALLERGIES AND MEDICATIONS: updated and reviewed.  Review of patient's allergies indicates:   Allergen Reactions  "   Sulfa (sulfonamide antibiotics) Shortness Of Breath     Current Outpatient Medications   Medication Sig Dispense Refill    aspirin 81 MG Chew Take 81 mg by mouth.      atorvastatin (LIPITOR) 40 MG tablet TAKE 1 TABLET(40 MG) BY MOUTH EVERY EVENING 90 tablet 3    DIPHENHYDRAMINE HCL (BENADRYL ORAL) Take by mouth.      Lactobacillus rhamnosus GG (CULTURELLE) 10 billion cell capsule Take 1 capsule by mouth once daily.      metoprolol succinate (TOPROL-XL) 25 MG 24 hr tablet TAKE 1 TABLET(25 MG) BY MOUTH EVERY DAY 90 tablet 1    valACYclovir (VALTREX) 500 MG tablet Take 1 tablet (500 mg total) by mouth once daily. 90 tablet 1    albuterol (PROVENTIL) 5 mg/mL nebulizer solution Take 2.5 mg by nebulization every 6 (six) hours as needed for Wheezing. Rescue      omeprazole (PRILOSEC) 20 MG capsule Take 1 capsule (20 mg total) by mouth once daily. 90 capsule 1     No current facility-administered medications for this visit.           Exam     ROS  Review of Systems   Constitutional:  Negative for appetite change, chills, fatigue and fever.   HENT:  Negative for congestion, ear pain, postnasal drip, rhinorrhea, sneezing, sore throat and tinnitus.    Respiratory:  Negative for cough and shortness of breath.    Cardiovascular:  Negative for chest pain and palpitations.   Gastrointestinal:  Positive for abdominal pain and constipation. Negative for diarrhea, nausea, rectal pain and vomiting.   Genitourinary:  Negative for difficulty urinating and dysuria.   Musculoskeletal:  Negative for arthralgias.   Neurological:  Negative for headaches.   Psychiatric/Behavioral:  Negative for sleep disturbance.          Physical Exam  Vitals:    12/08/22 1031   BP: 120/88   BP Location: Right arm   Patient Position: Sitting   BP Method: Large (Manual)   Pulse: 67   Resp: 18   Temp: 96 °F (35.6 °C)   TempSrc: Tympanic   SpO2: 97%   Weight: 88.9 kg (195 lb 15.8 oz)   Height: 5' 9.5" (1.765 m)    Body mass index is 28.53 kg/m².  Weight: " "88.9 kg (195 lb 15.8 oz)   Height: 5' 9.5" (176.5 cm)   Physical Exam  Constitutional:       General: He is not in acute distress.     Appearance: Normal appearance. He is well-developed.   HENT:      Head: Normocephalic and atraumatic.      Right Ear: External ear normal.      Left Ear: External ear normal.      Nose: Nose normal.      Mouth/Throat:      Pharynx: No oropharyngeal exudate.   Eyes:      Pupils: Pupils are equal, round, and reactive to light.   Cardiovascular:      Rate and Rhythm: Normal rate and regular rhythm.      Heart sounds: No murmur heard.    No friction rub. No gallop.   Pulmonary:      Effort: Pulmonary effort is normal. No respiratory distress.      Breath sounds: Normal breath sounds. No wheezing.   Abdominal:      General: Bowel sounds are normal.      Palpations: Abdomen is soft.      Tenderness: There is abdominal tenderness.   Musculoskeletal:      Cervical back: Neck supple.   Lymphadenopathy:      Cervical: No cervical adenopathy.   Skin:     General: Skin is warm and dry.   Neurological:      Mental Status: He is alert and oriented to person, place, and time.   Psychiatric:         Mood and Affect: Mood normal.           Health Maintenance         Date Due Completion Date    PROSTATE-SPECIFIC ANTIGEN Never done ---    Hepatitis C Screening Never done ---    COVID-19 Vaccine (1) Never done ---    TETANUS VACCINE Never done ---    Colorectal Cancer Screening 04/18/2019 4/18/2018    High Dose Statin 12/08/2023 12/8/2022    Lipid Panel 06/29/2025 6/29/2020              Assessment & Plan     Assessment & Plan  Problem List Items Addressed This Visit          Cardiac/Vascular    Essential hypertension (Chronic)  -The current medical regimen is effective;  continue present plan and medications.      Other Visit Diagnoses       LLQ abdominal pain    -  Primary  - Imaging and labs to screen for causes of LLQ abdominal pain    Relevant Orders    CT Abdomen Pelvis With Contrast    Urinalysis "    CBC Auto Differential    Comprehensive Metabolic Panel    Gastroesophageal reflux disease, unspecified whether esophagitis present      -We discussed effective administration of Prilosec, adverse effects and side effects with education given for reinforcement    Relevant Medications    omeprazole (PRILOSEC) 20 MG capsule    Encounter for screening for colorectal malignant neoplasm in high risk patient        Relevant Orders    Case Request Endoscopy: COLONOSCOPY (Completed)              Health Maintenance reviewed: Deferred per patient    Follow-up: 2 months with PCP    30+ minutes of total time spent on the encounter, which includes face to face time and non-face to face time preparing to see the patient (eg, review of tests), Obtaining and/or reviewing separately obtained history, documenting clinical information in the electronic or other health record, independently interpreting results (not separately reported) and communicating results to the patient/family/caregiver, or Care coordination (not separately reported).

## 2023-02-03 DIAGNOSIS — Z13.9 SCREENING PROCEDURE: Primary | ICD-10-CM

## 2023-02-07 ENCOUNTER — OFFICE VISIT (OUTPATIENT)
Dept: INTERNAL MEDICINE | Facility: CLINIC | Age: 58
End: 2023-02-07
Payer: COMMERCIAL

## 2023-02-07 VITALS
SYSTOLIC BLOOD PRESSURE: 126 MMHG | HEART RATE: 64 BPM | BODY MASS INDEX: 27.94 KG/M2 | HEIGHT: 70 IN | RESPIRATION RATE: 16 BRPM | DIASTOLIC BLOOD PRESSURE: 82 MMHG | WEIGHT: 195.13 LBS | OXYGEN SATURATION: 99 % | TEMPERATURE: 98 F

## 2023-02-07 DIAGNOSIS — Z12.2 ENCOUNTER FOR SCREENING FOR LUNG CANCER: ICD-10-CM

## 2023-02-07 DIAGNOSIS — K21.9 GASTROESOPHAGEAL REFLUX DISEASE WITHOUT ESOPHAGITIS: ICD-10-CM

## 2023-02-07 DIAGNOSIS — J41.0 SIMPLE CHRONIC BRONCHITIS: Chronic | ICD-10-CM

## 2023-02-07 DIAGNOSIS — Z12.11 SCREENING FOR COLORECTAL CANCER: ICD-10-CM

## 2023-02-07 DIAGNOSIS — Z87.891 FORMER SMOKER: ICD-10-CM

## 2023-02-07 DIAGNOSIS — Z12.12 SCREENING FOR COLORECTAL CANCER: ICD-10-CM

## 2023-02-07 DIAGNOSIS — I25.10 CORONARY ARTERY DISEASE INVOLVING NATIVE CORONARY ARTERY OF NATIVE HEART WITHOUT ANGINA PECTORIS: Chronic | ICD-10-CM

## 2023-02-07 DIAGNOSIS — Z12.5 SCREENING PSA (PROSTATE SPECIFIC ANTIGEN): ICD-10-CM

## 2023-02-07 DIAGNOSIS — A60.00 HERPES SIMPLEX INFECTION OF GENITOURINARY SYSTEM: Chronic | ICD-10-CM

## 2023-02-07 DIAGNOSIS — Z13.1 SCREENING FOR DIABETES MELLITUS: ICD-10-CM

## 2023-02-07 DIAGNOSIS — I10 ESSENTIAL HYPERTENSION: ICD-10-CM

## 2023-02-07 DIAGNOSIS — Z00.00 PREVENTATIVE HEALTH CARE: Primary | ICD-10-CM

## 2023-02-07 DIAGNOSIS — Z11.59 ENCOUNTER FOR HEPATITIS C SCREENING TEST FOR LOW RISK PATIENT: ICD-10-CM

## 2023-02-07 PROBLEM — S83.242A TEAR OF MEDIAL MENISCUS OF LEFT KNEE, CURRENT: Status: RESOLVED | Noted: 2018-02-27 | Resolved: 2023-02-07

## 2023-02-07 PROCEDURE — 1159F MED LIST DOCD IN RCRD: CPT | Mod: CPTII,S$GLB,, | Performed by: FAMILY MEDICINE

## 2023-02-07 PROCEDURE — 99999 PR PBB SHADOW E&M-EST. PATIENT-LVL IV: ICD-10-PCS | Mod: PBBFAC,,, | Performed by: FAMILY MEDICINE

## 2023-02-07 PROCEDURE — 3079F PR MOST RECENT DIASTOLIC BLOOD PRESSURE 80-89 MM HG: ICD-10-PCS | Mod: CPTII,S$GLB,, | Performed by: FAMILY MEDICINE

## 2023-02-07 PROCEDURE — 3008F BODY MASS INDEX DOCD: CPT | Mod: CPTII,S$GLB,, | Performed by: FAMILY MEDICINE

## 2023-02-07 PROCEDURE — 3079F DIAST BP 80-89 MM HG: CPT | Mod: CPTII,S$GLB,, | Performed by: FAMILY MEDICINE

## 2023-02-07 PROCEDURE — 84153 ASSAY OF PSA TOTAL: CPT | Performed by: FAMILY MEDICINE

## 2023-02-07 PROCEDURE — 86803 HEPATITIS C AB TEST: CPT | Performed by: FAMILY MEDICINE

## 2023-02-07 PROCEDURE — 1160F RVW MEDS BY RX/DR IN RCRD: CPT | Mod: CPTII,S$GLB,, | Performed by: FAMILY MEDICINE

## 2023-02-07 PROCEDURE — 3074F PR MOST RECENT SYSTOLIC BLOOD PRESSURE < 130 MM HG: ICD-10-PCS | Mod: CPTII,S$GLB,, | Performed by: FAMILY MEDICINE

## 2023-02-07 PROCEDURE — 99396 PR PREVENTIVE VISIT,EST,40-64: ICD-10-PCS | Mod: S$GLB,,, | Performed by: FAMILY MEDICINE

## 2023-02-07 PROCEDURE — 83036 HEMOGLOBIN GLYCOSYLATED A1C: CPT | Performed by: FAMILY MEDICINE

## 2023-02-07 PROCEDURE — 1159F PR MEDICATION LIST DOCUMENTED IN MEDICAL RECORD: ICD-10-PCS | Mod: CPTII,S$GLB,, | Performed by: FAMILY MEDICINE

## 2023-02-07 PROCEDURE — 99396 PREV VISIT EST AGE 40-64: CPT | Mod: S$GLB,,, | Performed by: FAMILY MEDICINE

## 2023-02-07 PROCEDURE — 99999 PR PBB SHADOW E&M-EST. PATIENT-LVL IV: CPT | Mod: PBBFAC,,, | Performed by: FAMILY MEDICINE

## 2023-02-07 PROCEDURE — 3008F PR BODY MASS INDEX (BMI) DOCUMENTED: ICD-10-PCS | Mod: CPTII,S$GLB,, | Performed by: FAMILY MEDICINE

## 2023-02-07 PROCEDURE — 3074F SYST BP LT 130 MM HG: CPT | Mod: CPTII,S$GLB,, | Performed by: FAMILY MEDICINE

## 2023-02-07 PROCEDURE — 80053 COMPREHEN METABOLIC PANEL: CPT | Performed by: FAMILY MEDICINE

## 2023-02-07 PROCEDURE — 1160F PR REVIEW ALL MEDS BY PRESCRIBER/CLIN PHARMACIST DOCUMENTED: ICD-10-PCS | Mod: CPTII,S$GLB,, | Performed by: FAMILY MEDICINE

## 2023-02-07 PROCEDURE — 80061 LIPID PANEL: CPT | Performed by: FAMILY MEDICINE

## 2023-02-07 RX ORDER — ATORVASTATIN CALCIUM 40 MG/1
40 TABLET, FILM COATED ORAL NIGHTLY
Qty: 90 TABLET | Refills: 3 | Status: SHIPPED | OUTPATIENT
Start: 2023-02-07 | End: 2023-03-22 | Stop reason: SDUPTHER

## 2023-02-07 RX ORDER — METOPROLOL SUCCINATE 25 MG/1
25 TABLET, EXTENDED RELEASE ORAL NIGHTLY
Qty: 90 TABLET | Refills: 3 | Status: SHIPPED | OUTPATIENT
Start: 2023-02-07 | End: 2023-06-16 | Stop reason: SDUPTHER

## 2023-02-07 RX ORDER — ALBUTEROL SULFATE 90 UG/1
2 AEROSOL, METERED RESPIRATORY (INHALATION) EVERY 6 HOURS PRN
Qty: 18 G | Refills: 11 | Status: SHIPPED | OUTPATIENT
Start: 2023-02-07 | End: 2024-02-23 | Stop reason: SDUPTHER

## 2023-02-07 RX ORDER — OMEPRAZOLE 20 MG/1
20 CAPSULE, DELAYED RELEASE ORAL DAILY
Qty: 90 CAPSULE | Refills: 3 | Status: SHIPPED | OUTPATIENT
Start: 2023-02-07 | End: 2023-06-16 | Stop reason: SDUPTHER

## 2023-02-07 RX ORDER — VALACYCLOVIR HYDROCHLORIDE 1 G/1
1000 TABLET, FILM COATED ORAL DAILY
Qty: 5 TABLET | Refills: 11 | Status: SHIPPED | OUTPATIENT
Start: 2023-02-07 | End: 2024-02-23 | Stop reason: SDUPTHER

## 2023-02-07 NOTE — PROGRESS NOTES
ANNUAL WELLNESS VISIT (PREVENTIVE SERVICES)  OFFICE VISIT 2/7/23  2:00 PM CST       CHIEF COMPLAINT: Annual Exam    INTERVAL HISTORY -: Delon reports that his chronic conditions are stable, and he reports no new complaints or concerns. He says he is doing well and wants to continue current treatment.   Review of Systems   Respiratory:  Negative for chest tightness and shortness of breath.    Cardiovascular:  Negative for chest pain.   No other significant complaints or concerns were reported.    Assessment & Plan   ASSESSMENT & PLAN  1. Preventative health care  -     Hemoglobin A1C; Future; Expected date: 02/07/2023  -     Comprehensive Metabolic Panel; Future; Expected date: 02/07/2023  -     Lipid Panel; Future; Expected date: 02/07/2023  -     PSA, Screening; Future; Expected date: 02/07/2023  -     Ambulatory referral/consult to Endo Procedure ; Future; Expected date: 02/08/2023  -     Hepatitis C Antibody; Future; Expected date: 02/07/2023    2. Former smoker  Overview:  Quit in 2010 after 45 pack-year smoking history.    Orders:  -     CT Chest Lung Screening Low Dose; Future; Expected date: 02/07/2023    3. Gastroesophageal reflux disease without esophagitis  -     omeprazole (PRILOSEC) 20 MG capsule; Take 1 capsule (20 mg total) by mouth once daily. Take with foot.  Dispense: 90 capsule; Refill: 3    4. Coronary artery disease involving native coronary artery of native heart without angina pectoris  -     Comprehensive Metabolic Panel; Future; Expected date: 02/07/2023  -     Lipid Panel; Future; Expected date: 02/07/2023  -     atorvastatin (LIPITOR) 40 MG tablet; Take 1 tablet (40 mg total) by mouth every evening.  Dispense: 90 tablet; Refill: 3    5. Essential hypertension  -     Comprehensive Metabolic Panel; Future; Expected date: 02/07/2023  -     metoprolol succinate (TOPROL-XL) 25 MG 24 hr tablet; Take 1 tablet (25 mg total) by mouth every evening.  Dispense: 90 tablet; Refill: 3    6.  "Herpes simplex infection of genitourinary system  Assessment & Plan:  Last outbreak about 1 year ago.    Orders:  -     valACYclovir (VALTREX) 1000 MG tablet; Take 1 tablet (1,000 mg total) by mouth once daily. Start ASAP at onset of herpes symptoms. FIVE DOSES = FULL COURSE.  Dispense: 5 tablet; Refill: 11    7. Simple chronic bronchitis  Assessment & Plan:  He describes daily productive cough on majority of days for > last 2 years, consistent with chronic obstructive bronchitis, which is consistent with his smoking history. He reports no hemoptysis or chest pain. He says he rarely needs/uses albuterol MDI.    Orders:  -     albuterol (PROVENTIL HFA) 90 mcg/actuation inhaler; Inhale 2 puffs into the lungs every 6 (six) hours as needed for Wheezing. (Rescue inhaler)  Dispense: 18 g; Refill: 11    8. Screening for diabetes mellitus  -     Hemoglobin A1C; Future; Expected date: 02/07/2023    9. Screening PSA (prostate specific antigen)  -     PSA, Screening; Future; Expected date: 02/07/2023    10. Screening for colorectal cancer  -     Ambulatory referral/consult to Endo Procedure ; Future; Expected date: 02/08/2023    11. Encounter for hepatitis C screening test for low risk patient  -     Hepatitis C Antibody; Future; Expected date: 02/07/2023    12. Encounter for screening for lung cancer  -     CT Chest Lung Screening Low Dose; Future; Expected date: 02/07/2023    Unless noted herein, any chronic conditions are represented as and appear compensated/controlled and stable, and no other significant complaints or concerns were reported.    Follow up in about 1 year (around 2/7/2024) for annual wellness exam.        PHYSICAL EXAM  Vitals:    02/07/23 1357   BP: 126/82   BP Location: Left arm   Patient Position: Sitting   BP Method: Large (Manual)   Pulse: 64   Resp: 16   Temp: 97.8 °F (36.6 °C)   TempSrc: Tympanic   SpO2: 99%   Weight: 88.5 kg (195 lb 1.7 oz)   Height: 5' 9.5" (1.765 m)   Physical " "Exam  Vitals reviewed.   Constitutional:       General: He is not in acute distress.     Appearance: Normal appearance. He is not ill-appearing, toxic-appearing or diaphoretic.   HENT:      Head: Normocephalic and atraumatic.   Eyes:      General: No scleral icterus.     Conjunctiva/sclera: Conjunctivae normal.   Neck:      Vascular: No carotid bruit.   Cardiovascular:      Rate and Rhythm: Normal rate and regular rhythm.   Pulmonary:      Effort: Pulmonary effort is normal.      Breath sounds: Normal breath sounds.   Abdominal:      Palpations: Abdomen is soft.      Tenderness: There is no abdominal tenderness.   Lymphadenopathy:      Cervical: No cervical adenopathy.   Skin:     General: Skin is warm and dry.   Neurological:      General: No focal deficit present.      Mental Status: He is alert and oriented to person, place, and time. Mental status is at baseline.   Psychiatric:         Mood and Affect: Mood normal.         Behavior: Behavior normal.         Judgment: Judgment normal.        Documentation entered by me for this encounter may have been done in part using speech-recognition technology. Although I have made an effort to ensure accuracy, "sound like" errors may exist and should be interpreted in context.   "

## 2023-02-07 NOTE — ASSESSMENT & PLAN NOTE
He describes daily productive cough on majority of days for > last 2 years, consistent with chronic obstructive bronchitis, which is consistent with his smoking history. He reports no hemoptysis or chest pain. He says he rarely needs/uses albuterol MDI.

## 2023-02-08 LAB
ALBUMIN SERPL BCP-MCNC: 4.2 G/DL (ref 3.5–5.2)
ALP SERPL-CCNC: 69 U/L (ref 55–135)
ALT SERPL W/O P-5'-P-CCNC: 20 U/L (ref 10–44)
ANION GAP SERPL CALC-SCNC: 11 MMOL/L (ref 8–16)
AST SERPL-CCNC: 19 U/L (ref 10–40)
BILIRUB SERPL-MCNC: 0.6 MG/DL (ref 0.1–1)
BUN SERPL-MCNC: 13 MG/DL (ref 6–20)
CALCIUM SERPL-MCNC: 8.9 MG/DL (ref 8.7–10.5)
CHLORIDE SERPL-SCNC: 106 MMOL/L (ref 95–110)
CHOLEST SERPL-MCNC: 173 MG/DL (ref 120–199)
CHOLEST/HDLC SERPL: 4.4 {RATIO} (ref 2–5)
CO2 SERPL-SCNC: 21 MMOL/L (ref 23–29)
COMPLEXED PSA SERPL-MCNC: 0.53 NG/ML (ref 0–4)
CREAT SERPL-MCNC: 0.8 MG/DL (ref 0.5–1.4)
EST. GFR  (NO RACE VARIABLE): >60 ML/MIN/1.73 M^2
ESTIMATED AVG GLUCOSE: 120 MG/DL (ref 68–131)
GLUCOSE SERPL-MCNC: 80 MG/DL (ref 70–110)
HBA1C MFR BLD: 5.8 % (ref 4–5.6)
HCV AB SERPL QL IA: NORMAL
HDLC SERPL-MCNC: 39 MG/DL (ref 40–75)
HDLC SERPL: 22.5 % (ref 20–50)
LDLC SERPL CALC-MCNC: 111.2 MG/DL (ref 63–159)
NONHDLC SERPL-MCNC: 134 MG/DL
POTASSIUM SERPL-SCNC: 4.2 MMOL/L (ref 3.5–5.1)
PROT SERPL-MCNC: 7 G/DL (ref 6–8.4)
SODIUM SERPL-SCNC: 138 MMOL/L (ref 136–145)
TRIGL SERPL-MCNC: 114 MG/DL (ref 30–150)

## 2023-02-17 ENCOUNTER — HOSPITAL ENCOUNTER (OUTPATIENT)
Dept: RADIOLOGY | Facility: HOSPITAL | Age: 58
Discharge: HOME | End: 2023-02-17
Attending: FAMILY MEDICINE
Payer: COMMERCIAL

## 2023-02-17 ENCOUNTER — OFFICE VISIT (OUTPATIENT)
Dept: INTERNAL MEDICINE | Facility: CLINIC | Age: 58
End: 2023-02-17
Payer: COMMERCIAL

## 2023-02-17 VITALS
SYSTOLIC BLOOD PRESSURE: 120 MMHG | HEART RATE: 54 BPM | BODY MASS INDEX: 28 KG/M2 | WEIGHT: 195.56 LBS | HEIGHT: 70 IN | DIASTOLIC BLOOD PRESSURE: 84 MMHG | RESPIRATION RATE: 16 BRPM | OXYGEN SATURATION: 99 % | TEMPERATURE: 97 F

## 2023-02-17 DIAGNOSIS — I10 ESSENTIAL HYPERTENSION: Chronic | ICD-10-CM

## 2023-02-17 DIAGNOSIS — I25.10 CORONARY ARTERY DISEASE INVOLVING NATIVE CORONARY ARTERY OF NATIVE HEART WITHOUT ANGINA PECTORIS: ICD-10-CM

## 2023-02-17 DIAGNOSIS — R73.03 PREDIABETES: Primary | ICD-10-CM

## 2023-02-17 DIAGNOSIS — Z87.891 FORMER SMOKER: ICD-10-CM

## 2023-02-17 DIAGNOSIS — Z12.2 ENCOUNTER FOR SCREENING FOR LUNG CANCER: ICD-10-CM

## 2023-02-17 DIAGNOSIS — K21.9 GASTROESOPHAGEAL REFLUX DISEASE WITHOUT ESOPHAGITIS: ICD-10-CM

## 2023-02-17 PROCEDURE — 3074F SYST BP LT 130 MM HG: CPT | Mod: CPTII,S$GLB,, | Performed by: NURSE PRACTITIONER

## 2023-02-17 PROCEDURE — 71271 CT THORAX LUNG CANCER SCR C-: CPT | Mod: 26,,, | Performed by: RADIOLOGY

## 2023-02-17 PROCEDURE — 99999 PR PBB SHADOW E&M-EST. PATIENT-LVL IV: CPT | Mod: PBBFAC,,, | Performed by: NURSE PRACTITIONER

## 2023-02-17 PROCEDURE — 99214 OFFICE O/P EST MOD 30 MIN: CPT | Mod: S$GLB,,, | Performed by: NURSE PRACTITIONER

## 2023-02-17 PROCEDURE — 3044F PR MOST RECENT HEMOGLOBIN A1C LEVEL <7.0%: ICD-10-PCS | Mod: CPTII,S$GLB,, | Performed by: NURSE PRACTITIONER

## 2023-02-17 PROCEDURE — 71271 CT THORAX LUNG CANCER SCR C-: CPT | Mod: TC

## 2023-02-17 PROCEDURE — 3044F HG A1C LEVEL LT 7.0%: CPT | Mod: CPTII,S$GLB,, | Performed by: NURSE PRACTITIONER

## 2023-02-17 PROCEDURE — 1159F PR MEDICATION LIST DOCUMENTED IN MEDICAL RECORD: ICD-10-PCS | Mod: CPTII,S$GLB,, | Performed by: NURSE PRACTITIONER

## 2023-02-17 PROCEDURE — 1159F MED LIST DOCD IN RCRD: CPT | Mod: CPTII,S$GLB,, | Performed by: NURSE PRACTITIONER

## 2023-02-17 PROCEDURE — 3079F PR MOST RECENT DIASTOLIC BLOOD PRESSURE 80-89 MM HG: ICD-10-PCS | Mod: CPTII,S$GLB,, | Performed by: NURSE PRACTITIONER

## 2023-02-17 PROCEDURE — 71271 CT CHEST LUNG SCREENING LOW DOSE: ICD-10-PCS | Mod: 26,,, | Performed by: RADIOLOGY

## 2023-02-17 PROCEDURE — 99999 PR PBB SHADOW E&M-EST. PATIENT-LVL IV: ICD-10-PCS | Mod: PBBFAC,,, | Performed by: NURSE PRACTITIONER

## 2023-02-17 PROCEDURE — 3008F BODY MASS INDEX DOCD: CPT | Mod: CPTII,S$GLB,, | Performed by: NURSE PRACTITIONER

## 2023-02-17 PROCEDURE — 3008F PR BODY MASS INDEX (BMI) DOCUMENTED: ICD-10-PCS | Mod: CPTII,S$GLB,, | Performed by: NURSE PRACTITIONER

## 2023-02-17 PROCEDURE — 99214 PR OFFICE/OUTPT VISIT, EST, LEVL IV, 30-39 MIN: ICD-10-PCS | Mod: S$GLB,,, | Performed by: NURSE PRACTITIONER

## 2023-02-17 PROCEDURE — 1160F RVW MEDS BY RX/DR IN RCRD: CPT | Mod: CPTII,S$GLB,, | Performed by: NURSE PRACTITIONER

## 2023-02-17 PROCEDURE — 1160F PR REVIEW ALL MEDS BY PRESCRIBER/CLIN PHARMACIST DOCUMENTED: ICD-10-PCS | Mod: CPTII,S$GLB,, | Performed by: NURSE PRACTITIONER

## 2023-02-17 PROCEDURE — 3079F DIAST BP 80-89 MM HG: CPT | Mod: CPTII,S$GLB,, | Performed by: NURSE PRACTITIONER

## 2023-02-17 PROCEDURE — 3074F PR MOST RECENT SYSTOLIC BLOOD PRESSURE < 130 MM HG: ICD-10-PCS | Mod: CPTII,S$GLB,, | Performed by: NURSE PRACTITIONER

## 2023-02-17 NOTE — PROGRESS NOTES
HPI     Chief Complaint  Chief Complaint   Patient presents with    Results       HPI  Delon Elder is a 57 y.o. male with multiple medical diagnoses as listed in the medical history and problem list that presents for Lab Follow- Up.  Pt is known to me with his last appointment 2023.      Lab Follow- Up: A1C is 5.8. Reports a diet  high in junk food and sodas. He eats tv dinners often. Blood pressure looks great. Compliant with Lipitor and hypertensive medications.     History     PAST MEDICAL HISTORY:  Past Medical History:   Diagnosis Date    Acute coronary syndrome     CAD (coronary artery disease) 2015    Essential hypertension     Hyperlipidemia     Simple chronic bronchitis 2015    Tear of medial meniscus of left knee, current 2018       PAST SURGICAL HISTORY:  Past Surgical History:   Procedure Laterality Date    APPENDECTOMY      CARDIAC CATHETERIZATION      CORONARY ANGIOPLASTY         SOCIAL HISTORY:  Social History     Socioeconomic History    Marital status: Single   Occupational History    Occupation: electronic tech     Employer: OTHER     Comment: [employer]   Tobacco Use    Smoking status: Former     Packs/day: 1.50     Years: 30.00     Pack years: 45.00     Types: Cigarettes     Quit date: 2010     Years since quittin.8    Smokeless tobacco: Never   Substance and Sexual Activity    Alcohol use: No    Drug use: No   Social History Narrative    1 dog; No smokers in the household        FAMILY HISTORY:  Family History   Problem Relation Age of Onset    Hyperlipidemia Mother     Hyperlipidemia Father     Melanoma Neg Hx     Psoriasis Neg Hx     Lupus Neg Hx        ALLERGIES AND MEDICATIONS: updated and reviewed.  Review of patient's allergies indicates:   Allergen Reactions    Sulfa (sulfonamide antibiotics) Shortness Of Breath     Current Outpatient Medications   Medication Sig Dispense Refill    albuterol (PROVENTIL HFA) 90 mcg/actuation inhaler Inhale 2 puffs into the  "lungs every 6 (six) hours as needed for Wheezing. (Rescue inhaler) 18 g 11    aspirin 81 MG Chew Take 81 mg by mouth.      atorvastatin (LIPITOR) 40 MG tablet Take 1 tablet (40 mg total) by mouth every evening. 90 tablet 3    DIPHENHYDRAMINE HCL (BENADRYL ORAL) Take by mouth.      Lactobacillus rhamnosus GG (CULTURELLE) 10 billion cell capsule Take 1 capsule by mouth once daily.      metoprolol succinate (TOPROL-XL) 25 MG 24 hr tablet Take 1 tablet (25 mg total) by mouth every evening. 90 tablet 3    omeprazole (PRILOSEC) 20 MG capsule Take 1 capsule (20 mg total) by mouth once daily. Take with foot. 90 capsule 3    valACYclovir (VALTREX) 1000 MG tablet Take 1 tablet (1,000 mg total) by mouth once daily. Start ASAP at onset of herpes symptoms. FIVE DOSES = FULL COURSE. 5 tablet 11     No current facility-administered medications for this visit.       Exam     ROS  Review of Systems   Constitutional:  Negative for appetite change, chills, fatigue and fever.   HENT:  Negative for congestion, ear pain, postnasal drip, rhinorrhea, sinus pressure, sneezing, sore throat and tinnitus.    Respiratory:  Negative for cough and shortness of breath.    Cardiovascular:  Negative for chest pain and palpitations.   Gastrointestinal:  Negative for abdominal pain, constipation, diarrhea, nausea and vomiting.   Genitourinary:  Negative for difficulty urinating and dysuria.   Musculoskeletal:  Negative for arthralgias.   Neurological:  Negative for headaches.   Psychiatric/Behavioral:  Negative for sleep disturbance.          Physical Exam  Vitals:    02/17/23 1029   BP: 120/84   BP Location: Right arm   Patient Position: Sitting   BP Method: Large (Manual)   Pulse: (!) 54   Resp: 16   Temp: 96.6 °F (35.9 °C)   TempSrc: Tympanic   SpO2: 99%   Weight: 88.7 kg (195 lb 8.8 oz)   Height: 5' 9.5" (1.765 m)    Body mass index is 28.46 kg/m².  Weight: 88.7 kg (195 lb 8.8 oz)   Height: 5' 9.5" (176.5 cm)   Physical Exam  Constitutional:       " General: He is not in acute distress.     Appearance: Normal appearance. He is well-developed.   HENT:      Head: Normocephalic and atraumatic.      Right Ear: External ear normal.      Left Ear: External ear normal.      Nose: Nose normal.      Mouth/Throat:      Pharynx: No oropharyngeal exudate.   Eyes:      Pupils: Pupils are equal, round, and reactive to light.   Cardiovascular:      Rate and Rhythm: Normal rate and regular rhythm.      Pulses: Normal pulses.      Heart sounds: No murmur heard.    No friction rub. No gallop.   Pulmonary:      Effort: Pulmonary effort is normal. No respiratory distress.      Breath sounds: Normal breath sounds. No wheezing.   Abdominal:      General: Bowel sounds are normal.      Palpations: Abdomen is soft.   Musculoskeletal:      Cervical back: Neck supple.   Lymphadenopathy:      Cervical: No cervical adenopathy.   Skin:     General: Skin is warm and dry.      Capillary Refill: Capillary refill takes less than 2 seconds.   Neurological:      Mental Status: He is alert and oriented to person, place, and time.         Health Maintenance         Date Due Completion Date    COVID-19 Vaccine (1) Never done ---    TETANUS VACCINE Never done ---    LDCT Lung Screen Never done ---    Colorectal Cancer Screening 04/18/2019 4/18/2018    PROSTATE-SPECIFIC ANTIGEN 02/07/2024 2/7/2023    High Dose Statin 02/17/2024 2/17/2023    Aspirin/Antiplatelet Therapy 02/17/2024 2/17/2023    Hemoglobin A1c (Diabetic Prevention Screening) 02/07/2026 2/7/2023    Lipid Panel 02/07/2028 2/7/2023            Assessment & Plan     Assessment & Plan  Problem List Items Addressed This Visit          Cardiac/Vascular    Coronary artery disease involving native coronary artery of native heart without angina pectoris (Chronic)  -Stable  -Continue statin therapy    Essential hypertension (Chronic)  -The current medical regimen is effective;  continue present plan and medications.        GI    Gastroesophageal  "reflux disease without esophagitis  - Continue PPI     Other Visit Diagnoses       Prediabetes    -  Primary  - We discussed a "heart-healthy" diet with lots of fruits and vegetables, fiber, and healthy fats (like those found in fish and certain oils). Limit sugar, red meats, unhealthy fats and late night snacking.    Relevant Orders    Hemoglobin A1C    Basic Metabolic Panel    Microalbumin/Creatinine Ratio, Urine              Health Maintenance reviewed: Tdap unavailable in clinic     Follow-up: 3 months for Pre-Diabetes follow-up    30+ minutes of total time spent on the encounter, which includes face to face time and non-face to face time preparing to see the patient (eg, review of tests), Obtaining and/or reviewing separately obtained history, documenting clinical information in the electronic or other health record, independently interpreting results (not separately reported) and communicating results to the patient/family/caregiver, or Care coordination (not separately reported).    "

## 2023-02-21 PROBLEM — N20.0 RIGHT KIDNEY STONE: Chronic | Status: ACTIVE | Noted: 2023-02-21

## 2023-02-21 PROBLEM — D71 GRANULOMATOUS DISEASE: Chronic | Status: ACTIVE | Noted: 2023-02-21

## 2023-02-21 PROBLEM — I70.0 AORTIC CALCIFICATION: Status: ACTIVE | Noted: 2023-02-21

## 2023-02-21 NOTE — PROGRESS NOTES
"Hi, Delon.    I am proud of you for getting your lung cancer screening done!    I was very happy to see that the radiologist interpreted your test result findings as "BENIGN."    This is considered a NEGATIVE screening test result. (In this case, negative means that nothing bad was found.)    You need to repeat your lung cancer screening in one year.    An incidental small (3 mm) kidney stone was noted in your right kidney, but It is not causing any problems and does not require any treatment at this point.    Thanks for letting me care for you, and thanks for trusting [facility] with your healthcare needs.    Sincerely,    NETTE Clark MD"

## 2023-02-28 ENCOUNTER — HOSPITAL ENCOUNTER (OUTPATIENT)
Dept: PREADMISSION TESTING | Facility: HOSPITAL | Age: 58
Discharge: HOME | End: 2023-02-28
Attending: FAMILY MEDICINE
Payer: COMMERCIAL

## 2023-02-28 DIAGNOSIS — Z00.00 PREVENTATIVE HEALTH CARE: ICD-10-CM

## 2023-02-28 DIAGNOSIS — Z12.11 SCREENING FOR COLORECTAL CANCER: ICD-10-CM

## 2023-02-28 DIAGNOSIS — Z12.12 SCREENING FOR COLORECTAL CANCER: ICD-10-CM

## 2023-04-19 ENCOUNTER — PATIENT MESSAGE (OUTPATIENT)
Dept: ADMINISTRATIVE | Facility: HOSPITAL | Age: 58
End: 2023-04-19
Payer: COMMERCIAL

## 2023-04-21 ENCOUNTER — PATIENT MESSAGE (OUTPATIENT)
Dept: OTHER | Facility: OTHER | Age: 58
End: 2023-04-21
Payer: COMMERCIAL

## 2023-05-11 ENCOUNTER — PATIENT MESSAGE (OUTPATIENT)
Dept: ADMINISTRATIVE | Facility: HOSPITAL | Age: 58
End: 2023-05-11
Payer: COMMERCIAL

## 2023-05-11 DIAGNOSIS — Z12.11 ENCOUNTER FOR SCREENING FOR MALIGNANT NEOPLASM OF COLON: Primary | ICD-10-CM

## 2023-05-16 ENCOUNTER — LAB VISIT (OUTPATIENT)
Dept: LAB | Facility: HOSPITAL | Age: 58
End: 2023-05-16
Attending: NURSE PRACTITIONER
Payer: COMMERCIAL

## 2023-05-16 ENCOUNTER — IMMUNIZATION (OUTPATIENT)
Dept: PHARMACY | Facility: CLINIC | Age: 58
End: 2023-05-16
Payer: COMMERCIAL

## 2023-05-16 DIAGNOSIS — R73.03 PREDIABETES: ICD-10-CM

## 2023-05-16 DIAGNOSIS — R10.32 LLQ ABDOMINAL PAIN: ICD-10-CM

## 2023-05-16 LAB
ALBUMIN/CREAT UR: NORMAL UG/MG (ref 0–30)
ANION GAP SERPL CALC-SCNC: 4 MMOL/L (ref 8–16)
BASOPHILS # BLD AUTO: 0.08 K/UL (ref 0–0.2)
BASOPHILS NFR BLD: 1.3 % (ref 0–1.9)
BUN SERPL-MCNC: 11 MG/DL (ref 6–20)
CALCIUM SERPL-MCNC: 8.6 MG/DL (ref 8.7–10.5)
CHLORIDE SERPL-SCNC: 106 MMOL/L (ref 95–110)
CO2 SERPL-SCNC: 29 MMOL/L (ref 23–29)
CREAT SERPL-MCNC: 0.8 MG/DL (ref 0.5–1.4)
CREAT UR-MCNC: 79 MG/DL (ref 23–375)
DIFFERENTIAL METHOD: NORMAL
EOSINOPHIL # BLD AUTO: 0.4 K/UL (ref 0–0.5)
EOSINOPHIL NFR BLD: 6.3 % (ref 0–8)
ERYTHROCYTE [DISTWIDTH] IN BLOOD BY AUTOMATED COUNT: 11.9 % (ref 11.5–14.5)
EST. GFR  (NO RACE VARIABLE): >60 ML/MIN/1.73 M^2
ESTIMATED AVG GLUCOSE: 117 MG/DL (ref 68–131)
GLUCOSE SERPL-MCNC: 100 MG/DL (ref 70–110)
HBA1C MFR BLD: 5.7 % (ref 4–5.6)
HCT VFR BLD AUTO: 44.2 % (ref 40–54)
HGB BLD-MCNC: 14.5 G/DL (ref 14–18)
IMM GRANULOCYTES # BLD AUTO: 0.01 K/UL (ref 0–0.04)
IMM GRANULOCYTES NFR BLD AUTO: 0.2 % (ref 0–0.5)
LYMPHOCYTES # BLD AUTO: 1.7 K/UL (ref 1–4.8)
LYMPHOCYTES NFR BLD: 28.7 % (ref 18–48)
MCH RBC QN AUTO: 30.7 PG (ref 27–31)
MCHC RBC AUTO-ENTMCNC: 32.8 G/DL (ref 32–36)
MCV RBC AUTO: 93 FL (ref 82–98)
MICROALBUMIN UR DL<=1MG/L-MCNC: <5 UG/ML
MONOCYTES # BLD AUTO: 0.5 K/UL (ref 0.3–1)
MONOCYTES NFR BLD: 8.7 % (ref 4–15)
NEUTROPHILS # BLD AUTO: 3.3 K/UL (ref 1.8–7.7)
NEUTROPHILS NFR BLD: 54.8 % (ref 38–73)
NRBC BLD-RTO: 0 /100 WBC
PLATELET # BLD AUTO: 245 K/UL (ref 150–450)
PMV BLD AUTO: 9.7 FL (ref 9.2–12.9)
POTASSIUM SERPL-SCNC: 4.9 MMOL/L (ref 3.5–5.1)
RBC # BLD AUTO: 4.73 M/UL (ref 4.6–6.2)
SODIUM SERPL-SCNC: 139 MMOL/L (ref 136–145)
WBC # BLD AUTO: 6.06 K/UL (ref 3.9–12.7)

## 2023-05-16 PROCEDURE — 83036 HEMOGLOBIN GLYCOSYLATED A1C: CPT | Performed by: NURSE PRACTITIONER

## 2023-05-16 PROCEDURE — 80048 BASIC METABOLIC PNL TOTAL CA: CPT | Performed by: NURSE PRACTITIONER

## 2023-05-16 PROCEDURE — 85025 COMPLETE CBC W/AUTO DIFF WBC: CPT | Performed by: NURSE PRACTITIONER

## 2023-05-16 PROCEDURE — 36415 COLL VENOUS BLD VENIPUNCTURE: CPT | Performed by: NURSE PRACTITIONER

## 2023-05-16 PROCEDURE — 82570 ASSAY OF URINE CREATININE: CPT | Performed by: NURSE PRACTITIONER

## 2023-06-16 ENCOUNTER — PATIENT MESSAGE (OUTPATIENT)
Dept: INTERNAL MEDICINE | Facility: CLINIC | Age: 58
End: 2023-06-16

## 2023-06-16 ENCOUNTER — OFFICE VISIT (OUTPATIENT)
Dept: INTERNAL MEDICINE | Facility: CLINIC | Age: 58
End: 2023-06-16
Payer: COMMERCIAL

## 2023-06-16 VITALS
HEART RATE: 73 BPM | HEIGHT: 70 IN | BODY MASS INDEX: 27.61 KG/M2 | OXYGEN SATURATION: 98 % | WEIGHT: 192.88 LBS | SYSTOLIC BLOOD PRESSURE: 128 MMHG | TEMPERATURE: 99 F | DIASTOLIC BLOOD PRESSURE: 80 MMHG

## 2023-06-16 DIAGNOSIS — R73.03 PREDIABETES: Chronic | ICD-10-CM

## 2023-06-16 DIAGNOSIS — I25.10 CORONARY ARTERY DISEASE INVOLVING NATIVE CORONARY ARTERY OF NATIVE HEART WITHOUT ANGINA PECTORIS: Chronic | ICD-10-CM

## 2023-06-16 DIAGNOSIS — Z12.5 SCREENING PSA (PROSTATE SPECIFIC ANTIGEN): ICD-10-CM

## 2023-06-16 DIAGNOSIS — I10 ESSENTIAL HYPERTENSION: Primary | ICD-10-CM

## 2023-06-16 DIAGNOSIS — Z12.11 SCREENING FOR COLORECTAL CANCER: ICD-10-CM

## 2023-06-16 DIAGNOSIS — Z12.12 SCREENING FOR COLORECTAL CANCER: ICD-10-CM

## 2023-06-16 DIAGNOSIS — Z00.00 PREVENTATIVE HEALTH CARE: ICD-10-CM

## 2023-06-16 DIAGNOSIS — K21.9 GASTROESOPHAGEAL REFLUX DISEASE WITHOUT ESOPHAGITIS: ICD-10-CM

## 2023-06-16 PROCEDURE — 99999 PR PBB SHADOW E&M-EST. PATIENT-LVL IV: CPT | Mod: PBBFAC,,, | Performed by: FAMILY MEDICINE

## 2023-06-16 PROCEDURE — 3008F BODY MASS INDEX DOCD: CPT | Mod: CPTII,S$GLB,, | Performed by: FAMILY MEDICINE

## 2023-06-16 PROCEDURE — 1159F PR MEDICATION LIST DOCUMENTED IN MEDICAL RECORD: ICD-10-PCS | Mod: CPTII,S$GLB,, | Performed by: FAMILY MEDICINE

## 2023-06-16 PROCEDURE — 3079F PR MOST RECENT DIASTOLIC BLOOD PRESSURE 80-89 MM HG: ICD-10-PCS | Mod: CPTII,S$GLB,, | Performed by: FAMILY MEDICINE

## 2023-06-16 PROCEDURE — 1159F MED LIST DOCD IN RCRD: CPT | Mod: CPTII,S$GLB,, | Performed by: FAMILY MEDICINE

## 2023-06-16 PROCEDURE — 3044F HG A1C LEVEL LT 7.0%: CPT | Mod: CPTII,S$GLB,, | Performed by: FAMILY MEDICINE

## 2023-06-16 PROCEDURE — 3074F SYST BP LT 130 MM HG: CPT | Mod: CPTII,S$GLB,, | Performed by: FAMILY MEDICINE

## 2023-06-16 PROCEDURE — 1160F PR REVIEW ALL MEDS BY PRESCRIBER/CLIN PHARMACIST DOCUMENTED: ICD-10-PCS | Mod: CPTII,S$GLB,, | Performed by: FAMILY MEDICINE

## 2023-06-16 PROCEDURE — 99214 PR OFFICE/OUTPT VISIT, EST, LEVL IV, 30-39 MIN: ICD-10-PCS | Mod: S$GLB,,, | Performed by: FAMILY MEDICINE

## 2023-06-16 PROCEDURE — 99999 PR PBB SHADOW E&M-EST. PATIENT-LVL IV: ICD-10-PCS | Mod: PBBFAC,,, | Performed by: FAMILY MEDICINE

## 2023-06-16 PROCEDURE — 3066F NEPHROPATHY DOC TX: CPT | Mod: CPTII,S$GLB,, | Performed by: FAMILY MEDICINE

## 2023-06-16 PROCEDURE — 3061F PR NEG MICROALBUMINURIA RESULT DOCUMENTED/REVIEW: ICD-10-PCS | Mod: CPTII,S$GLB,, | Performed by: FAMILY MEDICINE

## 2023-06-16 PROCEDURE — 99214 OFFICE O/P EST MOD 30 MIN: CPT | Mod: S$GLB,,, | Performed by: FAMILY MEDICINE

## 2023-06-16 PROCEDURE — 3061F NEG MICROALBUMINURIA REV: CPT | Mod: CPTII,S$GLB,, | Performed by: FAMILY MEDICINE

## 2023-06-16 PROCEDURE — 1160F RVW MEDS BY RX/DR IN RCRD: CPT | Mod: CPTII,S$GLB,, | Performed by: FAMILY MEDICINE

## 2023-06-16 PROCEDURE — 3074F PR MOST RECENT SYSTOLIC BLOOD PRESSURE < 130 MM HG: ICD-10-PCS | Mod: CPTII,S$GLB,, | Performed by: FAMILY MEDICINE

## 2023-06-16 PROCEDURE — 3079F DIAST BP 80-89 MM HG: CPT | Mod: CPTII,S$GLB,, | Performed by: FAMILY MEDICINE

## 2023-06-16 PROCEDURE — 3044F PR MOST RECENT HEMOGLOBIN A1C LEVEL <7.0%: ICD-10-PCS | Mod: CPTII,S$GLB,, | Performed by: FAMILY MEDICINE

## 2023-06-16 PROCEDURE — 3066F PR DOCUMENTATION OF TREATMENT FOR NEPHROPATHY: ICD-10-PCS | Mod: CPTII,S$GLB,, | Performed by: FAMILY MEDICINE

## 2023-06-16 PROCEDURE — 3008F PR BODY MASS INDEX (BMI) DOCUMENTED: ICD-10-PCS | Mod: CPTII,S$GLB,, | Performed by: FAMILY MEDICINE

## 2023-06-16 RX ORDER — METOPROLOL SUCCINATE 25 MG/1
25 TABLET, EXTENDED RELEASE ORAL NIGHTLY
Qty: 90 TABLET | Refills: 3 | Status: SHIPPED | OUTPATIENT
Start: 2023-06-16 | End: 2024-02-23 | Stop reason: SDUPTHER

## 2023-06-16 RX ORDER — ATORVASTATIN CALCIUM 40 MG/1
40 TABLET, FILM COATED ORAL NIGHTLY
Qty: 90 TABLET | Refills: 3 | Status: SHIPPED | OUTPATIENT
Start: 2023-06-16 | End: 2023-09-29 | Stop reason: SDUPTHER

## 2023-06-16 RX ORDER — OMEPRAZOLE 20 MG/1
20 CAPSULE, DELAYED RELEASE ORAL DAILY
Qty: 90 CAPSULE | Refills: 3 | Status: SHIPPED | OUTPATIENT
Start: 2023-06-16 | End: 2023-08-21 | Stop reason: SDUPTHER

## 2023-06-16 NOTE — PROGRESS NOTES
"OFFICE VISIT 6/16/23  8:20 AM CDT    Subjective   CHIEF COMPLAINT: Results    Hypertension is well-controlled. Recent labs were reviewed, which indicated a 1C in the prediabetes range. I educated him on prediabetes. His coronary artery disease is compensated without angina or equivalent symptoms. Gastroesophageal reflux disease is well controlled on his current regimen. He will be due for his annual wellness visit in February of next year. He intends to schedule fasting labs in mid-February, and an appointment with me soon thereafter.    Review of Systems   Respiratory:  Negative for chest tightness and shortness of breath.    Cardiovascular:  Negative for chest pain.   Endocrine: Negative for polydipsia and polyuria.       Objective   Vitals:    06/16/23 0810   BP: 128/80   BP Location: Right arm   Patient Position: Sitting   BP Method: Large (Manual)   Pulse: 73   Temp: 98.6 °F (37 °C)   SpO2: 98%   Weight: 87.5 kg (192 lb 14.4 oz)   Height: 5' 10.2" (1.783 m)   Physical Exam  Vitals reviewed.   Constitutional:       General: He is not in acute distress.     Appearance: Normal appearance. He is not ill-appearing, toxic-appearing or diaphoretic.   Cardiovascular:      Rate and Rhythm: Normal rate.   Pulmonary:      Effort: Pulmonary effort is normal.   Neurological:      Mental Status: He is alert and oriented to person, place, and time. Mental status is at baseline.   Psychiatric:         Mood and Affect: Mood normal.         Behavior: Behavior normal.         Judgment: Judgment normal.        Assessment and Plan   1. Essential hypertension  -     metoprolol succinate (TOPROL-XL) 25 MG 24 hr tablet; Take 1 tablet (25 mg total) by mouth every evening.  Dispense: 90 tablet; Refill: 3  -     Comprehensive Metabolic Panel; Future; Expected date: 02/09/2024  -     LIPID PANEL; Future; Expected date: 02/09/2024    2. Prediabetes  Assessment & Plan:  Lab Results   Component Value Date    HGBA1C 5.7 (H) 05/16/2023    " "HGBA1C 5.8 (H) 02/07/2023     05/16/2023    GLU 80 02/07/2023    GLU 78 02/04/2022    GLU 63 (L) 06/29/2020    GLU 84 03/16/2019     No results found for: GLUCFAST, OUBF7MB, LURH2GG  No results found for: LABINSU     Orders:  -     Hemoglobin A1C; Future; Expected date: 02/09/2024    3. Coronary artery disease involving native coronary artery of native heart without angina pectoris  -     atorvastatin (LIPITOR) 40 MG tablet; Take 1 tablet (40 mg total) by mouth every evening.  Dispense: 90 tablet; Refill: 3    4. Gastroesophageal reflux disease without esophagitis  -     omeprazole (PRILOSEC) 20 MG capsule; Take 1 capsule (20 mg total) by mouth once daily. Take with foot.  Dispense: 90 capsule; Refill: 3    5. Preventative health care  -     Hemoglobin A1C; Future; Expected date: 02/09/2024  -     PSA, SCREENING; Future; Expected date: 02/09/2024  -     Comprehensive Metabolic Panel; Future; Expected date: 02/09/2024  -     LIPID PANEL; Future; Expected date: 02/09/2024    6. Screening for colorectal cancer  -     Ambulatory referral/consult to Endo Procedure ; Future; Expected date: 06/17/2023    7. Screening PSA (prostate specific antigen)  -     PSA, SCREENING; Future; Expected date: 02/09/2024    Unless noted herein, any chronic conditions are represented as and appear stable, and no other significant complaints or concerns were reported.    Follow up in about 36 weeks (around 2/23/2024) for review test results, discuss treatment plan, annual wellness exam.   Future Appointments   Date Time Provider Department Center   6/20/2023  9:00 AM PRE-ADMIT, ENDO -[facility]   2/16/2024  7:30 AM LABORATORY, [facility] [place] [facility] [place]   2/23/2024  8:40 AM GERRY Clark MD [facility] [place]        Documentation entered by me for this encounter may have been done in part using speech-recognition technology. Although I have made an effort to ensure accuracy, "sound like" errors may " exist and should be interpreted in context.

## 2023-06-16 NOTE — ASSESSMENT & PLAN NOTE
Lab Results   Component Value Date    HGBA1C 5.7 (H) 05/16/2023    HGBA1C 5.8 (H) 02/07/2023     05/16/2023    GLU 80 02/07/2023    GLU 78 02/04/2022    GLU 63 (L) 06/29/2020    GLU 84 03/16/2019     No results found for: GLUCFAST, NBNM7YX, HAGU7VO  No results found for: LABINSU

## 2023-06-20 ENCOUNTER — HOSPITAL ENCOUNTER (OUTPATIENT)
Dept: PREADMISSION TESTING | Facility: HOSPITAL | Age: 58
Discharge: HOME | End: 2023-06-20
Attending: FAMILY MEDICINE
Payer: COMMERCIAL

## 2023-06-20 DIAGNOSIS — Z12.11 SCREENING FOR COLORECTAL CANCER: ICD-10-CM

## 2023-06-20 DIAGNOSIS — Z12.12 SCREENING FOR COLORECTAL CANCER: ICD-10-CM

## 2023-06-20 RX ORDER — SODIUM, POTASSIUM,MAG SULFATES 17.5-3.13G
SOLUTION, RECONSTITUTED, ORAL ORAL
Qty: 1 KIT | Refills: 0 | Status: ON HOLD | OUTPATIENT
Start: 2023-06-20 | End: 2023-06-28 | Stop reason: ALTCHOICE

## 2023-06-28 ENCOUNTER — ANESTHESIA EVENT (OUTPATIENT)
Dept: ENDOSCOPY | Facility: HOSPITAL | Age: 58
End: 2023-06-28
Payer: COMMERCIAL

## 2023-06-28 ENCOUNTER — HOSPITAL ENCOUNTER (OUTPATIENT)
Facility: HOSPITAL | Age: 58
Discharge: HOME | End: 2023-06-28
Attending: COLON & RECTAL SURGERY | Admitting: COLON & RECTAL SURGERY
Payer: COMMERCIAL

## 2023-06-28 ENCOUNTER — ANESTHESIA (OUTPATIENT)
Dept: ENDOSCOPY | Facility: HOSPITAL | Age: 58
End: 2023-06-28
Payer: COMMERCIAL

## 2023-06-28 DIAGNOSIS — Z12.11 SCREENING FOR COLON CANCER: ICD-10-CM

## 2023-06-28 PROCEDURE — 63600175 PHARM REV CODE 636 W HCPCS: Performed by: NURSE ANESTHETIST, CERTIFIED REGISTERED

## 2023-06-28 PROCEDURE — 27201012 HC FORCEPS, HOT/COLD, DISP: Performed by: COLON & RECTAL SURGERY

## 2023-06-28 PROCEDURE — 45380 COLONOSCOPY AND BIOPSY: CPT | Mod: 33,,, | Performed by: COLON & RECTAL SURGERY

## 2023-06-28 PROCEDURE — 45380 PR COLONOSCOPY,BIOPSY: ICD-10-PCS | Mod: 33,,, | Performed by: COLON & RECTAL SURGERY

## 2023-06-28 PROCEDURE — 45380 COLONOSCOPY AND BIOPSY: CPT | Mod: PT | Performed by: COLON & RECTAL SURGERY

## 2023-06-28 PROCEDURE — 88305 TISSUE EXAM BY PATHOLOGIST: CPT | Performed by: PATHOLOGY

## 2023-06-28 PROCEDURE — 88305 TISSUE EXAM BY PATHOLOGIST: ICD-10-PCS | Mod: 26,,, | Performed by: PATHOLOGY

## 2023-06-28 PROCEDURE — 37000009 HC ANESTHESIA EA ADD 15 MINS: Performed by: COLON & RECTAL SURGERY

## 2023-06-28 PROCEDURE — 88305 TISSUE EXAM BY PATHOLOGIST: CPT | Mod: 26,,, | Performed by: PATHOLOGY

## 2023-06-28 PROCEDURE — 25000003 PHARM REV CODE 250: Performed by: NURSE ANESTHETIST, CERTIFIED REGISTERED

## 2023-06-28 PROCEDURE — 25000003 PHARM REV CODE 250: Performed by: COLON & RECTAL SURGERY

## 2023-06-28 PROCEDURE — 37000008 HC ANESTHESIA 1ST 15 MINUTES: Performed by: COLON & RECTAL SURGERY

## 2023-06-28 RX ORDER — LIDOCAINE HYDROCHLORIDE 10 MG/ML
INJECTION, SOLUTION EPIDURAL; INFILTRATION; INTRACAUDAL; PERINEURAL
Status: DISCONTINUED | OUTPATIENT
Start: 2023-06-28 | End: 2023-06-28

## 2023-06-28 RX ORDER — DEXTROMETHORPHAN/PSEUDOEPHED 2.5-7.5/.8
DROPS ORAL
Status: COMPLETED | OUTPATIENT
Start: 2023-06-28 | End: 2023-06-28

## 2023-06-28 RX ORDER — PROPOFOL 10 MG/ML
VIAL (ML) INTRAVENOUS
Status: DISCONTINUED | OUTPATIENT
Start: 2023-06-28 | End: 2023-06-28

## 2023-06-28 RX ADMIN — PROPOFOL 50 MG: 10 INJECTION, EMULSION INTRAVENOUS at 10:06

## 2023-06-28 RX ADMIN — SODIUM CHLORIDE, SODIUM LACTATE, POTASSIUM CHLORIDE, AND CALCIUM CHLORIDE: .6; .31; .03; .02 INJECTION, SOLUTION INTRAVENOUS at 10:06

## 2023-06-28 RX ADMIN — LIDOCAINE HYDROCHLORIDE 50 MG: 10 SOLUTION INTRAVENOUS at 10:06

## 2023-06-28 RX ADMIN — PROPOFOL 100 MG: 10 INJECTION, EMULSION INTRAVENOUS at 10:06

## 2023-06-28 NOTE — BRIEF OP NOTE
[facility])  Brief Operative Note     SUMMARY     Surgery Date: 6/28/2023     Surgeon(s) and Role:     * Konrad Worley MD - Primary    Assisting Surgeon: None    Pre-op Diagnosis:  Screening for colorectal cancer [Z12.11, Z12.12]    Post-op Diagnosis:  Post-Op Diagnosis Codes:     * Screening for colorectal cancer [Z12.11, Z12.12]    Procedure(s) (LRB):  COLONOSCOPY (N/A)    Anesthesia: Choice    Description of the findings of the procedure: two polyps removed    Estimated Blood Loss: * No values recorded between 6/28/2023 10:15 AM and 6/28/2023 10:37 AM *         Specimens:   Specimen (24h ago, onward)       Start     Ordered    06/28/23 1033  Specimen to Pathology, Surgery Gastrointestinal tract  Once        Comments: Pre-op Diagnosis: Screening for colorectal cancer [Z12.11, Z12.12]Procedure(s):COLONOSCOPY 1. Splenic Flexure Polypectomy x2     References:    Click here for ordering Quick Tip   Question Answer Comment   Procedure Type: Gastrointestinal tract    Which provider would you like to cc? KONRAD WORLEY    Release to patient Immediate        06/28/23 1037                    Discharge Note    SUMMARY     Admit Date: 6/28/2023    Discharge Date and Time: 6/28/2023 10:37 AM    Hospital Course Patient was seen in the preoperative area by both myself and anesthesia. All consents were verified and all questions appropriately answered. All risks, benefits and alternatives explained to patient. Patient proceeded to endoscopy suite for colonoscopy and was discharged home postoperative once cleared by anesthesia.    Final Diagnosis: Post-Op Diagnosis Codes:     * Screening for colorectal cancer [Z12.11, Z12.12]    Disposition: Home or Self Care    Follow Up/Patient Instructions: See Provation report    Medications:  Reconciled Home Medications:      Medication List        CONTINUE taking these medications      albuterol 90 mcg/actuation inhaler  Commonly known as: PROVENTIL HFA  Inhale 2  puffs into the lungs every 6 (six) hours as needed for Wheezing. (Rescue inhaler)     aspirin 81 MG Chew  Take 81 mg by mouth.     atorvastatin 40 MG tablet  Commonly known as: LIPITOR  Take 1 tablet (40 mg total) by mouth every evening.     BENADRYL ORAL  Take by mouth.     Lactobacillus rhamnosus GG 10 billion cell capsule  Commonly known as: CULTURELLE  Take 1 capsule by mouth once daily.     metoprolol succinate 25 MG 24 hr tablet  Commonly known as: TOPROL-XL  Take 1 tablet (25 mg total) by mouth every evening.     omeprazole 20 MG capsule  Commonly known as: PRILOSEC  Take 1 capsule (20 mg total) by mouth once daily. Take with foot.     valACYclovir 1000 MG tablet  Commonly known as: VALTREX  Take 1 tablet (1,000 mg total) by mouth once daily. Start ASAP at onset of herpes symptoms. FIVE DOSES = FULL COURSE.            Discharge Procedure Orders   Diet general     Call MD for:  temperature >100.4     Call MD for:  persistent nausea and vomiting     Call MD for:  severe uncontrolled pain     Call MD for:  difficulty breathing, headache or visual disturbances     Call MD for:  redness, tenderness, or signs of infection (pain, swelling, redness, odor or green/yellow discharge around incision site)     Call MD for:  hives     Call MD for:  persistent dizziness or light-headedness     Call MD for:  extreme fatigue     Activity as tolerated      Follow-up Information       L Mat Clark MD Follow up.    Specialty: Family Medicine  Why: As needed  Contact information:  [address]  [place] [address] [phone]

## 2023-06-28 NOTE — PLAN OF CARE
Dr Worley came to bedside and discussed findings. NO N/V,  no abdominal pain, no GI bleeding, and vitals stable.  Pt discharged from unit.

## 2023-06-28 NOTE — H&P
[facility] ([facility])  Colon and Rectal Surgery  History & Physical    Patient Name: Delon Elder  MRN: [id number]  Admission Date: 6/28/2023  Attending Physician: Saul Worley MD  Primary Care Provider: GERRY Clark MD    Patient information was obtained from patient and medical records.    Subjective:     Chief Complaint/Reason for Admission: Here for Colonoscopy    History of Present Illness:  Patient is a 57 y.o. male presents for colonoscopy. Never had colonoscopy. No hematochezia, melena or change in bowel habits. No personal or fam hx of CRC or IBD, but +polyps in father.    No current facility-administered medications on file prior to encounter.     Current Outpatient Medications on File Prior to Encounter   Medication Sig    aspirin 81 MG Chew Take 81 mg by mouth.    atorvastatin (LIPITOR) 40 MG tablet Take 1 tablet (40 mg total) by mouth every evening.    metoprolol succinate (TOPROL-XL) 25 MG 24 hr tablet Take 1 tablet (25 mg total) by mouth every evening.    omeprazole (PRILOSEC) 20 MG capsule Take 1 capsule (20 mg total) by mouth once daily. Take with foot.    albuterol (PROVENTIL HFA) 90 mcg/actuation inhaler Inhale 2 puffs into the lungs every 6 (six) hours as needed for Wheezing. (Rescue inhaler)    DIPHENHYDRAMINE HCL (BENADRYL ORAL) Take by mouth.    Lactobacillus rhamnosus GG (CULTURELLE) 10 billion cell capsule Take 1 capsule by mouth once daily.    valACYclovir (VALTREX) 1000 MG tablet Take 1 tablet (1,000 mg total) by mouth once daily. Start ASAP at onset of herpes symptoms. FIVE DOSES = FULL COURSE.    [DISCONTINUED] sodium,potassium,mag sulfates (SUPREP BOWEL PREP KIT) 17.5-3.13-1.6 gram SolR Use as directed       Review of patient's allergies indicates:   Allergen Reactions    Sulfa (sulfonamide antibiotics) Shortness Of Breath       Past Medical History:   Diagnosis Date    Acute coronary syndrome     CAD (coronary artery disease) 4/13/2015    Essential hypertension      "Hyperlipidemia     Simple chronic bronchitis 2015    Tear of medial meniscus of left knee, current 2018     Past Surgical History:   Procedure Laterality Date    APPENDECTOMY      CARDIAC CATHETERIZATION      CORONARY ANGIOPLASTY       Family History       Problem Relation (Age of Onset)    Hyperlipidemia Mother, Father          Tobacco Use    Smoking status: Former     Packs/day: 1.50     Years: 30.00     Pack years: 45.00     Types: Cigarettes     Quit date: 2010     Years since quittin.2    Smokeless tobacco: Never   Substance and Sexual Activity    Alcohol use: No    Drug use: No    Sexual activity: Not on file     Review of Systems   Constitutional:  Negative for activity change, appetite change, chills, fatigue, fever and unexpected weight change.   HENT:  Negative for congestion, ear pain, sore throat and trouble swallowing.    Eyes:  Negative for pain, redness and itching.   Respiratory:  Negative for cough, shortness of breath and wheezing.    Cardiovascular:  Negative for chest pain, palpitations and leg swelling.   Gastrointestinal:  Negative for abdominal distention, abdominal pain, anal bleeding, blood in stool, constipation, diarrhea, nausea, rectal pain and vomiting.   Endocrine: Negative for cold intolerance, heat intolerance and polyuria.   Genitourinary:  Negative for dysuria, flank pain, frequency and hematuria.   Musculoskeletal:  Negative for gait problem, joint swelling and neck pain.   Skin:  Negative for color change, rash and wound.   Allergic/Immunologic: Negative for environmental allergies and immunocompromised state.   Neurological:  Negative for dizziness, speech difficulty, weakness and numbness.   Psychiatric/Behavioral:  Negative for agitation, confusion and hallucinations.    Objective:     BP (!) 138/91 (BP Location: Left arm, Patient Position: Lying)   Pulse 72   Temp 99 °F (37.2 °C) (Temporal)   Resp 17   Ht 5' 10" (1.778 m)   Wt 83.9 kg (185 lb)   SpO2 " 97%   BMI 26.54 kg/m²       Physical Exam  Constitutional:       Appearance: He is well-developed.   HENT:      Head: Normocephalic and atraumatic.   Eyes:      Conjunctiva/sclera: Conjunctivae normal.   Neck:      Thyroid: No thyromegaly.   Cardiovascular:      Rate and Rhythm: Normal rate and regular rhythm.   Pulmonary:      Effort: Pulmonary effort is normal. No respiratory distress.   Abdominal:      General: There is no distension.      Palpations: Abdomen is soft. There is no mass.      Tenderness: There is no abdominal tenderness.   Musculoskeletal:         General: No tenderness. Normal range of motion.      Cervical back: Normal range of motion.   Skin:     General: Skin is warm and dry.      Capillary Refill: Capillary refill takes less than 2 seconds.      Findings: No rash.   Neurological:      Mental Status: He is alert and oriented to person, place, and time.         Assessment/Plan:     Patient is a 57 y.o. male who presents for colonoscopy     - Ok to proceed to endoscopy suite for colonoscopy  - Consent obtained. All risks, benefits and alternatives fully explained to patient, including but not limited to bleeding, infection, perforation, and missed polyps. All questions appropriately answered to patient's satisfaction. Consent signed and placed on chart.    There are no hospital problems to display for this patient.    VTE Risk Mitigation (From admission, onward)      None            Saul Worley MD  Colon and Rectal Surgery  [facility] ([facility])

## 2023-06-28 NOTE — ANESTHESIA POSTPROCEDURE EVALUATION
Anesthesia Post Evaluation    Patient: Delon Elder    Procedure(s) Performed: Procedure(s) (LRB):  COLONOSCOPY (N/A)    Final Anesthesia Type: MAC      Patient location during evaluation: GI PACU  Patient participation: Yes- Able to Participate  Level of consciousness: awake and alert  Post-procedure vital signs: reviewed and stable  Pain management: adequate  Airway patency: patent    PONV status at discharge: No PONV  Anesthetic complications: no      Cardiovascular status: blood pressure returned to baseline  Respiratory status: unassisted and spontaneous ventilation  Hydration status: euvolemic  Follow-up not needed.          Vitals Value Taken Time   /63 06/28/23 1057   Temp 36.7 °C (98 °F) 06/28/23 1042   Pulse 66 06/28/23 1057   Resp 18 06/28/23 1057   SpO2 97 % 06/28/23 1057         Event Time   Out of Recovery 11:06:51         Pain/Omega Score: Omega Score: 10 (6/28/2023 10:57 AM)

## 2023-06-28 NOTE — ANESTHESIA PREPROCEDURE EVALUATION
06/28/2023  Delon Elder is a 57 y.o., male.      Pre-op Assessment    I have reviewed the Patient Summary Reports.    I have reviewed the NPO Status.   I have reviewed the Medications.     Review of Systems  Anesthesia Hx:  No problems with previous Anesthesia    Social:  Former Smoker    Cardiovascular:   Hypertension, well controlled CAD  CABG/stent (stent)  hyperlipidemia ECG has been reviewed. Sinus bradycardia   Otherwise normal ECG   When compared with ECG of 04-OCT-2019 13:43,   No significant change was found   Confirmed by SHORTY EMMANUEL MD ([id number]) on 2/7/2022 6:21:15 PM    Hepatic/GI:   GERD, well controlled        Physical Exam  General: Well nourished    Airway:  Mallampati: II   Mouth Opening: Normal  TM Distance: Normal  Tongue: Normal  Neck ROM: Normal ROM    Dental:  Dentures    Chest/Lungs:  Normal Respiratory Rate    Heart:  Rate: Normal  Rhythm: Regular Rhythm        Anesthesia Plan  Type of Anesthesia, risks & benefits discussed:    Anesthesia Type: MAC  Intra-op Monitoring Plan: Standard ASA Monitors  Post Op Pain Control Plan: multimodal analgesia  Induction:  IV  Informed Consent: Informed consent signed with the Patient and all parties understand the risks and agree with anesthesia plan.  All questions answered.   ASA Score: 2  Day of Surgery Review of History & Physical: H&P Update referred to the surgeon/provider.I have interviewed and examined the patient. I have reviewed the patient's H&P dated: There are no significant changes.     Ready For Surgery From Anesthesia Perspective.     .

## 2023-06-28 NOTE — TRANSFER OF CARE
"Anesthesia Transfer of Care Note    Patient: Delon lEder    Procedure(s) Performed: Procedure(s) (LRB):  COLONOSCOPY (N/A)    Patient location: GI    Anesthesia Type: MAC    Transport from OR: Transported from OR on room air with adequate spontaneous ventilation    Post pain: adequate analgesia    Post assessment: no apparent anesthetic complications    Post vital signs: stable    Level of consciousness: sedated    Nausea/Vomiting: no nausea/vomiting    Complications: none    Transfer of care protocol was followed      Last vitals:   Visit Vitals  BP (!) 138/91 (BP Location: Left arm, Patient Position: Lying)   Pulse 72   Temp 37.2 °C (99 °F) (Temporal)   Resp 17   Ht 5' 10" (1.778 m)   Wt 83.9 kg (185 lb)   SpO2 97%   BMI 26.54 kg/m²     "

## 2023-06-28 NOTE — PROVATION PATIENT INSTRUCTIONS
Discharge Summary/Instructions after an Endoscopic Procedure  Patient Name: Delon Elder  Patient MRN: [id number]  Patient YOB: 1965 Wednesday, June 28, 2023 Saul Worley MD  Dear patient,  As a result of recent federal legislation (The Federal Cures Act), you may   receive lab or pathology results from your procedure in your [facility]   account before your physician is able to contact you. Your physician or   their representative will relay the results to you with their   recommendations at their soonest availability.  Thank you,  RESTRICTIONS:  During your procedure today, you received medications for sedation.  These   medications may affect your judgment, balance and coordination.  Therefore,   for 24 hours, you have the following restrictions:   - DO NOT drive a car, operate machinery, make legal/financial decisions,   sign important papers or drink alcohol.    ACTIVITY:  Today: no heavy lifting, straining or running due to procedural   sedation/anesthesia.  The following day: return to full activity including work.  DIET:  Eat and drink normally unless instructed otherwise.     TREATMENT FOR COMMON SIDE EFFECTS:  - Mild abdominal pain, nausea, belching, bloating or excessive gas:  rest,   eat lightly and use a heating pad.  - Sore Throat: treat with throat lozenges and/or gargle with warm salt   water.  - Because air was used during the procedure, expelling large amounts of air   from your rectum or belching is normal.  - If a bowel prep was taken, you may not have a bowel movement for 1-3 days.    This is normal.  SYMPTOMS TO WATCH FOR AND REPORT TO YOUR PHYSICIAN:  1. Abdominal pain or bloating, other than gas cramps.  2. Chest pain.  3. Back pain.  4. Signs of infection such as: chills or fever occurring within 24 hours   after the procedure.  5. Rectal bleeding, which would show as bright red, maroon, or black stools.   (A tablespoon of blood from the rectum is not serious, especially if    hemorrhoids are present.)  6. Vomiting.  7. Weakness or dizziness.  GO DIRECTLY TO THE NEAREST EMERGENCY ROOM IF YOU HAVE ANY OF THE FOLLOWING:      Difficulty breathing              Chills and/or fever over 101 F   Persistent vomiting and/or vomiting blood   Severe abdominal pain   Severe chest pain   Black, tarry stools   Bleeding- more than one tablespoon   Any other symptom or condition that you feel may need urgent attention  Your doctor recommends these additional instructions:  If any biopsies were taken, your doctors clinic will contact you in 1 to 2   weeks with any results.  - Discharge patient to home.   - High fiber diet.   - Continue present medications.   - Await pathology results.   - Repeat colonoscopy in 5 years for surveillance.   - Return to primary care physician PRN.  For questions, problems or results please call your physician Saul Worley MD at Work:  [phone]  If you have any questions about the above instructions, call the GI   department at [phone] or call the endoscopy unit at [phone]   from 7am until 3 pm.  [facility] [place], EMERGENCY ROOM PHONE NUMBER:   [phone]  IF A COMPLICATION OR EMERGENCY SITUATION ARISES AND YOU ARE UNABLE TO REACH   YOUR PHYSICIAN - GO DIRECTLY TO THE EMERGENCY ROOM.  I have read or have had read to me these discharge instructions for my   procedure and have received a written copy.  I understand these   instructions and will follow-up with my physician if I have any questions.     __________________________________       _____________________________________  Nurse Signature                                          Patient/Designated   Responsible Party Signature  MD Saul Espinoza MD  6/28/2023 10:39:56 AM  This report has been verified and signed electronically.  Dear patient,  As a result of recent federal legislation (The Federal Cures Act), you may   receive lab or pathology results  from your procedure in your [facility]   account before your physician is able to contact you. Your physician or   their representative will relay the results to you with their   recommendations at their soonest availability.  Thank you,  PROVATION

## 2023-06-29 ENCOUNTER — E-VISIT (OUTPATIENT)
Dept: INTERNAL MEDICINE | Facility: CLINIC | Age: 58
End: 2023-06-29
Payer: COMMERCIAL

## 2023-06-29 VITALS
TEMPERATURE: 98 F | OXYGEN SATURATION: 97 % | RESPIRATION RATE: 18 BRPM | HEART RATE: 66 BPM | SYSTOLIC BLOOD PRESSURE: 129 MMHG | BODY MASS INDEX: 26.48 KG/M2 | HEIGHT: 70 IN | WEIGHT: 185 LBS | DIASTOLIC BLOOD PRESSURE: 63 MMHG

## 2023-06-29 DIAGNOSIS — J02.9 SORE THROAT: Primary | ICD-10-CM

## 2023-06-29 PROCEDURE — 99422 OL DIG E/M SVC 11-20 MIN: CPT | Mod: ,,, | Performed by: FAMILY MEDICINE

## 2023-06-29 PROCEDURE — 99422 PR E&M, ONLINE DIGIT, EST, < 7 DAYS,  11-20 MINS: ICD-10-PCS | Mod: ,,, | Performed by: FAMILY MEDICINE

## 2023-06-30 ENCOUNTER — PATIENT MESSAGE (OUTPATIENT)
Dept: INTERNAL MEDICINE | Facility: CLINIC | Age: 58
End: 2023-06-30
Payer: COMMERCIAL

## 2023-06-30 LAB
FINAL PATHOLOGIC DIAGNOSIS: NORMAL
GROSS: NORMAL
Lab: NORMAL

## 2023-06-30 NOTE — PROGRESS NOTES
Patient ID: Delon Elder is a 57 y.o. male.    Chief Complaint: URI (Entered automatically based on patient selection in Patient Portal.)    The patient initiated a request through [employer] on 6/29/2023 for evaluation and management with a chief complaint of URI (Entered automatically based on patient selection in Patient Portal.)     I evaluated the questionnaire submission on 06/29/2023.    [facility] Peq Evisit Upper Respitatory/Cough Questionnaire    6/29/2023  1:27 PM CDT - Filed by Patient   Do you agree to participate in an E-Visit? Yes   If you have any of the following symptoms, please present to your local ER or call 911:  I acknowledge   What is the main issue that you would like for your doctor to address today? Strep throat   Are you able to take your vital signs? No   What symptoms do you currently have?  Fatigue;  Sore throat   Have you had any of the following? None of the above   Have you ever smoked? I have smoked in the past   Have you had a fever? No   When did your symptoms first appear? 6/21/2023   In the last two weeks, have you been in close contact with someone who has COVID-19 or the Flu? No   In the last two weeks, have you worked or volunteered in a healthcare facility or as a ? Healthcare facilities include a hospital, medical or dental clinic, long-term care facility, or nursing home No   Do you live in a long-term care facility, nursing home, group home, or homeless shelter? No   List what you have done or taken to help your symptoms. Cough drops   How severe are your symptoms? Severe   Have your symptoms improved since they first appeared? Worse   Have you taken an at home Covid test? No   Have you taken a Flu test? No   Have you been fully vaccinated for COVID? (2 Pfizer, 2 Moderna or 1 Oj & Oj vaccine injections) No   Have you received your first dose of the Pfizer or Moderna COVID vaccine? No   Have you recieved a Flu shot? Yes   When did you recieve your Flu  shot? 11/1/2022   Do you have transportation to get tested for COVID if it is indicated and ordered for you at an [facility] location? No   Provide any information you feel is important to your history not asked above    Please attach any relevant images or files          Recent Labs Obtained:  No visits with results within 7 Day(s) from this visit.   Latest known visit with results is:   Lab Visit on 05/16/2023   Component Date Value Ref Range Status    WBC 05/16/2023 6.06  3.90 - 12.70 K/uL Final    RBC 05/16/2023 4.73  4.60 - 6.20 M/uL Final    Hemoglobin 05/16/2023 14.5  14.0 - 18.0 g/dL Final    Hematocrit 05/16/2023 44.2  40.0 - 54.0 % Final    MCV 05/16/2023 93  82 - 98 fL Final    MCH 05/16/2023 30.7  27.0 - 31.0 pg Final    MCHC 05/16/2023 32.8  32.0 - 36.0 g/dL Final    RDW 05/16/2023 11.9  11.5 - 14.5 % Final    Platelets 05/16/2023 245  150 - 450 K/uL Final    MPV 05/16/2023 9.7  9.2 - 12.9 fL Final    Immature Granulocytes 05/16/2023 0.2  0.0 - 0.5 % Final    Gran # (ANC) 05/16/2023 3.3  1.8 - 7.7 K/uL Final    Immature Grans (Abs) 05/16/2023 0.01  0.00 - 0.04 K/uL Final    Comment: Mild elevation in immature granulocytes is non specific and   can be seen in a variety of conditions including stress response,   acute inflammation, trauma and pregnancy. Correlation with other   laboratory and clinical findings is essential.      Lymph # 05/16/2023 1.7  1.0 - 4.8 K/uL Final    Mono # 05/16/2023 0.5  0.3 - 1.0 K/uL Final    Eos # 05/16/2023 0.4  0.0 - 0.5 K/uL Final    Baso # 05/16/2023 0.08  0.00 - 0.20 K/uL Final    nRBC 05/16/2023 0  0 /100 WBC Final    Gran % 05/16/2023 54.8  38.0 - 73.0 % Final    Lymph % 05/16/2023 28.7  18.0 - 48.0 % Final    Mono % 05/16/2023 8.7  4.0 - 15.0 % Final    Eosinophil % 05/16/2023 6.3  0.0 - 8.0 % Final    Basophil % 05/16/2023 1.3  0.0 - 1.9 % Final    Differential Method 05/16/2023 Automated   Final    Hemoglobin A1C 05/16/2023 5.7 (H)  4.0 - 5.6 % Final    Comment: ADA  Screening Guidelines:  5.7-6.4%  Consistent with prediabetes  >or=6.5%  Consistent with diabetes    High levels of fetal hemoglobin interfere with the HbA1C  assay. Heterozygous hemoglobin variants (HbS, HgC, etc)do  not significantly interfere with this assay.   However, presence of multiple variants may affect accuracy.      Estimated Avg Glucose 05/16/2023 117  68 - 131 mg/dL Final    Sodium 05/16/2023 139  136 - 145 mmol/L Final    Potassium 05/16/2023 4.9  3.5 - 5.1 mmol/L Final    Chloride 05/16/2023 106  95 - 110 mmol/L Final    CO2 05/16/2023 29  23 - 29 mmol/L Final    Glucose 05/16/2023 100  70 - 110 mg/dL Final    BUN 05/16/2023 11  6 - 20 mg/dL Final    Creatinine 05/16/2023 0.8  0.5 - 1.4 mg/dL Final    Calcium 05/16/2023 8.6 (L)  8.7 - 10.5 mg/dL Final    Anion Gap 05/16/2023 4 (L)  8 - 16 mmol/L Final    eGFR 05/16/2023 >60.0  >60 mL/min/1.73 m^2 Final    Microalbumin, Urine 05/16/2023 <5.0  ug/mL Final    Creatinine, Urine 05/16/2023 79.0  23.0 - 375.0 mg/dL Final    Microalb/Creat Ratio 05/16/2023 Unable to calculate  0.0 - 30.0 ug/mg Final       Encounter Diagnosis   Name Primary?    Sore throat Yes        No orders of the defined types were placed in this encounter.           No follow-ups on file.      E-Visit Time Tracking:    Day 1 Time (in minutes): 12     Total Time (in minutes): 12      Cumulative time over 7 days: 11 to 20 minutes (CPT 91008)     Dear Delon,    Thank you for reaching out to me. I'm really sorry to hear that your throat has been causing you discomfort for over a week. It can be truly bothersome to deal with a persistent sore throat.    You mentioned that you suspect it might be strep throat. Strep throat can indeed be very painful, but it's usually accompanied by fever and swollen lymph nodes, which you did not mention. There might be several other reasons behind your sore throat, and it's essential to get to the bottom of this.    Your health is our top priority. To make  sure you receive the right treatment, I would highly recommend that you come in for a physical exam. It will allow us to assess your throat properly and, if necessary, conduct a test for strep throat. This is the most responsible way to ensure that you get the right treatment and start feeling better soon.    Please schedule an in-office appointment with me or one of my partners ASAP, or if it's more convenient, you can visit an urgent care facility.    I hope you feel better soon.    Warm regards,    Dr. WHYTE

## 2023-07-17 ENCOUNTER — OFFICE VISIT (OUTPATIENT)
Dept: INTERNAL MEDICINE | Facility: CLINIC | Age: 58
End: 2023-07-17
Payer: COMMERCIAL

## 2023-07-17 DIAGNOSIS — L64.9 ANDROGENIC ALOPECIA: ICD-10-CM

## 2023-07-17 DIAGNOSIS — I10 ESSENTIAL HYPERTENSION: Chronic | ICD-10-CM

## 2023-07-17 DIAGNOSIS — Z41.8: Primary | ICD-10-CM

## 2023-07-17 PROCEDURE — 99213 PR OFFICE/OUTPT VISIT, EST, LEVL III, 20-29 MIN: ICD-10-PCS | Mod: 95,,, | Performed by: FAMILY MEDICINE

## 2023-07-17 PROCEDURE — 3044F HG A1C LEVEL LT 7.0%: CPT | Mod: CPTII,95,, | Performed by: FAMILY MEDICINE

## 2023-07-17 PROCEDURE — 3061F NEG MICROALBUMINURIA REV: CPT | Mod: CPTII,95,, | Performed by: FAMILY MEDICINE

## 2023-07-17 PROCEDURE — 3044F PR MOST RECENT HEMOGLOBIN A1C LEVEL <7.0%: ICD-10-PCS | Mod: CPTII,95,, | Performed by: FAMILY MEDICINE

## 2023-07-17 PROCEDURE — 1160F PR REVIEW ALL MEDS BY PRESCRIBER/CLIN PHARMACIST DOCUMENTED: ICD-10-PCS | Mod: CPTII,95,, | Performed by: FAMILY MEDICINE

## 2023-07-17 PROCEDURE — 3066F NEPHROPATHY DOC TX: CPT | Mod: CPTII,95,, | Performed by: FAMILY MEDICINE

## 2023-07-17 PROCEDURE — 3061F PR NEG MICROALBUMINURIA RESULT DOCUMENTED/REVIEW: ICD-10-PCS | Mod: CPTII,95,, | Performed by: FAMILY MEDICINE

## 2023-07-17 PROCEDURE — 1160F RVW MEDS BY RX/DR IN RCRD: CPT | Mod: CPTII,95,, | Performed by: FAMILY MEDICINE

## 2023-07-17 PROCEDURE — 1159F PR MEDICATION LIST DOCUMENTED IN MEDICAL RECORD: ICD-10-PCS | Mod: CPTII,95,, | Performed by: FAMILY MEDICINE

## 2023-07-17 PROCEDURE — 99213 OFFICE O/P EST LOW 20 MIN: CPT | Mod: 95,,, | Performed by: FAMILY MEDICINE

## 2023-07-17 PROCEDURE — 3066F PR DOCUMENTATION OF TREATMENT FOR NEPHROPATHY: ICD-10-PCS | Mod: CPTII,95,, | Performed by: FAMILY MEDICINE

## 2023-07-17 PROCEDURE — 1159F MED LIST DOCD IN RCRD: CPT | Mod: CPTII,95,, | Performed by: FAMILY MEDICINE

## 2023-07-17 RX ORDER — FINASTERIDE 1 MG/1
1 TABLET, FILM COATED ORAL DAILY
Qty: 90 TABLET | Refills: 3 | Status: SHIPPED | OUTPATIENT
Start: 2023-07-17 | End: 2024-02-23 | Stop reason: SDUPTHER

## 2023-07-17 NOTE — ASSESSMENT & PLAN NOTE
BP Readings from Last 6 Encounters:   06/28/23 129/63   06/16/23 128/80   02/17/23 120/84   02/07/23 126/82   12/08/22 120/88   02/04/22 120/86      Last 5 Patient Entered Readings                Current 30 Day Average:   Recent Readings 5/2/2023 5/2/2023 5/2/2023 4/20/2023 4/20/2023   SBP (mmHg) 121 111 119 108 108   DBP (mmHg) 84 76 84 74 72   Pulse 72 73 74 69 71               Lab Results   Component Value Date    EGFRNORACEVR >60.0 05/16/2023    CREATININE 0.8 05/16/2023    BUN 11 05/16/2023    K 4.9 05/16/2023     05/16/2023     05/16/2023     Results for orders placed or performed during the hospital encounter of 02/04/22   SCHEDULED EKG 12-LEAD (to Muse)    Collection Time: 02/04/22  2:44 PM    Narrative    Test Reason : I10    Vent. Rate : 055 BPM     Atrial Rate : 055 BPM     P-R Int : 182 ms          QRS Dur : 108 ms      QT Int : 398 ms       P-R-T Axes : 066 -06 045 degrees     QTc Int : 380 ms    Sinus bradycardia  Otherwise normal ECG  When compared with ECG of 04-OCT-2019 13:43,  No significant change was found  Confirmed by SHORTY EMMANUEL MD ([id number]) on 2/7/2022 6:21:15 PM    Referred By: NETTE SYLVESTER           Confirmed By:SHORTY EMMANUEL MD
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
## 2023-07-17 NOTE — PATIENT INSTRUCTIONS
Delon Medina.    I sent your prescription (below) to [facility] as we discussed.    Check out the web site https://www.[employer]ST [employer]ELT. There you can get discount coupons for prescription drugs and compare drug prices at different pharmacies. You can also download the [employer] jose e for your smart phone.    Learn additional important information about finasteride by visiting https://medlineplus.gov/druginfo and searching for the drug by name.    Let me know if I can do anything else for you.    All the best,    NETTE Clark MD    Medications Ordered This Encounter   Medications    finasteride (PROPECIA) 1 mg tablet     Sig: Take 1 tablet (1 mg total) by mouth once daily.     Dispense:  90 tablet     Refill:  3     Please use discount codes (Member ID, Group, BIN, PCN) from www.[employer] and give him the best price. Thank you for your help.

## 2023-08-02 ENCOUNTER — PATIENT MESSAGE (OUTPATIENT)
Dept: INTERNAL MEDICINE | Facility: CLINIC | Age: 58
End: 2023-08-02
Payer: COMMERCIAL

## 2023-08-21 DIAGNOSIS — K21.9 GASTROESOPHAGEAL REFLUX DISEASE WITHOUT ESOPHAGITIS: ICD-10-CM

## 2023-08-22 RX ORDER — OMEPRAZOLE 20 MG/1
20 CAPSULE, DELAYED RELEASE ORAL DAILY
Qty: 90 CAPSULE | Refills: 3 | Status: SHIPPED | OUTPATIENT
Start: 2023-08-22 | End: 2024-02-23 | Stop reason: SDUPTHER

## 2023-08-22 NOTE — TELEPHONE ENCOUNTER
No care due was identified.  [facility] Embedded Care Due Messages. Reference number: [id number].   8/21/2023 9:54:06 PM CDT

## 2023-08-22 NOTE — TELEPHONE ENCOUNTER
Refill Decision Note   Delon Elder  is requesting a refill authorization.  Brief Assessment and Rationale for Refill:  Approve     Medication Therapy Plan:         Comments:     Note composed:11:30 AM 08/22/2023

## 2023-09-29 ENCOUNTER — LAB VISIT (OUTPATIENT)
Dept: LAB | Facility: HOSPITAL | Age: 58
End: 2023-09-29
Attending: FAMILY MEDICINE
Payer: COMMERCIAL

## 2023-09-29 ENCOUNTER — OFFICE VISIT (OUTPATIENT)
Dept: INTERNAL MEDICINE | Facility: CLINIC | Age: 58
End: 2023-09-29
Payer: COMMERCIAL

## 2023-09-29 VITALS
WEIGHT: 194 LBS | BODY MASS INDEX: 27.77 KG/M2 | TEMPERATURE: 96 F | HEART RATE: 62 BPM | HEIGHT: 70 IN | SYSTOLIC BLOOD PRESSURE: 126 MMHG | OXYGEN SATURATION: 99 % | DIASTOLIC BLOOD PRESSURE: 84 MMHG

## 2023-09-29 DIAGNOSIS — I25.10 CORONARY ARTERY DISEASE INVOLVING NATIVE CORONARY ARTERY OF NATIVE HEART WITHOUT ANGINA PECTORIS: Chronic | ICD-10-CM

## 2023-09-29 DIAGNOSIS — Z11.3 ROUTINE SCREENING FOR STI (SEXUALLY TRANSMITTED INFECTION): ICD-10-CM

## 2023-09-29 DIAGNOSIS — J84.10 PULMONARY GRANULOMA: Chronic | ICD-10-CM

## 2023-09-29 DIAGNOSIS — I70.0 AORTIC CALCIFICATION: Chronic | ICD-10-CM

## 2023-09-29 DIAGNOSIS — F52.32 DELAYED EJACULATION: Primary | ICD-10-CM

## 2023-09-29 DIAGNOSIS — F52.32 DELAYED EJACULATION: ICD-10-CM

## 2023-09-29 LAB
BILIRUB UR QL STRIP: NEGATIVE
CLARITY UR: CLEAR
COLOR UR: ABNORMAL
GLUCOSE UR QL STRIP: NEGATIVE
HGB UR QL STRIP: NEGATIVE
KETONES UR QL STRIP: NEGATIVE
LEUKOCYTE ESTERASE UR QL STRIP: NEGATIVE
NITRITE UR QL STRIP: NEGATIVE
PH UR STRIP: 6 [PH] (ref 5–8)
PROT UR QL STRIP: NEGATIVE
SP GR UR STRIP: >=1.03 (ref 1–1.03)
URN SPEC COLLECT METH UR: ABNORMAL

## 2023-09-29 PROCEDURE — 3066F PR DOCUMENTATION OF TREATMENT FOR NEPHROPATHY: ICD-10-PCS | Mod: CPTII,S$GLB,, | Performed by: FAMILY MEDICINE

## 2023-09-29 PROCEDURE — 99214 PR OFFICE/OUTPT VISIT, EST, LEVL IV, 30-39 MIN: ICD-10-PCS | Mod: S$GLB,,, | Performed by: FAMILY MEDICINE

## 2023-09-29 PROCEDURE — 1160F RVW MEDS BY RX/DR IN RCRD: CPT | Mod: CPTII,S$GLB,, | Performed by: FAMILY MEDICINE

## 2023-09-29 PROCEDURE — 3044F PR MOST RECENT HEMOGLOBIN A1C LEVEL <7.0%: ICD-10-PCS | Mod: CPTII,S$GLB,, | Performed by: FAMILY MEDICINE

## 2023-09-29 PROCEDURE — 99214 OFFICE O/P EST MOD 30 MIN: CPT | Mod: S$GLB,,, | Performed by: FAMILY MEDICINE

## 2023-09-29 PROCEDURE — 1160F PR REVIEW ALL MEDS BY PRESCRIBER/CLIN PHARMACIST DOCUMENTED: ICD-10-PCS | Mod: CPTII,S$GLB,, | Performed by: FAMILY MEDICINE

## 2023-09-29 PROCEDURE — 3008F PR BODY MASS INDEX (BMI) DOCUMENTED: ICD-10-PCS | Mod: CPTII,S$GLB,, | Performed by: FAMILY MEDICINE

## 2023-09-29 PROCEDURE — 3061F NEG MICROALBUMINURIA REV: CPT | Mod: CPTII,S$GLB,, | Performed by: FAMILY MEDICINE

## 2023-09-29 PROCEDURE — 87591 N.GONORRHOEAE DNA AMP PROB: CPT | Performed by: FAMILY MEDICINE

## 2023-09-29 PROCEDURE — 3044F HG A1C LEVEL LT 7.0%: CPT | Mod: CPTII,S$GLB,, | Performed by: FAMILY MEDICINE

## 2023-09-29 PROCEDURE — 99999 PR PBB SHADOW E&M-EST. PATIENT-LVL III: ICD-10-PCS | Mod: PBBFAC,,, | Performed by: FAMILY MEDICINE

## 2023-09-29 PROCEDURE — 99999 PR PBB SHADOW E&M-EST. PATIENT-LVL III: CPT | Mod: PBBFAC,,, | Performed by: FAMILY MEDICINE

## 2023-09-29 PROCEDURE — 3066F NEPHROPATHY DOC TX: CPT | Mod: CPTII,S$GLB,, | Performed by: FAMILY MEDICINE

## 2023-09-29 PROCEDURE — 3074F SYST BP LT 130 MM HG: CPT | Mod: CPTII,S$GLB,, | Performed by: FAMILY MEDICINE

## 2023-09-29 PROCEDURE — 3008F BODY MASS INDEX DOCD: CPT | Mod: CPTII,S$GLB,, | Performed by: FAMILY MEDICINE

## 2023-09-29 PROCEDURE — 3061F PR NEG MICROALBUMINURIA RESULT DOCUMENTED/REVIEW: ICD-10-PCS | Mod: CPTII,S$GLB,, | Performed by: FAMILY MEDICINE

## 2023-09-29 PROCEDURE — 1159F MED LIST DOCD IN RCRD: CPT | Mod: CPTII,S$GLB,, | Performed by: FAMILY MEDICINE

## 2023-09-29 PROCEDURE — 81003 URINALYSIS AUTO W/O SCOPE: CPT | Performed by: FAMILY MEDICINE

## 2023-09-29 PROCEDURE — 3079F PR MOST RECENT DIASTOLIC BLOOD PRESSURE 80-89 MM HG: ICD-10-PCS | Mod: CPTII,S$GLB,, | Performed by: FAMILY MEDICINE

## 2023-09-29 PROCEDURE — 3074F PR MOST RECENT SYSTOLIC BLOOD PRESSURE < 130 MM HG: ICD-10-PCS | Mod: CPTII,S$GLB,, | Performed by: FAMILY MEDICINE

## 2023-09-29 PROCEDURE — 3079F DIAST BP 80-89 MM HG: CPT | Mod: CPTII,S$GLB,, | Performed by: FAMILY MEDICINE

## 2023-09-29 PROCEDURE — 1159F PR MEDICATION LIST DOCUMENTED IN MEDICAL RECORD: ICD-10-PCS | Mod: CPTII,S$GLB,, | Performed by: FAMILY MEDICINE

## 2023-09-29 RX ORDER — ATORVASTATIN CALCIUM 40 MG/1
40 TABLET, FILM COATED ORAL NIGHTLY
Qty: 90 TABLET | Refills: 3 | Status: SHIPPED | OUTPATIENT
Start: 2023-09-29 | End: 2024-02-23 | Stop reason: ALTCHOICE

## 2023-09-29 NOTE — ASSESSMENT & PLAN NOTE
ASSESSMENT: Asymptomatic and clinically stable on atorvastatin.  TREATMENT PLAN: Continue present treatment plan.

## 2023-09-29 NOTE — PROGRESS NOTES
"OFFICE VISIT 9/29/23  2:00 PM CDT    CHIEF COMPLAINT: Ejaculation Problem    1. Delayed ejaculation  2. Routine screening for STI (sexually transmitted infection)  He reports that recently his ejaculations have not occurred simultaneously with his orgasm.  Instead, he reports that he will orgasm, and within a few seconds thereafte his semen will "leak" from his urethra instead of "squirting."  He reports no dysuria, hematuria, urinary hesitancy, frequency, or nocturia.  He reports no sensation of incomplete bladder evacuation.  He reports no testicular pain or abnormal scrotal mass.  He reports no fever.   He reports that he uses erotic toys for masturbation, and he expressed concern that they may not be kept clean enough, and he is requesting urine testing for possible infection. Hygiene education provided.  -     Urinalysis; Future; Expected date: 09/29/2023  -     Trichomonas vaginalis, RNA, Qual, Urine; Future; Expected date: 09/29/2023  -     C. trachomatis/N. gonorrhoeae by AMP DNA [facility]; Urine; Future; Expected date: 09/29/2023    3. Coronary artery disease involving native coronary artery of native heart without angina pectoris  Asymptomatic. Clinically stable.  -     atorvastatin (LIPITOR) 40 MG tablet; Take 1 tablet (40 mg total) by mouth every evening.  Dispense: 90 tablet; Refill: 3    4. Aortic calcification  Overview:  CT Chest 2/17/2023 Findings  Coronary artery calcifications  Aortic calcifications without aneurysmal development    Assessment & Plan:  ASSESSMENT: Asymptomatic and clinically stable on atorvastatin.  TREATMENT PLAN: Continue present treatment plan.      Orders:  -     atorvastatin (LIPITOR) 40 MG tablet; Take 1 tablet (40 mg total) by mouth every evening.  Dispense: 90 tablet; Refill: 3    5. Pulmonary granuloma  Overview:  CT Chest 2/17/2023 Findings  4 mm calcified granuloma anterior right lower lobe    Assessment & Plan:  ASSESSMENT: Asymptomatic. Clinically stable.   TREATMENT " "PLAN: Monitor clinically. Image periodically.  Unless noted herein, any chronic conditions are represented as and appear stable, and no other significant complaints or concerns were reported.    Future Appointments   Date Time Provider Department Center   9/29/2023  2:50 PM SPECIMEN, [facility]   2/16/2024  7:30 AM LABORATORY, [facility] [place]   2/23/2024  8:40 AM GERRY Clark MD [facility]       Review of Systems   Constitutional:  Negative for fever.   Gastrointestinal:  Negative for abdominal pain.   Genitourinary:  Negative for bladder incontinence, difficulty urinating, discharge, dysuria, frequency, genital sores, hematuria, penile pain, scrotal swelling, testicular pain and urgency.       Vitals:    09/29/23 1335   BP: 126/84   BP Location: Right arm   Patient Position: Sitting   BP Method: Medium (Manual)   Pulse: 62   Temp: 96 °F (35.6 °C)   TempSrc: Tympanic   SpO2: 99%   Weight: 88 kg (194 lb 0.1 oz)   Height: 5' 10" (1.778 m)   Physical Exam  Vitals reviewed.   Constitutional:       General: He is not in acute distress.     Appearance: Normal appearance. He is not ill-appearing, toxic-appearing or diaphoretic.   Cardiovascular:      Rate and Rhythm: Normal rate.   Pulmonary:      Effort: Pulmonary effort is normal.   Abdominal:      Hernia: There is no hernia in the left inguinal area or right inguinal area.   Genitourinary:     Pubic Area: No rash.       Penis: Normal and circumcised.       Testes: Normal.      Epididymis:      Right: Normal.      Left: Normal.   Lymphadenopathy:      Lower Body: No right inguinal adenopathy. No left inguinal adenopathy.   Neurological:      Mental Status: He is alert and oriented to person, place, and time. Mental status is at baseline.   Psychiatric:         Mood and Affect: Mood normal.         Behavior: Behavior normal.         Judgment: Judgment normal.       Documentation entered by me for this encounter may have been done " "in part using speech-recognition technology. Although I have made an effort to ensure accuracy, "sound like" errors may exist and should be interpreted in context.  "

## 2023-09-29 NOTE — ASSESSMENT & PLAN NOTE
ASSESSMENT: Asymptomatic. Clinically stable.   TREATMENT PLAN: Monitor clinically. Image periodically.

## 2023-09-30 LAB
C TRACH DNA SPEC QL NAA+PROBE: NOT DETECTED
N GONORRHOEA DNA SPEC QL NAA+PROBE: NOT DETECTED

## 2023-10-10 ENCOUNTER — TELEPHONE (OUTPATIENT)
Dept: INTERNAL MEDICINE | Facility: CLINIC | Age: 58
End: 2023-10-10
Payer: COMMERCIAL

## 2023-10-10 NOTE — TELEPHONE ENCOUNTER
----- Message from Angeles Griffin sent at 10/10/2023  9:30 AM CDT -----  Regarding: Symptom: Cough  Contact: sherif chilel  Symptom: Cough  Outcome: Schedule an appointment to be seen within 24 hours.  Reason: Caller denied all higher acuity questions    The caller accepted this outcome/ pt would like cough med called in

## 2023-11-16 ENCOUNTER — PATIENT MESSAGE (OUTPATIENT)
Dept: INTERNAL MEDICINE | Facility: CLINIC | Age: 58
End: 2023-11-16
Payer: COMMERCIAL

## 2023-11-28 ENCOUNTER — TELEPHONE (OUTPATIENT)
Dept: INTERNAL MEDICINE | Facility: CLINIC | Age: 58
End: 2023-11-28
Payer: COMMERCIAL

## 2023-11-28 NOTE — TELEPHONE ENCOUNTER
Pt is requesting a general surgery referral for an inguinal hernia. States it is now in his scrotum and it burns. Pt states it feels like the inguinal hernia is cutting of blood supply to scrotal area. Please advise.//ddw

## 2023-11-28 NOTE — TELEPHONE ENCOUNTER
----- Message from Santosh Landeros sent at 11/28/2023  2:55 PM CST -----  Contact: self  ..Type:  Patient Requesting Referral    Who Called:.Delon Elder  Does the patient already have the specialty appointment scheduled?:no   If yes, what is the date of that appointment?:  Referral to What Specialty:General surgery   Reason for Referral:hernia near groin going into sac   Does the patient want the referral with a specific physician?:no   [facility]   Patient Requesting a Response?:yes   Would the patient rather a call back or a response via [facility]? Call back   Best Call Back Number:.[phone]   Additional Information:

## 2023-12-16 ENCOUNTER — PATIENT MESSAGE (OUTPATIENT)
Dept: INTERNAL MEDICINE | Facility: CLINIC | Age: 58
End: 2023-12-16
Payer: COMMERCIAL

## 2023-12-16 NOTE — TELEPHONE ENCOUNTER
"TO MY TEAM:   I sent Delon the Patient Portal message below. Please contact Delon to relay message or at least verify that he read and understood the message.  Help him schedule OV appointment with me or MARIJA.  --------------------------------------------------------------------------------   Dear Delon,    I received your message about your scrotal and hernia concerns. I'm sorry to hear about the discomfort you've been experiencing.    It's important that we take a closer look at what might be causing these symptoms.    I recommend that you schedule an in-office visit as soon as possible. This will allow us to conduct a thorough examination and address the issue directly. Please call our scheduling desk at [phone] and they will assist you in setting up an appointment with me or one of my partners at your earliest convenience.    If you feel that your symptoms are getting worse or if you're experiencing increased discomfort, please don't hesitate to visit an urgent care clinic for immediate attention.    Your health and comfort are our top priorities, and we want to ensure you receive the appropriate care without delay.    Warm regards,    Dr. WHYTE    --------------------------------------------------------------------------------     Comment: I suspect this is related to his messages from a couple weeks earlier:  November 16, 2023 the patient wrote, "I wanted to ask where should I go to get a ultra scan to see about a lump I found in my scrotom where it was itching a time back."  November 17, 2023 Laurie Mcwilliams MA recognized that this was a new problem that required evaluation, so she responded to Delon ARBOLEDA Jerrod, "Good morning. Please call the scheduling desk at [phone] to get assistance scheduling either a in office or virtual visit with Dr. Clark to address your concern and to ensure you are getting the care you may need. Thank you."  The patient responded, "Nevermind.  "

## 2023-12-21 ENCOUNTER — OFFICE VISIT (OUTPATIENT)
Dept: INTERNAL MEDICINE | Facility: CLINIC | Age: 58
End: 2023-12-21
Payer: COMMERCIAL

## 2023-12-21 VITALS
TEMPERATURE: 97 F | WEIGHT: 195.75 LBS | BODY MASS INDEX: 28.02 KG/M2 | DIASTOLIC BLOOD PRESSURE: 88 MMHG | OXYGEN SATURATION: 100 % | HEART RATE: 90 BPM | HEIGHT: 70 IN | SYSTOLIC BLOOD PRESSURE: 122 MMHG

## 2023-12-21 DIAGNOSIS — N50.812 LEFT TESTICULAR PAIN: Primary | ICD-10-CM

## 2023-12-21 DIAGNOSIS — F52.32 DELAYED EJACULATION: ICD-10-CM

## 2023-12-21 DIAGNOSIS — R10.32 LEFT INGUINAL PAIN: ICD-10-CM

## 2023-12-21 PROCEDURE — 1159F MED LIST DOCD IN RCRD: CPT | Mod: CPTII,S$GLB,, | Performed by: PHYSICIAN ASSISTANT

## 2023-12-21 PROCEDURE — 3066F PR DOCUMENTATION OF TREATMENT FOR NEPHROPATHY: ICD-10-PCS | Mod: CPTII,S$GLB,, | Performed by: PHYSICIAN ASSISTANT

## 2023-12-21 PROCEDURE — 3061F NEG MICROALBUMINURIA REV: CPT | Mod: CPTII,S$GLB,, | Performed by: PHYSICIAN ASSISTANT

## 2023-12-21 PROCEDURE — 3008F BODY MASS INDEX DOCD: CPT | Mod: CPTII,S$GLB,, | Performed by: PHYSICIAN ASSISTANT

## 2023-12-21 PROCEDURE — 99213 OFFICE O/P EST LOW 20 MIN: CPT | Mod: S$GLB,,, | Performed by: PHYSICIAN ASSISTANT

## 2023-12-21 PROCEDURE — 3074F SYST BP LT 130 MM HG: CPT | Mod: CPTII,S$GLB,, | Performed by: PHYSICIAN ASSISTANT

## 2023-12-21 PROCEDURE — 3079F PR MOST RECENT DIASTOLIC BLOOD PRESSURE 80-89 MM HG: ICD-10-PCS | Mod: CPTII,S$GLB,, | Performed by: PHYSICIAN ASSISTANT

## 2023-12-21 PROCEDURE — 99213 PR OFFICE/OUTPT VISIT, EST, LEVL III, 20-29 MIN: ICD-10-PCS | Mod: S$GLB,,, | Performed by: PHYSICIAN ASSISTANT

## 2023-12-21 PROCEDURE — 1160F PR REVIEW ALL MEDS BY PRESCRIBER/CLIN PHARMACIST DOCUMENTED: ICD-10-PCS | Mod: CPTII,S$GLB,, | Performed by: PHYSICIAN ASSISTANT

## 2023-12-21 PROCEDURE — 1159F PR MEDICATION LIST DOCUMENTED IN MEDICAL RECORD: ICD-10-PCS | Mod: CPTII,S$GLB,, | Performed by: PHYSICIAN ASSISTANT

## 2023-12-21 PROCEDURE — 3061F PR NEG MICROALBUMINURIA RESULT DOCUMENTED/REVIEW: ICD-10-PCS | Mod: CPTII,S$GLB,, | Performed by: PHYSICIAN ASSISTANT

## 2023-12-21 PROCEDURE — 3044F PR MOST RECENT HEMOGLOBIN A1C LEVEL <7.0%: ICD-10-PCS | Mod: CPTII,S$GLB,, | Performed by: PHYSICIAN ASSISTANT

## 2023-12-21 PROCEDURE — 99999 PR PBB SHADOW E&M-EST. PATIENT-LVL V: CPT | Mod: PBBFAC,,, | Performed by: PHYSICIAN ASSISTANT

## 2023-12-21 PROCEDURE — 99999 PR PBB SHADOW E&M-EST. PATIENT-LVL V: ICD-10-PCS | Mod: PBBFAC,,, | Performed by: PHYSICIAN ASSISTANT

## 2023-12-21 PROCEDURE — 3074F PR MOST RECENT SYSTOLIC BLOOD PRESSURE < 130 MM HG: ICD-10-PCS | Mod: CPTII,S$GLB,, | Performed by: PHYSICIAN ASSISTANT

## 2023-12-21 PROCEDURE — 3008F PR BODY MASS INDEX (BMI) DOCUMENTED: ICD-10-PCS | Mod: CPTII,S$GLB,, | Performed by: PHYSICIAN ASSISTANT

## 2023-12-21 PROCEDURE — 1160F RVW MEDS BY RX/DR IN RCRD: CPT | Mod: CPTII,S$GLB,, | Performed by: PHYSICIAN ASSISTANT

## 2023-12-21 PROCEDURE — 3079F DIAST BP 80-89 MM HG: CPT | Mod: CPTII,S$GLB,, | Performed by: PHYSICIAN ASSISTANT

## 2023-12-21 PROCEDURE — 3066F NEPHROPATHY DOC TX: CPT | Mod: CPTII,S$GLB,, | Performed by: PHYSICIAN ASSISTANT

## 2023-12-21 PROCEDURE — 3044F HG A1C LEVEL LT 7.0%: CPT | Mod: CPTII,S$GLB,, | Performed by: PHYSICIAN ASSISTANT

## 2023-12-21 NOTE — PROGRESS NOTES
Subjective:      Patient ID: Delon Elder is a 58 y.o. male.    Chief Complaint: Inguinal Hernia    Testicle Pain  The patient's primary symptoms include a genital injury, genital itching, pelvic pain, scrotal swelling and testicular pain. The patient's pertinent negatives include no genital lesions, penile discharge, penile pain or priapism. Primary symptoms comment: dog jumping on his testicles. This is a new problem. The current episode started more than 1 month ago. The problem occurs constantly. The problem has been unchanged. The pain is mild. Pertinent negatives include no abdominal pain, anorexia, chest pain, chills, constipation, coughing, diarrhea, discolored urine, dysuria, fever, flank pain, frequency, headaches, hematuria, hesitancy, joint pain, joint swelling, nausea, painful intercourse, rash, shortness of breath, sore throat, urgency, urinary retention or vomiting. Associated symptoms comments: delayed ejaculation, itching sensation radiating from groin into left scrotum. The testicular pain affects the left testicle. There is swelling in the left testicle. The color of the testicles is Normal. The symptoms are aggravated by heavy lifting and tactile pressure. He has tried nothing for the symptoms. There is no history of BPH, chlamydia, cryptorchidism, erectile aid use, erectile dysfunction, a femoral hernia, gonorrhea, herpes simplex, HIV, an inguinal hernia, kidney stones, prostatitis, sickle cell disease, syphilis or varicocele.     Uncomfortable itching sensation in left inguinal canal radiating to left scrotum. Reports swelling in left testicle. Constant ache in left inguinal area and scrotum.   Abnormal ejaculation. Delayed release. Usually a few minutes later the ejaculation happens. This problem started recently, over the past few months.   Denies any constipation or nausea/vomiting.   Brother diagnosed with prostate cancer at age 67. PSA checked this year was normal.     Patient Active  Problem List   Diagnosis    S/P coronary artery stent placement    Former smoker    Coronary artery disease involving native coronary artery of native heart without angina pectoris    MI, old    Simple chronic bronchitis    Essential hypertension    Herpes simplex infection of genitourinary system    At increased risk for hospitalization and death from COVID-19 infection due to unvaccinated state AMA    Gastroesophageal reflux disease without esophagitis    Pulmonary granuloma    Right kidney stone    Aortic calcification    Prediabetes    Androgenic alopecia    Hair transplant status, elective         Current Outpatient Medications:     albuterol (PROVENTIL HFA) 90 mcg/actuation inhaler, Inhale 2 puffs into the lungs every 6 (six) hours as needed for Wheezing. (Rescue inhaler), Disp: 18 g, Rfl: 11    aspirin 81 MG Chew, Take 81 mg by mouth., Disp: , Rfl:     atorvastatin (LIPITOR) 40 MG tablet, Take 1 tablet (40 mg total) by mouth every evening., Disp: 90 tablet, Rfl: 3    DIPHENHYDRAMINE HCL (BENADRYL ORAL), Take by mouth., Disp: , Rfl:     finasteride (PROPECIA) 1 mg tablet, Take 1 tablet (1 mg total) by mouth once daily., Disp: 90 tablet, Rfl: 3    Lactobacillus rhamnosus GG (CULTURELLE) 10 billion cell capsule, Take 1 capsule by mouth once daily., Disp: , Rfl:     metoprolol succinate (TOPROL-XL) 25 MG 24 hr tablet, Take 1 tablet (25 mg total) by mouth every evening., Disp: 90 tablet, Rfl: 3    omeprazole (PRILOSEC) 20 MG capsule, Take 1 capsule (20 mg total) by mouth once daily. Take with foot., Disp: 90 capsule, Rfl: 3    valACYclovir (VALTREX) 1000 MG tablet, Take 1 tablet (1,000 mg total) by mouth once daily. Start ASAP at onset of herpes symptoms. FIVE DOSES = FULL COURSE., Disp: 5 tablet, Rfl: 11    Review of Systems   Constitutional:  Negative for activity change, appetite change, chills, diaphoresis, fatigue, fever and unexpected weight change.   HENT: Negative.  Negative for congestion, hearing loss,  "postnasal drip, rhinorrhea, sore throat, trouble swallowing and voice change.    Eyes: Negative.  Negative for visual disturbance.   Respiratory: Negative.  Negative for cough, choking, chest tightness and shortness of breath.    Cardiovascular:  Negative for chest pain, palpitations and leg swelling.   Gastrointestinal:  Negative for abdominal distention, abdominal pain, anorexia, blood in stool, constipation, diarrhea, nausea and vomiting.   Endocrine: Negative for cold intolerance, heat intolerance, polydipsia and polyuria.   Genitourinary:  Positive for pelvic pain, scrotal swelling and testicular pain. Negative for decreased urine volume, difficulty urinating, dysuria, enuresis, flank pain, frequency, genital sores, hematuria, hesitancy, penile discharge, penile pain, penile swelling and urgency.   Musculoskeletal:  Negative for arthralgias, back pain, gait problem, joint pain, joint swelling and myalgias.   Skin:  Negative for color change, pallor, rash and wound.   Neurological:  Negative for dizziness, tremors, weakness, light-headedness, numbness and headaches.   Hematological:  Negative for adenopathy.   Psychiatric/Behavioral:  Negative for behavioral problems, confusion, self-injury, sleep disturbance and suicidal ideas. The patient is not nervous/anxious.      Objective:   /88 (BP Location: Left arm, Patient Position: Sitting, BP Method: Large (Manual))   Pulse 90   Temp 96.8 °F (36 °C) (Tympanic)   Ht 5' 10" (1.778 m)   Wt 88.8 kg (195 lb 12.3 oz)   SpO2 100%   BMI 28.09 kg/m²     Physical Exam  Vitals and nursing note reviewed. Exam conducted with a chaperone present.   Constitutional:       General: He is not in acute distress.     Appearance: Normal appearance. He is well-developed. He is not ill-appearing, toxic-appearing or diaphoretic.   HENT:      Head: Normocephalic and atraumatic.   Cardiovascular:      Rate and Rhythm: Normal rate and regular rhythm.      Heart sounds: Normal " heart sounds. No murmur heard.     No friction rub. No gallop.   Pulmonary:      Effort: Pulmonary effort is normal. No respiratory distress.      Breath sounds: Normal breath sounds. No wheezing or rales.   Abdominal:      Hernia: There is no hernia in the left inguinal area or right inguinal area.   Genitourinary:     Penis: Normal.       Testes:         Left: Tenderness present. Mass, swelling, testicular hydrocele or varicocele not present. Left testis is descended. Cremasteric reflex is present.       Epididymis:      Right: Normal. Not inflamed or enlarged. No mass or tenderness.      Left: Normal. Not inflamed or enlarged. No mass or tenderness.   Lymphadenopathy:      Lower Body: No right inguinal adenopathy. No left inguinal adenopathy.   Skin:     General: Skin is warm.      Findings: No rash.   Neurological:      Mental Status: He is alert and oriented to person, place, and time.   Psychiatric:         Mood and Affect: Mood normal.         Behavior: Behavior normal.         Thought Content: Thought content normal.         Judgment: Judgment normal.         Assessment:     1. Left testicular pain    2. Left inguinal pain    3. Delayed ejaculation      Plan:   Left testicular pain  -     US Scrotum And Testicles; Future; Expected date: 12/21/2023  -     US Abdomen Limited_Hernia; Future; Expected date: 12/21/2023  -     Ambulatory referral/consult to Urology; Future; Expected date: 12/28/2023    Left inguinal pain  -     US Abdomen Limited_Hernia; Future; Expected date: 12/21/2023    Delayed ejaculation  -     Ambulatory referral/consult to Urology; Future; Expected date: 12/28/2023      Follow up if symptoms worsen or fail to improve.

## 2023-12-22 ENCOUNTER — HOSPITAL ENCOUNTER (OUTPATIENT)
Dept: RADIOLOGY | Facility: HOSPITAL | Age: 58
Discharge: HOME | End: 2023-12-22
Attending: PHYSICIAN ASSISTANT
Payer: COMMERCIAL

## 2023-12-22 DIAGNOSIS — N50.812 LEFT TESTICULAR PAIN: ICD-10-CM

## 2023-12-22 DIAGNOSIS — R10.32 LEFT INGUINAL PAIN: ICD-10-CM

## 2023-12-22 PROCEDURE — 76705 US ABDOMEN LIMITED_HERNIA: ICD-10-PCS | Mod: 26,,, | Performed by: RADIOLOGY

## 2023-12-22 PROCEDURE — 76705 ECHO EXAM OF ABDOMEN: CPT | Mod: 26,,, | Performed by: RADIOLOGY

## 2023-12-22 PROCEDURE — 76870 US EXAM SCROTUM: CPT | Mod: TC

## 2023-12-22 PROCEDURE — 76705 ECHO EXAM OF ABDOMEN: CPT | Mod: TC

## 2023-12-22 PROCEDURE — 76870 US SCROTUM AND TESTICLES: ICD-10-PCS | Mod: 26,,, | Performed by: RADIOLOGY

## 2023-12-22 PROCEDURE — 76870 US EXAM SCROTUM: CPT | Mod: 26,,, | Performed by: RADIOLOGY

## 2024-01-09 ENCOUNTER — OFFICE VISIT (OUTPATIENT)
Dept: UROLOGY | Facility: CLINIC | Age: 59
End: 2024-01-09
Payer: COMMERCIAL

## 2024-01-09 VITALS — HEIGHT: 70 IN | WEIGHT: 195 LBS | BODY MASS INDEX: 27.92 KG/M2

## 2024-01-09 DIAGNOSIS — F52.32 DELAYED EJACULATION: ICD-10-CM

## 2024-01-09 DIAGNOSIS — N50.812 LEFT TESTICULAR PAIN: ICD-10-CM

## 2024-01-09 PROCEDURE — 99999 PR PBB SHADOW E&M-EST. PATIENT-LVL III: CPT | Mod: PBBFAC,,, | Performed by: UROLOGY

## 2024-01-09 PROCEDURE — 3008F BODY MASS INDEX DOCD: CPT | Mod: CPTII,S$GLB,, | Performed by: UROLOGY

## 2024-01-09 PROCEDURE — 1160F RVW MEDS BY RX/DR IN RCRD: CPT | Mod: CPTII,S$GLB,, | Performed by: UROLOGY

## 2024-01-09 PROCEDURE — 99204 OFFICE O/P NEW MOD 45 MIN: CPT | Mod: S$GLB,,, | Performed by: UROLOGY

## 2024-01-09 PROCEDURE — 1159F MED LIST DOCD IN RCRD: CPT | Mod: CPTII,S$GLB,, | Performed by: UROLOGY

## 2024-01-09 NOTE — PROGRESS NOTES
Chief Complaint:  Delayed ejaculation    HPI:   Delon Elder is a 58 y.o. male that presents today as a referral from LORETTA Oswald for delayed ejaculation.  Patient notes discordant ejaculation related to his orgasm for about six months.  Patient notes that he will orgasm and then quite a bit later his ejaculate will roll out.  Denies any voiding dysfunction or straining to urinate.  Denies urgency but does note increased frequency.  States that he does drink lots of fluids during the day.  Denies any gross hematuria.  Does have a family history of prostate cancer in his brother.  Also notes some testicular discomfort and wonders if his bilateral hernia repair mesh is invading his scrotum.  Scrotal ultrasound negative for concerning findings.  Denies prior urologic procedures.      PMH:  Past Medical History:   Diagnosis Date    Acute coronary syndrome     CAD (coronary artery disease) 2015    Essential hypertension     Hyperlipidemia     Simple chronic bronchitis 2015    Tear of medial meniscus of left knee, current 2018       PSH:  Past Surgical History:   Procedure Laterality Date    APPENDECTOMY      CARDIAC CATHETERIZATION      COLONOSCOPY N/A 2023    Procedure: COLONOSCOPY;  Surgeon: Saul Worley MD;  Location: [facility];  Service: Endoscopy;  Laterality: N/A;    CORONARY ANGIOPLASTY         Family History:  Family History   Problem Relation Age of Onset    Hyperlipidemia Mother     Hyperlipidemia Father     Melanoma Neg Hx     Psoriasis Neg Hx     Lupus Neg Hx        Social History:  Social History     Tobacco Use    Smoking status: Former     Current packs/day: 0.00     Average packs/day: 1.5 packs/day for 30.0 years (45.0 ttl pk-yrs)     Types: Cigarettes     Start date: 1980     Quit date: 2010     Years since quittin.7    Smokeless tobacco: Never   Substance Use Topics    Alcohol use: Not Currently     Comment: 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  Musculoskeletal: No joint stiffness or swelling. Denies back pain.  : see HPI  Neuro: no dizziness or weakness    Allergies:  Sulfa (sulfonamide antibiotics)    Medications:    Current Outpatient Medications:     albuterol (PROVENTIL HFA) 90 mcg/actuation inhaler, Inhale 2 puffs into the lungs every 6 (six) hours as needed for Wheezing. (Rescue inhaler), Disp: 18 g, Rfl: 11    aspirin 81 MG Chew, Take 81 mg by mouth., Disp: , Rfl:     atorvastatin (LIPITOR) 40 MG tablet, Take 1 tablet (40 mg total) by mouth every evening., Disp: 90 tablet, Rfl: 3    DIPHENHYDRAMINE HCL (BENADRYL ORAL), Take by mouth., Disp: , Rfl:     finasteride (PROPECIA) 1 mg tablet, Take 1 tablet (1 mg total) by mouth once daily., Disp: 90 tablet, Rfl: 3    Lactobacillus rhamnosus GG (CULTURELLE) 10 billion cell capsule, Take 1 capsule by mouth once daily., Disp: , Rfl:     metoprolol succinate (TOPROL-XL) 25 MG 24 hr tablet, Take 1 tablet (25 mg total) by mouth every evening., Disp: 90 tablet, Rfl: 3    omeprazole (PRILOSEC) 20 MG capsule, Take 1 capsule (20 mg total) by mouth once daily. Take with foot., Disp: 90 capsule, Rfl: 3    valACYclovir (VALTREX) 1000 MG tablet, Take 1 tablet (1,000 mg total) by mouth once daily. Start ASAP at onset of herpes symptoms. FIVE DOSES = FULL COURSE., Disp: 5 tablet, Rfl: 11    Physical Exam:  There were no vitals filed for this visit.  Body mass index is 27.98 kg/m².  General: awake, alert, cooperative  Head: NC/AT  Ears: external ears normal  Eyes: sclera normal  Lungs: normal inspiration, NAD  Heart: well-perfused  Abdomen: Soft, NT, ND  : Normal circ'd phallus, meatus normal in  size and position, BL testicles palpable, no masses, nontender, right epididymal cyst noted  Lymphatic: groin nodes negative  Skin: The skin is warm and dry  Ext: No c/c/e.  Neuro: grossly intact, AOx3    RADIOLOGY:  US SCROTUM AND TESTICLES 12/22/2023     CLINICAL HISTORY:  Left testicular pain     COMPARISON:  None     FINDINGS:  Scrotal ultrasound performed.     The testicles are symmetric in echogenicity and size.     The right testicle measures 5.1 x 2.7 x 2.7 cm.  No testicular masses.  The epididymis is unremarkable.  Small hydrocele.  No varicocele.  Arterial and venous flow demonstrated.  No increased flow identified.     The left testicle measures 4.7 x 2.2 x 3.7 cm.  No testicular masses.  The epididymis is unremarkable.   Small hydrocele.  No varicocele.  Arterial and venous flow demonstrated.  No increased flow identified.     Impression:     Small bilateral hydroceles.    LABS:  I personally reviewed the following lab values:  Lab Results   Component Value Date    WBC 6.06 05/16/2023    HGB 14.5 05/16/2023    HCT 44.2 05/16/2023     05/16/2023     05/16/2023    K 4.9 05/16/2023     05/16/2023    CREATININE 0.8 05/16/2023    BUN 11 05/16/2023    CO2 29 05/16/2023    PSA 0.53 02/07/2023    INR 0.9 03/22/2015    HGBA1C 5.7 (H) 05/16/2023    CHOL 173 02/07/2023    TRIG 114 02/07/2023    HDL 39 (L) 02/07/2023    ALT 20 02/07/2023    AST 19 02/07/2023     Assessment/Plan:   Delon Elder is a 58 y.o. male with:    Delayed Ejaculation - f/u for cysto to r/o urethral stricture    Orchalgia - PRN NSAIDs    Frequency - likely physiologic    Thank you for allowing me the opportunity to participate in this patient's care.     Sekou Au MD  Urology

## 2024-02-13 ENCOUNTER — PROCEDURE VISIT (OUTPATIENT)
Dept: UROLOGY | Facility: CLINIC | Age: 59
End: 2024-02-13
Payer: COMMERCIAL

## 2024-02-13 VITALS
SYSTOLIC BLOOD PRESSURE: 135 MMHG | RESPIRATION RATE: 18 BRPM | BODY MASS INDEX: 29.1 KG/M2 | HEIGHT: 70 IN | DIASTOLIC BLOOD PRESSURE: 91 MMHG | WEIGHT: 203.25 LBS

## 2024-02-13 DIAGNOSIS — F52.32 DELAYED EJACULATION: Primary | ICD-10-CM

## 2024-02-13 PROCEDURE — 52000 CYSTOURETHROSCOPY: CPT | Mod: S$GLB,,, | Performed by: UROLOGY

## 2024-02-13 NOTE — PROCEDURES
Procedures    Procedure:  Diagnostic Cystoscopy    Indications: delayed/discordant ejaculation    UA: normal, see lab results for values    Procedure in Detail: After proper consents were obtained, the patient was prepped and draped in normal sterile fashion for diagnostic cystoscopy. 5 ml of lidocaine jelly was instilled in the urethra. The flexible cystoscope was then introduced into the urethra, and advanced into the bladder under direct vision. The urethral mucosa appeared normal, and no strictures were noted. The sphincter was normal, and the veru montanum was unremarkable. The prostatic mucosa and the lateral lobes of the prostate were unremarkable, with incomplete visual obstruction. The bladder neck was normal. Inspection of the interior of the bladder was then carried out. The trigone was unremarkable, with no mucosal lesions. The ureteral orifices were normal in position and configuration. Systematic inspection of the mucosa of the bladder it was then carried out, rotating the cystoscope so that all areas of the left and right lateral walls, the dome of the bladder, and the posterior wall were all visualized. The cystoscope was then advanced further into the bladder, and maximum deflection of the scope was performed so that the bladder neck could be inspected. No mucosal lesions were noted there. The cystoscope was then removed, and the procedure terminated. Patient tolerated the procedure well. No complications.     Findings: normal urethra, prostate, and bladder    Disposition:  - negative work-up for urethral strictures  - continue finasteride  - f/u 6 months    Sekou Au MD

## 2024-02-16 ENCOUNTER — LAB VISIT (OUTPATIENT)
Dept: LAB | Facility: HOSPITAL | Age: 59
End: 2024-02-16
Attending: FAMILY MEDICINE
Payer: COMMERCIAL

## 2024-02-16 DIAGNOSIS — Z12.5 SCREENING PSA (PROSTATE SPECIFIC ANTIGEN): ICD-10-CM

## 2024-02-16 DIAGNOSIS — I10 ESSENTIAL HYPERTENSION: ICD-10-CM

## 2024-02-16 DIAGNOSIS — R73.03 PREDIABETES: Chronic | ICD-10-CM

## 2024-02-16 DIAGNOSIS — Z00.00 PREVENTATIVE HEALTH CARE: ICD-10-CM

## 2024-02-16 LAB
ALBUMIN SERPL BCP-MCNC: 4.1 G/DL (ref 3.5–5.2)
ALP SERPL-CCNC: 77 U/L (ref 55–135)
ALT SERPL W/O P-5'-P-CCNC: 19 U/L (ref 10–44)
ANION GAP SERPL CALC-SCNC: 12 MMOL/L (ref 8–16)
AST SERPL-CCNC: 19 U/L (ref 10–40)
BILIRUB SERPL-MCNC: 0.5 MG/DL (ref 0.1–1)
BUN SERPL-MCNC: 8 MG/DL (ref 6–20)
CALCIUM SERPL-MCNC: 9.4 MG/DL (ref 8.7–10.5)
CHLORIDE SERPL-SCNC: 108 MMOL/L (ref 95–110)
CHOLEST SERPL-MCNC: 212 MG/DL (ref 120–199)
CHOLEST/HDLC SERPL: 5.3 {RATIO} (ref 2–5)
CO2 SERPL-SCNC: 21 MMOL/L (ref 23–29)
COMPLEXED PSA SERPL-MCNC: 0.34 NG/ML (ref 0–4)
CREAT SERPL-MCNC: 0.9 MG/DL (ref 0.5–1.4)
EST. GFR  (NO RACE VARIABLE): >60 ML/MIN/1.73 M^2
ESTIMATED AVG GLUCOSE: 123 MG/DL (ref 68–131)
GLUCOSE SERPL-MCNC: 113 MG/DL (ref 70–110)
HBA1C MFR BLD: 5.9 % (ref 4–5.6)
HDLC SERPL-MCNC: 40 MG/DL (ref 40–75)
HDLC SERPL: 18.9 % (ref 20–50)
LDLC SERPL CALC-MCNC: 143.2 MG/DL (ref 63–159)
NONHDLC SERPL-MCNC: 172 MG/DL
POTASSIUM SERPL-SCNC: 4.6 MMOL/L (ref 3.5–5.1)
PROT SERPL-MCNC: 6.9 G/DL (ref 6–8.4)
SODIUM SERPL-SCNC: 141 MMOL/L (ref 136–145)
TRIGL SERPL-MCNC: 144 MG/DL (ref 30–150)

## 2024-02-16 PROCEDURE — 80053 COMPREHEN METABOLIC PANEL: CPT | Performed by: FAMILY MEDICINE

## 2024-02-16 PROCEDURE — 80061 LIPID PANEL: CPT | Performed by: FAMILY MEDICINE

## 2024-02-16 PROCEDURE — 84153 ASSAY OF PSA TOTAL: CPT | Performed by: FAMILY MEDICINE

## 2024-02-16 PROCEDURE — 83036 HEMOGLOBIN GLYCOSYLATED A1C: CPT | Performed by: FAMILY MEDICINE

## 2024-02-16 PROCEDURE — 36415 COLL VENOUS BLD VENIPUNCTURE: CPT | Performed by: FAMILY MEDICINE

## 2024-02-23 ENCOUNTER — PATIENT MESSAGE (OUTPATIENT)
Dept: INTERNAL MEDICINE | Facility: CLINIC | Age: 59
End: 2024-02-23

## 2024-02-23 ENCOUNTER — OFFICE VISIT (OUTPATIENT)
Dept: INTERNAL MEDICINE | Facility: CLINIC | Age: 59
End: 2024-02-23
Payer: COMMERCIAL

## 2024-02-23 VITALS
TEMPERATURE: 97 F | WEIGHT: 200.63 LBS | HEART RATE: 66 BPM | HEIGHT: 70 IN | DIASTOLIC BLOOD PRESSURE: 78 MMHG | SYSTOLIC BLOOD PRESSURE: 130 MMHG | RESPIRATION RATE: 18 BRPM | OXYGEN SATURATION: 96 % | BODY MASS INDEX: 28.72 KG/M2

## 2024-02-23 DIAGNOSIS — R73.03 PREDIABETES: Chronic | ICD-10-CM

## 2024-02-23 DIAGNOSIS — I10 ESSENTIAL HYPERTENSION: ICD-10-CM

## 2024-02-23 DIAGNOSIS — I25.10 CORONARY ARTERY DISEASE INVOLVING NATIVE CORONARY ARTERY OF NATIVE HEART WITHOUT ANGINA PECTORIS: Chronic | ICD-10-CM

## 2024-02-23 DIAGNOSIS — E66.3 OVERWEIGHT (BMI 25.0-29.9): Primary | ICD-10-CM

## 2024-02-23 DIAGNOSIS — Z87.891 FORMER SMOKER: Chronic | ICD-10-CM

## 2024-02-23 DIAGNOSIS — K21.9 GASTROESOPHAGEAL REFLUX DISEASE WITHOUT ESOPHAGITIS: Chronic | ICD-10-CM

## 2024-02-23 DIAGNOSIS — L64.9 ANDROGENIC ALOPECIA: ICD-10-CM

## 2024-02-23 DIAGNOSIS — Z41.8: ICD-10-CM

## 2024-02-23 DIAGNOSIS — I70.0 AORTIC CALCIFICATION: Chronic | ICD-10-CM

## 2024-02-23 DIAGNOSIS — E78.2 MIXED HYPERLIPIDEMIA: ICD-10-CM

## 2024-02-23 DIAGNOSIS — Z12.2 ENCOUNTER FOR SCREENING FOR LUNG CANCER: ICD-10-CM

## 2024-02-23 DIAGNOSIS — A60.00 HERPES SIMPLEX INFECTION OF GENITOURINARY SYSTEM: Chronic | ICD-10-CM

## 2024-02-23 DIAGNOSIS — J41.0 SIMPLE CHRONIC BRONCHITIS: Chronic | ICD-10-CM

## 2024-02-23 PROBLEM — Z28.21 IMMUNIZATION NOT CARRIED OUT BECAUSE OF PATIENT REFUSAL: Status: RESOLVED | Noted: 2022-02-04 | Resolved: 2024-02-23

## 2024-02-23 PROCEDURE — 3078F DIAST BP <80 MM HG: CPT | Mod: CPTII,S$GLB,, | Performed by: FAMILY MEDICINE

## 2024-02-23 PROCEDURE — 99999 PR PBB SHADOW E&M-EST. PATIENT-LVL IV: CPT | Mod: PBBFAC,,, | Performed by: FAMILY MEDICINE

## 2024-02-23 PROCEDURE — 1159F MED LIST DOCD IN RCRD: CPT | Mod: CPTII,S$GLB,, | Performed by: FAMILY MEDICINE

## 2024-02-23 PROCEDURE — 3008F BODY MASS INDEX DOCD: CPT | Mod: CPTII,S$GLB,, | Performed by: FAMILY MEDICINE

## 2024-02-23 PROCEDURE — 99214 OFFICE O/P EST MOD 30 MIN: CPT | Mod: S$GLB,,, | Performed by: FAMILY MEDICINE

## 2024-02-23 PROCEDURE — 3044F HG A1C LEVEL LT 7.0%: CPT | Mod: CPTII,S$GLB,, | Performed by: FAMILY MEDICINE

## 2024-02-23 PROCEDURE — 1160F RVW MEDS BY RX/DR IN RCRD: CPT | Mod: CPTII,S$GLB,, | Performed by: FAMILY MEDICINE

## 2024-02-23 PROCEDURE — 3075F SYST BP GE 130 - 139MM HG: CPT | Mod: CPTII,S$GLB,, | Performed by: FAMILY MEDICINE

## 2024-02-23 RX ORDER — FINASTERIDE 1 MG/1
1 TABLET, FILM COATED ORAL DAILY
Qty: 90 TABLET | Refills: 3 | Status: SHIPPED | OUTPATIENT
Start: 2024-02-23 | End: 2024-04-28 | Stop reason: SDUPTHER

## 2024-02-23 RX ORDER — ALBUTEROL SULFATE 90 UG/1
2 AEROSOL, METERED RESPIRATORY (INHALATION) EVERY 6 HOURS PRN
Qty: 18 G | Refills: 11 | Status: SHIPPED | OUTPATIENT
Start: 2024-02-23 | End: 2025-02-22

## 2024-02-23 RX ORDER — METOPROLOL SUCCINATE 25 MG/1
25 TABLET, EXTENDED RELEASE ORAL NIGHTLY
Qty: 90 TABLET | Refills: 3 | Status: SHIPPED | OUTPATIENT
Start: 2024-02-23 | End: 2024-04-16 | Stop reason: SDUPTHER

## 2024-02-23 RX ORDER — OMEPRAZOLE 20 MG/1
20 CAPSULE, DELAYED RELEASE ORAL DAILY
Qty: 90 CAPSULE | Refills: 3 | Status: SHIPPED | OUTPATIENT
Start: 2024-02-23 | End: 2024-06-05 | Stop reason: SDUPTHER

## 2024-02-23 RX ORDER — ASPIRIN 81 MG/1
81 TABLET ORAL DAILY
COMMUNITY
Start: 2024-02-23

## 2024-02-23 RX ORDER — ROSUVASTATIN CALCIUM 40 MG/1
40 TABLET, COATED ORAL NIGHTLY
Qty: 90 TABLET | Refills: 3 | Status: SHIPPED | OUTPATIENT
Start: 2024-02-23 | End: 2024-06-05 | Stop reason: SDUPTHER

## 2024-02-23 RX ORDER — VALACYCLOVIR HYDROCHLORIDE 1 G/1
1000 TABLET, FILM COATED ORAL DAILY
Qty: 5 TABLET | Refills: 11 | Status: SHIPPED | OUTPATIENT
Start: 2024-02-23

## 2024-02-23 NOTE — ASSESSMENT & PLAN NOTE
"Estimated body mass index is 28.79 kg/m² as calculated from the following:    Height as of this encounter: 5' 10" (1.778 m).    Weight as of this encounter: 91 kg (200 lb 9.9 oz).   "

## 2024-02-23 NOTE — PROGRESS NOTES
OFFICE VISIT 2/23/24  8:40 AM CST    History of Present Illness    Delon presents today for a follow-up and to discuss his concerns about weight gain.    The patient has gained almost 16 lbs over the last nine months and is concerned about being moderately overweight. He has interest in intermittent fasting as a method for weight management. He experiences stress-induced cravings for foods like ice cream, corn dogs, and Cheetos when staying at his mother's home.    The patient has abstained from drugs and alcohol for 13 years and quit smoking in 2010. 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 He also does yard work at his home, trimming tree limbs, and reports discomfort from pollen exposure during these activities.    The patient lives with chronic bronchitis but has not needed his albuterol recently. He also experiences chest discomfort, which he attributes to gas from his colon. This is a lifelong condition that can sometimes cause significant pain but is not exacerbated by exertion. He also endorses symptoms of heartburn when he discontinues his omeprazole for extended periods.    The patient's recent lab results indicate elevated A1C level, signifying higher average blood sugar over the past three months and a shift towards prediabetes. His cholesterol levels also indicated an increase.    His medication regimen includes finasteride (Propecia), atorvastatin, metoprolol, and albuterol, all of which are well-tolerated. There was a lapsing in his omeprazole medication for acid reflux. He also reported taking Enteric coated aspirin 81 mg as an OTC medication.    The patient had a hair transplant and is scheduled for a follow up in May.  ROS:  ROS is negative unless otherwise indicated in HPI.         Assessment & Plan    PREDIABETES AND WEIGHT MANAGEMENT:   Addressed the patient's  "weight gain and prediabetes condition by referring him to a lifestyle wellness and weight management program and advising dietary modifications.   Educated the patient about the importance of these lifestyle changes in managing his health issues.  HYPERLIPIDEMIA:   Increased the patient's cholesterol treatment due to elevated non-HDL cholesterol levels.   Switched the patient's cholesterol medication from atorvastatin to rosuvastatin 40mg, to be taken at bedtime.  LUNG CANCER SCREENING:   Ordered a low-dose CT for lung cancer screening, indicating that this should be the patient's final one if he remains cigarette-free for another year.  CARDIOVASCULAR PROBLEMS:   Advised the patient to consult his cardiologist regarding the chest discomfort he has been experiencing.  GASTROESOPHAGEAL REFLUX DISEASE (GERD):   Renewed the patient's prescription for omeprazole for his acid reflux.  LEGAL ADVICE:   Informed the patient about the legality of video calls while driving.  ALLERGIC REACTION TO POLLEN:   Suggested the patient wear a mask when cutting down tree limbs due to his reaction to pollen.  SEXUAL HEALTH:   Discussed the patient's issue with ejaculation and advised him to consult his urologist.  FOLLOW-UP:   Scheduled a follow-up visit in 1 year unless the patient needs to see the doctor before then.       1. Overweight (BMI 25.0-29.9)  Assessment & Plan:  Estimated body mass index is 28.79 kg/m² as calculated from the following:    Height as of this encounter: 5' 10" (1.778 m).    Weight as of this encounter: 91 kg (200 lb 9.9 oz).     Orders:  -     Ambulatory referral/consult to [facility]; Future; Expected date: 03/01/2024    2. Former smoker  Overview:  Quit in 2010 after 45 pack-year smoking history.    Orders:  -     CT Chest Lung Screening Low Dose; Future; Expected date: 02/23/2024    3. Encounter for screening for lung cancer  -     CT Chest Lung Screening Low Dose; Future; Expected date: " 02/23/2024    4. Prediabetes  -     Ambulatory referral/consult to [facility]; Future; Expected date: 03/01/2024    5. Gastroesophageal reflux disease without esophagitis  -     omeprazole (PRILOSEC) 20 MG capsule; Take 1 capsule (20 mg total) by mouth once daily. Take with foot.  Dispense: 90 capsule; Refill: 3    6. Coronary artery disease involving native coronary artery of native heart without angina pectoris  -     rosuvastatin (CRESTOR) 40 MG Tab; Take 1 tablet (40 mg total) by mouth every evening.  Dispense: 90 tablet; Refill: 3  -     aspirin (ECOTRIN) 81 MG EC tablet; Take 1 tablet (81 mg total) by mouth once daily.    7. Aortic calcification  Overview:  CT Chest 2/17/2023 Findings  Coronary artery calcifications  Aortic calcifications without aneurysmal development    Orders:  -     rosuvastatin (CRESTOR) 40 MG Tab; Take 1 tablet (40 mg total) by mouth every evening.  Dispense: 90 tablet; Refill: 3    8. Simple chronic bronchitis  -     albuterol (PROVENTIL HFA) 90 mcg/actuation inhaler; Inhale 2 puffs into the lungs every 6 (six) hours as needed for Wheezing. (Rescue inhaler)  Dispense: 18 g; Refill: 11    9. Mixed hyperlipidemia  Assessment & Plan:  Lab Results   Component Value Date    CHOL 212 (H) 02/16/2024    CHOL 173 02/07/2023    TRIG 144 02/16/2024    TRIG 114 02/07/2023    HDL 40 02/16/2024    HDL 39 (L) 02/07/2023    LDLCALC 143.2 02/16/2024    LDLCALC 111.2 02/07/2023    NONHDLCHOL 172 02/16/2024    NONHDLCHOL 134 02/07/2023    AST 19 02/16/2024    ALT 19 02/16/2024       Orders:  -     rosuvastatin (CRESTOR) 40 MG Tab; Take 1 tablet (40 mg total) by mouth every evening.  Dispense: 90 tablet; Refill: 3    10. Hair transplant status, elective  -     finasteride (PROPECIA) 1 mg tablet; Take 1 tablet (1 mg total) by mouth once daily.  Dispense: 90 tablet; Refill: 3    11. Androgenic alopecia  -     finasteride (PROPECIA) 1 mg tablet; Take 1 tablet (1 mg total) by mouth once daily.  " Dispense: 90 tablet; Refill: 3    12. Essential hypertension  -     metoprolol succinate (TOPROL-XL) 25 MG 24 hr tablet; Take 1 tablet (25 mg total) by mouth every evening.  Dispense: 90 tablet; Refill: 3    13. Herpes simplex infection of genitourinary system  -     valACYclovir (VALTREX) 1000 MG tablet; Take 1 tablet (1,000 mg total) by mouth once daily. Start ASAP at onset of herpes symptoms. FIVE DOSES = FULL COURSE.  Dispense: 5 tablet; Refill: 11       Unless noted herein, any chronic conditions are represented as and appear stable, and no other significant complaints or concerns were reported.    Vitals:    02/23/24 0837   BP: 130/78   BP Location: Left arm   Patient Position: Sitting   BP Method: Large (Manual)   Pulse: 66   Resp: 18   Temp: 97.3 °F (36.3 °C)   SpO2: 96%   Weight: 91 kg (200 lb 9.9 oz)   Height: 5' 10" (1.778 m)     Physical Exam    Constitutional: No acute distress. Normal appearance. Not ill-appearing.  Pulmonary: Pulmonary effort is normal. No respiratory distress. Lungs clear.  Skin: Skin is not jaundiced.  Neurological: Alert. Mental status is at baseline.  Psychiatric: Mood normal. Behavior normal. Thought content normal.  Cardiovascular: Heart rate normal.         This note was generated with the assistance of ambient listening technology. Verbal consent was obtained by the patient and accompanying visitor(s) for the recording of patient appointment to facilitate this note. I attest to having reviewed and edited the generated note for accuracy, though some syntax or spelling errors may persist. Please contact the author of this note for any clarification.    Documentation entered by me for this encounter may have been done in part using speech-recognition technology. Although I have made an effort to ensure accuracy, "sound like" errors may exist and should be interpreted in context.   "

## 2024-02-23 NOTE — ASSESSMENT & PLAN NOTE
Lab Results   Component Value Date    CHOL 212 (H) 02/16/2024    CHOL 173 02/07/2023    TRIG 144 02/16/2024    TRIG 114 02/07/2023    HDL 40 02/16/2024    HDL 39 (L) 02/07/2023    LDLCALC 143.2 02/16/2024    LDLCALC 111.2 02/07/2023    NONHDLCHOL 172 02/16/2024    NONHDLCHOL 134 02/07/2023    AST 19 02/16/2024    ALT 19 02/16/2024

## 2024-04-16 ENCOUNTER — PATIENT MESSAGE (OUTPATIENT)
Dept: ADMINISTRATIVE | Facility: OTHER | Age: 59
End: 2024-04-16
Payer: COMMERCIAL

## 2024-04-16 DIAGNOSIS — I10 ESSENTIAL HYPERTENSION: ICD-10-CM

## 2024-04-16 RX ORDER — METOPROLOL SUCCINATE 25 MG/1
25 TABLET, EXTENDED RELEASE ORAL NIGHTLY
Qty: 90 TABLET | Refills: 3 | Status: SHIPPED | OUTPATIENT
Start: 2024-04-16 | End: 2024-06-05 | Stop reason: SDUPTHER

## 2024-04-16 NOTE — TELEPHONE ENCOUNTER
Provider Staff:  Action required for this patient    Requires labs      Please see care gap opportunities below in Care Due Message.    Thanks!  [facility]     Appointments      Date Provider   Last Visit   2/23/2024 GERRY Clark MD   Next Visit   Visit date not found GERRY Clark MD     Refill Decision Note   Delon Elder  is requesting a refill authorization.  Brief Assessment and Rationale for Refill:  Approve     Medication Therapy Plan:        Comments:     Note composed:11:37 AM 04/16/2024

## 2024-04-16 NOTE — TELEPHONE ENCOUNTER
Care Due:                  Date            Visit Type   Department     Provider  --------------------------------------------------------------------------------                                EP -                              PRIMARY      [facility] INTER[facility]  Last Visit: 02-      CARE ([facility])   MEDICINE       Henrik Clark  Next Visit: None Scheduled  None         None Found                                                            Last  Test          Frequency    Reason                     Performed    Due Date  --------------------------------------------------------------------------------    CBC.........  12 months..  valACYclovir.............  05-   05-    [facility] Embedded Care Due Messages. Reference number: [id number].   4/16/2024 10:57:04 AM CDT

## 2024-04-28 DIAGNOSIS — Z41.8: ICD-10-CM

## 2024-04-28 DIAGNOSIS — L64.9 ANDROGENIC ALOPECIA: ICD-10-CM

## 2024-04-28 NOTE — TELEPHONE ENCOUNTER
No care due was identified.  [facility] Embedded Care Due Messages. Reference number: [id number].   4/28/2024 9:18:15 AM CDT

## 2024-04-29 RX ORDER — FINASTERIDE 1 MG/1
1 TABLET, FILM COATED ORAL DAILY
Qty: 90 TABLET | Refills: 3 | Status: SHIPPED | OUTPATIENT
Start: 2024-04-29 | End: 2024-06-05 | Stop reason: SDUPTHER

## 2024-04-29 NOTE — TELEPHONE ENCOUNTER
Refill Decision Note   Delon Elder  is requesting a refill authorization.  Brief Assessment and Rationale for Refill:  Approve     Medication Therapy Plan:         Comments:     Note composed:1:44 PM 04/29/2024

## 2024-06-05 ENCOUNTER — LAB VISIT (OUTPATIENT)
Dept: LAB | Facility: HOSPITAL | Age: 59
End: 2024-06-05
Attending: FAMILY MEDICINE
Payer: COMMERCIAL

## 2024-06-05 ENCOUNTER — OFFICE VISIT (OUTPATIENT)
Dept: INTERNAL MEDICINE | Facility: CLINIC | Age: 59
End: 2024-06-05
Payer: COMMERCIAL

## 2024-06-05 ENCOUNTER — TELEPHONE (OUTPATIENT)
Dept: INTERNAL MEDICINE | Facility: CLINIC | Age: 59
End: 2024-06-05

## 2024-06-05 DIAGNOSIS — R10.9 LEFT FLANK PAIN: Primary | ICD-10-CM

## 2024-06-05 DIAGNOSIS — I25.10 CORONARY ARTERY DISEASE INVOLVING NATIVE CORONARY ARTERY OF NATIVE HEART WITHOUT ANGINA PECTORIS: Chronic | ICD-10-CM

## 2024-06-05 DIAGNOSIS — Z41.8: ICD-10-CM

## 2024-06-05 DIAGNOSIS — L64.9 ANDROGENIC ALOPECIA: ICD-10-CM

## 2024-06-05 DIAGNOSIS — I10 ESSENTIAL HYPERTENSION: ICD-10-CM

## 2024-06-05 DIAGNOSIS — Z87.442 HISTORY OF KIDNEY STONES: ICD-10-CM

## 2024-06-05 DIAGNOSIS — E78.2 MIXED HYPERLIPIDEMIA: ICD-10-CM

## 2024-06-05 DIAGNOSIS — K21.9 GASTROESOPHAGEAL REFLUX DISEASE WITHOUT ESOPHAGITIS: Chronic | ICD-10-CM

## 2024-06-05 DIAGNOSIS — I70.0 AORTIC CALCIFICATION: Chronic | ICD-10-CM

## 2024-06-05 DIAGNOSIS — R10.9 LEFT FLANK PAIN: ICD-10-CM

## 2024-06-05 LAB
BASOPHILS # BLD AUTO: 0.06 K/UL (ref 0–0.2)
BASOPHILS NFR BLD: 1 % (ref 0–1.9)
BILIRUB UR QL STRIP: NEGATIVE
CLARITY UR: CLEAR
COLOR UR: YELLOW
CREAT SERPL-MCNC: 1 MG/DL (ref 0.5–1.4)
DIFFERENTIAL METHOD BLD: ABNORMAL
EOSINOPHIL # BLD AUTO: 0.3 K/UL (ref 0–0.5)
EOSINOPHIL NFR BLD: 4.9 % (ref 0–8)
ERYTHROCYTE [DISTWIDTH] IN BLOOD BY AUTOMATED COUNT: 12.3 % (ref 11.5–14.5)
EST. GFR  (NO RACE VARIABLE): >60 ML/MIN/1.73 M^2
GLUCOSE UR QL STRIP: NEGATIVE
HCT VFR BLD AUTO: 42.8 % (ref 40–54)
HGB BLD-MCNC: 14.2 G/DL (ref 14–18)
HGB UR QL STRIP: NEGATIVE
IMM GRANULOCYTES # BLD AUTO: 0.01 K/UL (ref 0–0.04)
IMM GRANULOCYTES NFR BLD AUTO: 0.2 % (ref 0–0.5)
KETONES UR QL STRIP: NEGATIVE
LEUKOCYTE ESTERASE UR QL STRIP: NEGATIVE
LYMPHOCYTES # BLD AUTO: 1.4 K/UL (ref 1–4.8)
LYMPHOCYTES NFR BLD: 24.2 % (ref 18–48)
MCH RBC QN AUTO: 31.3 PG (ref 27–31)
MCHC RBC AUTO-ENTMCNC: 33.2 G/DL (ref 32–36)
MCV RBC AUTO: 95 FL (ref 82–98)
MONOCYTES # BLD AUTO: 0.5 K/UL (ref 0.3–1)
MONOCYTES NFR BLD: 9 % (ref 4–15)
NEUTROPHILS # BLD AUTO: 3.6 K/UL (ref 1.8–7.7)
NEUTROPHILS NFR BLD: 60.7 % (ref 38–73)
NITRITE UR QL STRIP: NEGATIVE
NRBC BLD-RTO: 0 /100 WBC
PH UR STRIP: 7 [PH] (ref 5–8)
PLATELET # BLD AUTO: 234 K/UL (ref 150–450)
PMV BLD AUTO: 10 FL (ref 9.2–12.9)
PROT UR QL STRIP: NEGATIVE
RBC # BLD AUTO: 4.53 M/UL (ref 4.6–6.2)
SP GR UR STRIP: 1.02 (ref 1–1.03)
URN SPEC COLLECT METH UR: NORMAL
WBC # BLD AUTO: 5.92 K/UL (ref 3.9–12.7)

## 2024-06-05 PROCEDURE — 1159F MED LIST DOCD IN RCRD: CPT | Mod: CPTII,95,, | Performed by: FAMILY MEDICINE

## 2024-06-05 PROCEDURE — 99214 OFFICE O/P EST MOD 30 MIN: CPT | Mod: 95,,, | Performed by: FAMILY MEDICINE

## 2024-06-05 PROCEDURE — 81003 URINALYSIS AUTO W/O SCOPE: CPT | Performed by: FAMILY MEDICINE

## 2024-06-05 PROCEDURE — 82565 ASSAY OF CREATININE: CPT | Performed by: FAMILY MEDICINE

## 2024-06-05 PROCEDURE — 36415 COLL VENOUS BLD VENIPUNCTURE: CPT | Performed by: FAMILY MEDICINE

## 2024-06-05 PROCEDURE — G2211 COMPLEX E/M VISIT ADD ON: HCPCS | Mod: 95,,, | Performed by: FAMILY MEDICINE

## 2024-06-05 PROCEDURE — 1160F RVW MEDS BY RX/DR IN RCRD: CPT | Mod: CPTII,95,, | Performed by: FAMILY MEDICINE

## 2024-06-05 PROCEDURE — 85025 COMPLETE CBC W/AUTO DIFF WBC: CPT | Performed by: FAMILY MEDICINE

## 2024-06-05 PROCEDURE — 3044F HG A1C LEVEL LT 7.0%: CPT | Mod: CPTII,95,, | Performed by: FAMILY MEDICINE

## 2024-06-05 RX ORDER — OMEPRAZOLE 20 MG/1
20 CAPSULE, DELAYED RELEASE ORAL EVERY MORNING
Qty: 90 CAPSULE | Refills: 3 | Status: SHIPPED | OUTPATIENT
Start: 2024-06-05

## 2024-06-05 RX ORDER — ROSUVASTATIN CALCIUM 40 MG/1
40 TABLET, COATED ORAL NIGHTLY
Qty: 90 TABLET | Refills: 3 | Status: SHIPPED | OUTPATIENT
Start: 2024-06-05

## 2024-06-05 RX ORDER — FINASTERIDE 1 MG/1
1 TABLET, FILM COATED ORAL DAILY
Qty: 90 TABLET | Refills: 3 | Status: SHIPPED | OUTPATIENT
Start: 2024-06-05

## 2024-06-05 RX ORDER — METOPROLOL SUCCINATE 25 MG/1
25 TABLET, EXTENDED RELEASE ORAL NIGHTLY
Qty: 90 TABLET | Refills: 3 | Status: SHIPPED | OUTPATIENT
Start: 2024-06-05 | End: 2025-06-05

## 2024-06-05 NOTE — PROGRESS NOTES
TELEMEDICINE VIRTUAL VIDEO VISIT  6/5/24  7:20 AM CDT    Visit Type: Audiovisual    Patient's Location: Delon represents that they are located within the state [facility].    History of Present Illness    Delon presents today for lower back pain.    Delon reports experiencing sharp pain in his lower back, which worsens after prolonged periods of sitting, such as during long car rides. The pain is localized and does not radiate to other areas.    Delon expresses concerns about changes in his ejaculation and reported an unusual odor in his urine two days prior. He has a history of kidney stones, with the last occurrence in 1995. Recently, he has increased his water intake significantly over the past three days. He has been using AZO for symptoms of a urinary tract infection.    Delon reports being on metoprolol, finasteride, omeprazole, and rosuvastatin without any issues. He has been taking Aleve for pain management.       Review of Systems   Gastrointestinal:  Negative for abdominal pain and blood in stool.   Genitourinary:  Negative for difficulty urinating, discharge, dysuria, frequency, hematuria, penile pain and testicular pain.   Musculoskeletal:  Positive for back pain.       Assessment & Plan    N39.0 URINARY TRACT INFECTION, SITE NOT SPECIFIED:   Patient reports a sharp, constant pain in the lower back and an unusual odor during urination 2 days ago.   However, the patient is currently urinating fine and has been drinking water every 3-4 hours.   The patient has been taking AZO until last night.   Upon evaluation, the pain was found to be dull, not sharp, and worsens after sitting for long periods.   The patient has regular bowel movements and is not constipated.   Ordered a urinalysis to check for infection and blood, and a CBC and creatinine test to assess kidney function.   If the urinalysis shows infection, the patient will be treated with antibiotics.  K21.9 GASTRO-ESOPHAGEAL REFLUX DISEASE WITHOUT  ESOPHAGITIS:   The patient's GERD is well controlled on omeprazole.  E78.2 MIXED HYPERLIPIDEMIA:   The patient is tolerating rosuvastatin for mixed hyperlipidemia.  M54.50 LOW BACK PAIN, UNSPECIFIED:   Patient reports lower back pain, described as a sharp, constant pain that worsens after sitting for long periods.   The pain is located above the left hip in the back.   Upon evaluation, the pain was found to be dull, not sharp.   Suspecting functional low back pain, recommended stretching exercises.  N20.9 URINARY CALCULUS, UNSPECIFIED:   The patient has a history of kidney stones, with the last occurrence in 1995.   The current pain is not as severe as the previous kidney stone.   Ordered a urinalysis and labs to assess for infection and kidney function.   If blood is found in the urine, a scan will be performed to look for a kidney stone.   Requested a CBC and creatinine test to further evaluate kidney function.   Advised the patient to schedule a follow-up if the tests return normal but the patient's condition does not improve.  N40.0 BENIGN PROSTATIC HYPERPLASIA WITHOUT LOWER URINARY TRACT SYMPTOMS:   The patient is taking finasteride for benign prostatic hyperplasia.  LIFESTYLE CHANGES:   Encouraged the patient to continue drinking water and performing stretching exercises for the back.   Educated the patient about the potential effects of AZO on urine color and test results.   Notified the patient of several available in-office and virtual appointments over the next few days.       1. Left flank pain  -     CREATININE, SERUM; Future; Expected date: 06/05/2024  -     CBC Auto Differential; Future; Expected date: 06/05/2024  -     Urinalysis, Reflex to Urine Culture Urine, Clean Catch    2. History of kidney stones  -     CREATININE, SERUM; Future; Expected date: 06/05/2024  -     CBC Auto Differential; Future; Expected date: 06/05/2024  -     Urinalysis, Reflex to Urine Culture Urine, Clean Catch    3. Hair  transplant status, elective  -     finasteride (PROPECIA) 1 mg tablet; Take 1 tablet (1 mg total) by mouth once daily.  Dispense: 90 tablet; Refill: 3    4. Androgenic alopecia  -     finasteride (PROPECIA) 1 mg tablet; Take 1 tablet (1 mg total) by mouth once daily.  Dispense: 90 tablet; Refill: 3    5. Essential hypertension  -     metoprolol succinate (TOPROL-XL) 25 MG 24 hr tablet; Take 1 tablet (25 mg total) by mouth every evening.  Dispense: 90 tablet; Refill: 3    6. Gastroesophageal reflux disease without esophagitis  -     omeprazole (PRILOSEC) 20 MG capsule; Take 1 capsule (20 mg total) by mouth every morning.  Dispense: 90 capsule; Refill: 3    7. Coronary artery disease involving native coronary artery of native heart without angina pectoris  -     rosuvastatin (CRESTOR) 40 MG Tab; Take 1 tablet (40 mg total) by mouth every evening.  Dispense: 90 tablet; Refill: 3    8. Aortic calcification  Overview:  CT Chest 2/17/2023 Findings  Coronary artery calcifications  Aortic calcifications without aneurysmal development    Orders:  -     rosuvastatin (CRESTOR) 40 MG Tab; Take 1 tablet (40 mg total) by mouth every evening.  Dispense: 90 tablet; Refill: 3    9. Mixed hyperlipidemia  -     rosuvastatin (CRESTOR) 40 MG Tab; Take 1 tablet (40 mg total) by mouth every evening.  Dispense: 90 tablet; Refill: 3    No other significant complaints or concerns were reported.  Today's visit involved the intricate management of episodic problem(s) and the ongoing care for the patient's serious or complex condition(s) listed above, reflecting the inherent complexity of providing longitudinal, comprehensive evaluation and management as the central hub for the patient's primary care services.  There were no vitals filed for this visit.  Physical Exam    Constitutional: No acute distress. Normal appearance. Not ill-appearing.  Pulmonary: Pulmonary effort is normal. No respiratory distress.  Skin: Skin is not  "jaundiced.  Neurological: Alert. Mental status is at baseline.  Psychiatric: Mood normal. Behavior normal. Thought content normal.         This note was generated with the assistance of ambient listening technology. Verbal consent was obtained by the patient and accompanying visitor(s) for the recording of patient appointment to facilitate this note. I attest to having reviewed and edited the generated note for accuracy, though some syntax or spelling errors may persist. Please contact the author of this note for any clarification.      TOTAL TIME evaluating and managing this patient for this encounter was greater than or equal to 17 minutes.  This time was exclusive of any separately billable procedures for this patient and exclusive of time spent treating any other patient.    Documentation entered by me for this encounter may have been done in part using speech-recognition technology. Although I have made an effort to ensure accuracy, "sound like" errors may exist and should be interpreted in context.    Each patient to whom medical services are provided by telemedicine is: (1) informed of the relationship between the physician and patient and the respective role of any other health care provider with respect to management of the patient; and (2) notified that he or she may decline to receive medical services by telemedicine and may withdraw from such care at any time.   "

## 2024-06-20 ENCOUNTER — PATIENT MESSAGE (OUTPATIENT)
Dept: PRIMARY CARE CLINIC | Facility: CLINIC | Age: 59
End: 2024-06-20
Payer: COMMERCIAL

## 2025-01-14 ENCOUNTER — OFFICE VISIT (OUTPATIENT)
Dept: INTERNAL MEDICINE | Facility: CLINIC | Age: 60
End: 2025-01-14
Payer: COMMERCIAL

## 2025-01-14 VITALS
WEIGHT: 202.63 LBS | DIASTOLIC BLOOD PRESSURE: 88 MMHG | HEART RATE: 71 BPM | OXYGEN SATURATION: 98 % | SYSTOLIC BLOOD PRESSURE: 126 MMHG | TEMPERATURE: 97 F | BODY MASS INDEX: 29.01 KG/M2 | HEIGHT: 70 IN

## 2025-01-14 DIAGNOSIS — I10 ESSENTIAL HYPERTENSION: Chronic | ICD-10-CM

## 2025-01-14 DIAGNOSIS — Z95.5 S/P CORONARY ARTERY STENT PLACEMENT: Chronic | ICD-10-CM

## 2025-01-14 DIAGNOSIS — L64.9 ANDROGENIC ALOPECIA: Chronic | ICD-10-CM

## 2025-01-14 DIAGNOSIS — Z12.5 SCREENING PSA (PROSTATE SPECIFIC ANTIGEN): ICD-10-CM

## 2025-01-14 DIAGNOSIS — H10.13 ALLERGIC CONJUNCTIVITIS OF BOTH EYES: Chronic | ICD-10-CM

## 2025-01-14 DIAGNOSIS — Z12.2 ENCOUNTER FOR SCREENING FOR LUNG CANCER: ICD-10-CM

## 2025-01-14 DIAGNOSIS — I25.10 CORONARY ARTERY DISEASE INVOLVING NATIVE CORONARY ARTERY OF NATIVE HEART WITHOUT ANGINA PECTORIS: Chronic | ICD-10-CM

## 2025-01-14 DIAGNOSIS — I70.0 AORTIC CALCIFICATION: Chronic | ICD-10-CM

## 2025-01-14 DIAGNOSIS — F10.21 ALCOHOL DEPENDENCE IN SUSTAINED FULL REMISSION: Chronic | ICD-10-CM

## 2025-01-14 DIAGNOSIS — K21.9 GASTROESOPHAGEAL REFLUX DISEASE WITHOUT ESOPHAGITIS: Chronic | ICD-10-CM

## 2025-01-14 DIAGNOSIS — R73.03 PREDIABETES: Chronic | ICD-10-CM

## 2025-01-14 DIAGNOSIS — E78.2 MIXED HYPERLIPIDEMIA: Chronic | ICD-10-CM

## 2025-01-14 DIAGNOSIS — Z87.891 FORMER SMOKER: Chronic | ICD-10-CM

## 2025-01-14 DIAGNOSIS — Z00.00 PREVENTATIVE HEALTH CARE: ICD-10-CM

## 2025-01-14 DIAGNOSIS — J30.1 SEASONAL ALLERGIC RHINITIS DUE TO POLLEN: Chronic | ICD-10-CM

## 2025-01-14 DIAGNOSIS — J41.0 SIMPLE CHRONIC BRONCHITIS: Primary | Chronic | ICD-10-CM

## 2025-01-14 PROCEDURE — 1159F MED LIST DOCD IN RCRD: CPT | Mod: CPTII,S$GLB,, | Performed by: FAMILY MEDICINE

## 2025-01-14 PROCEDURE — 3074F SYST BP LT 130 MM HG: CPT | Mod: CPTII,S$GLB,, | Performed by: FAMILY MEDICINE

## 2025-01-14 PROCEDURE — 99999 PR PBB SHADOW E&M-EST. PATIENT-LVL IV: CPT | Mod: PBBFAC,,, | Performed by: FAMILY MEDICINE

## 2025-01-14 PROCEDURE — 3008F BODY MASS INDEX DOCD: CPT | Mod: CPTII,S$GLB,, | Performed by: FAMILY MEDICINE

## 2025-01-14 PROCEDURE — 99214 OFFICE O/P EST MOD 30 MIN: CPT | Mod: S$GLB,,, | Performed by: FAMILY MEDICINE

## 2025-01-14 PROCEDURE — 3079F DIAST BP 80-89 MM HG: CPT | Mod: CPTII,S$GLB,, | Performed by: FAMILY MEDICINE

## 2025-01-14 PROCEDURE — G2211 COMPLEX E/M VISIT ADD ON: HCPCS | Mod: S$GLB,,, | Performed by: FAMILY MEDICINE

## 2025-01-14 RX ORDER — OMEPRAZOLE 20 MG/1
20 CAPSULE, DELAYED RELEASE ORAL EVERY MORNING
Qty: 90 CAPSULE | Refills: 3 | Status: SHIPPED | OUTPATIENT
Start: 2025-01-14

## 2025-01-14 RX ORDER — ASPIRIN 81 MG/1
81 TABLET ORAL DAILY
COMMUNITY
Start: 2025-01-14

## 2025-01-14 RX ORDER — FLUTICASONE PROPIONATE 50 MCG
2 SPRAY, SUSPENSION (ML) NASAL DAILY
Qty: 16 G | Refills: 11 | Status: SHIPPED | OUTPATIENT
Start: 2025-01-14

## 2025-01-14 RX ORDER — DESLORATADINE 5 MG/1
5 TABLET ORAL DAILY
Qty: 30 TABLET | Refills: 11 | Status: SHIPPED | OUTPATIENT
Start: 2025-01-14 | End: 2026-01-14

## 2025-01-14 RX ORDER — METOPROLOL SUCCINATE 25 MG/1
25 TABLET, EXTENDED RELEASE ORAL NIGHTLY
Qty: 90 TABLET | Refills: 3 | Status: SHIPPED | OUTPATIENT
Start: 2025-01-14 | End: 2026-01-14

## 2025-01-14 RX ORDER — AMOXICILLIN AND CLAVULANATE POTASSIUM 875; 125 MG/1; MG/1
1 TABLET, FILM COATED ORAL
COMMUNITY
Start: 2025-01-11 | End: 2025-01-18

## 2025-01-14 RX ORDER — ROSUVASTATIN CALCIUM 40 MG/1
40 TABLET, COATED ORAL NIGHTLY
Qty: 90 TABLET | Refills: 3 | Status: SHIPPED | OUTPATIENT
Start: 2025-01-14

## 2025-01-14 RX ORDER — ALBUTEROL SULFATE 90 UG/1
2 INHALANT RESPIRATORY (INHALATION) EVERY 6 HOURS PRN
Qty: 25.5 G | Refills: 11 | Status: SHIPPED | OUTPATIENT
Start: 2025-01-14 | End: 2026-01-14

## 2025-01-14 RX ORDER — OLOPATADINE HYDROCHLORIDE 1 MG/ML
1 SOLUTION/ DROPS OPHTHALMIC 2 TIMES DAILY
Qty: 5 ML | Refills: 11 | Status: SHIPPED | OUTPATIENT
Start: 2025-01-14

## 2025-01-14 RX ORDER — FINASTERIDE 1 MG/1
1 TABLET, FILM COATED ORAL DAILY
Qty: 90 TABLET | Refills: 3 | Status: SHIPPED | OUTPATIENT
Start: 2025-01-14

## 2025-01-14 NOTE — PROGRESS NOTES
"OFFICE VISIT 1/14/25  8:00 AM CST    CHIEF COMPLAINT: Check-up    He reports a recent virtual visit during which he was prescribed Augmentin for an unspecified upper respiratory infection. However, he does not report a significant increase in productive cough to suggest an acute exacerbation of chronic bronchitis. He notes an increased nonproductive cough and describes a "tickle in the back of his throat," which is worse at night. He states that he is already improving.     We reviewed his use of the albuterol inhaler, and his technique demonstrated a lack of understanding of proper usage. I provided education on the correct use of the inhaler and recommended using a spacer device. He mentioned recently getting a new cat, and since then, his allergies have worsened. He has chronic seasonal allergic rhinitis due to pollen and now also has allergic conjunctivitis, primarily from cat exposure. We discussed mitigation strategies. He has been taking Benadryl and Flonase without satisfactory symptom relief. We discussed treatment options and agreed to initiate the recommended treatments. If these fail to provide satisfactory relief, he will schedule a follow-up appointment to explore other treatment options or consider a referral to ENT for a second opinion.     His other chronic conditions appear compensated, controlled, and stable. He reports no new complaints or concerns and states he is doing well on his current medications without any adverse effects.    1. Simple chronic bronchitis  Assessment & Plan:  ASSESSMENT: This is a chronic problem that appears compensated/controlled and stable on albuterol PRN.  PLAN: Continue present treatment plan.     Orders:  -     albuterol (PROVENTIL HFA) 90 mcg/actuation inhaler; Inhale 2 puffs into the lungs every 6 (six) hours as needed for Wheezing. (Rescue inhaler)  Dispense: 25.5 g; Refill: 11  -     inhalation spacing device; Use as directed when taking inhaled medicine  " Dispense: 1 each; Refill: 0    2. Alcohol dependence in sustained full remission  Overview:  Sobriety Date = 25 SEP 2010.    Assessment & Plan:  He continues to go to AA meetings and is doing well.      3. Hair transplant status, elective  -     finasteride (PROPECIA) 1 mg tablet; Take 1 tablet (1 mg total) by mouth once daily.  Dispense: 90 tablet; Refill: 3    4. Androgenic alopecia  -     finasteride (PROPECIA) 1 mg tablet; Take 1 tablet (1 mg total) by mouth once daily.  Dispense: 90 tablet; Refill: 3    5. Essential hypertension  -     metoprolol succinate (TOPROL-XL) 25 MG 24 hr tablet; Take 1 tablet (25 mg total) by mouth every evening.  Dispense: 90 tablet; Refill: 3  -     LIPID PANEL; Future; Expected date: 02/16/2025  -     Comprehensive Metabolic Panel; Future; Expected date: 02/16/2025    6. Gastroesophageal reflux disease without esophagitis  -     omeprazole (PRILOSEC) 20 MG capsule; Take 1 capsule (20 mg total) by mouth every morning.  Dispense: 90 capsule; Refill: 3    7. Coronary artery disease involving native coronary artery of native heart without angina pectoris  -     rosuvastatin (CRESTOR) 40 MG Tab; Take 1 tablet (40 mg total) by mouth every evening.  Dispense: 90 tablet; Refill: 3  -     aspirin (ECOTRIN) 81 MG EC tablet; Take 1 tablet (81 mg total) by mouth once daily.    8. Aortic calcification  Overview:  CT Chest 2/17/2023 Findings  Coronary artery calcifications  Aortic calcifications without aneurysmal development    Orders:  -     rosuvastatin (CRESTOR) 40 MG Tab; Take 1 tablet (40 mg total) by mouth every evening.  Dispense: 90 tablet; Refill: 3    9. Mixed hyperlipidemia  -     rosuvastatin (CRESTOR) 40 MG Tab; Take 1 tablet (40 mg total) by mouth every evening.  Dispense: 90 tablet; Refill: 3    10. S/P coronary artery stent placement  -     metoprolol succinate (TOPROL-XL) 25 MG 24 hr tablet; Take 1 tablet (25 mg total) by mouth every evening.  Dispense: 90 tablet; Refill: 3  -      rosuvastatin (CRESTOR) 40 MG Tab; Take 1 tablet (40 mg total) by mouth every evening.  Dispense: 90 tablet; Refill: 3  -     aspirin (ECOTRIN) 81 MG EC tablet; Take 1 tablet (81 mg total) by mouth once daily.    11. Seasonal allergic rhinitis due to pollen  -     desloratadine (CLARINEX) 5 mg tablet; Take 1 tablet (5 mg total) by mouth once daily.  Dispense: 30 tablet; Refill: 11  -     fluticasone propionate (FLONASE) 50 mcg/actuation nasal spray; 2 sprays (100 mcg total) by Each Nostril route once daily.  Dispense: 16 g; Refill: 11    12. Allergic conjunctivitis of both eyes  -     desloratadine (CLARINEX) 5 mg tablet; Take 1 tablet (5 mg total) by mouth once daily.  Dispense: 30 tablet; Refill: 11  -     olopatadine (PATANOL) 0.1 % ophthalmic solution; Place 1 drop into both eyes 2 (two) times daily.  Dispense: 5 mL; Refill: 11    13. Former smoker  Overview:  Quit in 2010 after 45 pack-year smoking history.    Orders:  -     CT Chest Lung Screening Low Dose; Future; Expected date: 01/14/2025    14. Encounter for screening for lung cancer  -     CT Chest Lung Screening Low Dose; Future; Expected date: 01/14/2025    15. Preventative health care  -     LIPID PANEL; Future; Expected date: 02/16/2025  -     Comprehensive Metabolic Panel; Future; Expected date: 02/16/2025  -     PSA, SCREENING; Future; Expected date: 02/16/2025  -     Hemoglobin A1C; Future; Expected date: 02/16/2025    16. Screening PSA (prostate specific antigen)  -     PSA, SCREENING; Future; Expected date: 02/16/2025    17. Prediabetes  -     Hemoglobin A1C; Future; Expected date: 02/16/2025    No other significant complaints or concerns were reported.  Today's visit involved the intricate management of episodic problem(s) and the ongoing care for the patient's serious or complex condition(s) listed above, reflecting the inherent complexity of providing longitudinal, comprehensive evaluation and management as the central hub for the patient's  "primary care services.    Review of Systems   Constitutional:  Negative for activity change and unexpected weight change.   HENT:  Negative for hearing loss, rhinorrhea and trouble swallowing.    Eyes:  Negative for discharge and visual disturbance.   Respiratory:  Negative for chest tightness, shortness of breath and wheezing.    Cardiovascular:  Negative for chest pain and palpitations.   Gastrointestinal:  Negative for blood in stool, constipation and vomiting.   Endocrine: Negative for polydipsia and polyuria.   Genitourinary:  Negative for difficulty urinating, hematuria and urgency.   Musculoskeletal:  Negative for joint swelling.   Neurological:  Negative for weakness and headaches.   Psychiatric/Behavioral:  Negative for confusion and dysphoric mood.        Vitals:    01/14/25 0805   BP: 126/88   BP Location: Left arm   Patient Position: Sitting   Pulse: 71   Temp: 96.7 °F (35.9 °C)   TempSrc: Tympanic   SpO2: 98%   Weight: 91.9 kg (202 lb 9.6 oz)   Height: 5' 10" (1.778 m)   Physical Exam  Vitals reviewed.   Constitutional:       General: He is not in acute distress.     Appearance: Normal appearance. He is not ill-appearing, toxic-appearing or diaphoretic.   HENT:      Right Ear: Tympanic membrane, ear canal and external ear normal. There is no impacted cerumen.      Left Ear: Tympanic membrane, ear canal and external ear normal. There is no impacted cerumen.      Nose: No congestion or rhinorrhea.      Mouth/Throat:      Mouth: Mucous membranes are moist.      Pharynx: Oropharynx is clear. No oropharyngeal exudate or posterior oropharyngeal erythema.   Eyes:      General:         Right eye: No discharge.         Left eye: No discharge.   Neck:      Vascular: No carotid bruit.   Cardiovascular:      Rate and Rhythm: Normal rate and regular rhythm.      Heart sounds: Normal heart sounds.   Pulmonary:      Effort: Pulmonary effort is normal.      Breath sounds: Normal breath sounds.   Skin:     General: Skin " "is warm and dry.   Neurological:      Mental Status: He is alert and oriented to person, place, and time. Mental status is at baseline.   Psychiatric:         Behavior: Behavior normal.       Documentation entered by me for this encounter may have been done in part using speech-recognition technology. Although I have made an effort to ensure accuracy, "sound like" errors may exist and should be interpreted in context.    WRAP-UP INSTRUCTIONS  Schedule LDCT soon.  Schedule fasting labs on or soon after 16 FEB 2025.    FOR FOLLOW-UP AT NEXT APPOINTMENT  @[facility]: "Get to know you" visit. Follow up test results. Refills. Follow up with me in 6 to 12 months if he is doing well.  "

## 2025-01-14 NOTE — ASSESSMENT & PLAN NOTE
ASSESSMENT: This is a chronic problem that appears compensated/controlled and stable on albuterol PRN.  PLAN: Continue present treatment plan.

## 2025-01-31 ENCOUNTER — ON-DEMAND VIRTUAL (OUTPATIENT)
Dept: URGENT CARE | Facility: CLINIC | Age: 60
End: 2025-01-31
Payer: COMMERCIAL

## 2025-01-31 DIAGNOSIS — J06.9 URI WITH COUGH AND CONGESTION: Primary | ICD-10-CM

## 2025-01-31 PROCEDURE — 98005 SYNCH AUDIO-VIDEO EST LOW 20: CPT | Mod: 95,,, | Performed by: NURSE PRACTITIONER

## 2025-01-31 RX ORDER — PROMETHAZINE HYDROCHLORIDE AND DEXTROMETHORPHAN HYDROBROMIDE 6.25; 15 MG/5ML; MG/5ML
5 SYRUP ORAL EVERY 4 HOURS PRN
Qty: 118 ML | Refills: 0 | Status: SHIPPED | OUTPATIENT
Start: 2025-01-31 | End: 2025-02-10

## 2025-01-31 RX ORDER — AZELASTINE 1 MG/ML
1 SPRAY, METERED NASAL 2 TIMES DAILY
Qty: 30 ML | Refills: 0 | Status: SHIPPED | OUTPATIENT
Start: 2025-01-31 | End: 2025-03-02

## 2025-01-31 RX ORDER — BENZONATATE 200 MG/1
200 CAPSULE ORAL 3 TIMES DAILY PRN
Qty: 30 CAPSULE | Refills: 0 | Status: SHIPPED | OUTPATIENT
Start: 2025-01-31 | End: 2025-02-10

## 2025-01-31 NOTE — PROGRESS NOTES
Subjective:      Patient ID: Delon Elder is a 59 y.o. male.    Vitals:  vitals were not taken for this visit.     Chief Complaint: Sinus Problem      Visit Type: TELE AUDIOVISUAL    Patient Location: Home     Present with the patient at the time of consultation: TELEMED PRESENT WITH PATIENT: None    Past Medical History:   Diagnosis Date    Acute coronary syndrome     CAD (coronary artery disease) 04/13/2015    Essential hypertension     Hyperlipidemia     Simple chronic bronchitis 07/20/2015    Tear of medial meniscus of left knee, current 02/27/2018     Past Surgical History:   Procedure Laterality Date    APPENDECTOMY      CARDIAC CATHETERIZATION      COLONOSCOPY N/A 6/28/2023    Procedure: COLONOSCOPY;  Surgeon: Saul Worley MD;  Location: [facility];  Service: Endoscopy;  Laterality: N/A;    CORONARY ANGIOPLASTY       Review of patient's allergies indicates:   Allergen Reactions    Sulfa (sulfonamide antibiotics) Shortness Of Breath     Current Outpatient Medications on File Prior to Visit   Medication Sig Dispense Refill    albuterol (PROVENTIL HFA) 90 mcg/actuation inhaler Inhale 2 puffs into the lungs every 6 (six) hours as needed for Wheezing. (Rescue inhaler) 25.5 g 11    aspirin (ECOTRIN) 81 MG EC tablet Take 1 tablet (81 mg total) by mouth once daily.      desloratadine (CLARINEX) 5 mg tablet Take 1 tablet (5 mg total) by mouth once daily. 30 tablet 11    finasteride (PROPECIA) 1 mg tablet Take 1 tablet (1 mg total) by mouth once daily. 90 tablet 3    fluticasone propionate (FLONASE) 50 mcg/actuation nasal spray 2 sprays (100 mcg total) by Each Nostril route once daily. 16 g 11    inhalation spacing device Use as directed when taking inhaled medicine 1 each 0    Lactobacillus rhamnosus GG (CULTURELLE) 10 billion cell capsule Take 1 capsule by mouth once daily.      metoprolol succinate (TOPROL-XL) 25 MG 24 hr tablet Take 1 tablet (25 mg total) by mouth every evening. 90 tablet 3    olopatadine  (PATANOL) 0.1 % ophthalmic solution Place 1 drop into both eyes 2 (two) times daily. 5 mL 11    omeprazole (PRILOSEC) 20 MG capsule Take 1 capsule (20 mg total) by mouth every morning. 90 capsule 3    rosuvastatin (CRESTOR) 40 MG Tab Take 1 tablet (40 mg total) by mouth every evening. 90 tablet 3    valACYclovir (VALTREX) 1000 MG tablet Take 1 tablet (1,000 mg total) by mouth once daily. Start ASAP at onset of herpes symptoms. FIVE DOSES = FULL COURSE. (Patient not taking: Reported on 2025) 5 tablet 11     No current facility-administered medications on file prior to visit.     Family History   Problem Relation Name Age of Onset    Hyperlipidemia Mother      Hyperlipidemia Father      Melanoma Neg Hx      Psoriasis Neg Hx      Lupus Neg Hx         Medications Ordered                [facility] [address] [place] [id number] [address] [place] [address]   [id number] [address] [address]    Telephone: [phone]   Fax: [phone]   Hours: Not open 24 hours                         E-Prescribed (3 of 3)              azelastine (ASTELIN) 137 mcg (0.1 %) nasal spray    Si spray (137 mcg total) by Nasal route 2 (two) times daily.       Start: 25     Quantity: 30 mL Refills: 0                         benzonatate (TESSALON) 200 MG capsule    Sig: Take 1 capsule (200 mg total) by mouth 3 (three) times daily as needed for Cough.       Start: 25     Quantity: 30 capsule Refills: 0                         promethazine-dextromethorphan (PROMETHAZINE-DM) 6.25-15 mg/5 mL Syrp    Sig: Take 5 mLs by mouth every 4 (four) hours as needed (cough).       Start: 25     Quantity: 118 mL Refills: 0                           Ohs Peq Odvv Intake    2025  3:09 PM CST - Filed by Patient   What is your current physical address in the event of a medical emergency? [id number] [place]. [id number]   Are you able to take your vital signs? Yes   Systolic Blood Pressure: 125   Diastolic Blood Pressure:  80   Weight: 194   Height: 63   Pulse: 70   Temperature: 101   Respiration rate:    Pulse Oxygen:    Please attach any relevant images or files    Is your employer contracted with [facility]? No         60 y/o male with c/o cough, post nasal drip and SOB.         Constitution: Negative for chills and fever.   HENT:  Positive for congestion and postnasal drip.    Respiratory:  Positive for cough.         Objective:   The physical exam was conducted virtually.  Physical Exam   Constitutional: He is oriented to person, place, and time. He is cooperative. He does not appear ill. No distress. awake  HENT:   Head: Normocephalic and atraumatic.   Abdominal: Normal appearance.   Neurological: He is alert and oriented to person, place, and time.   Psychiatric: Judgment normal.       Assessment:     1. URI with cough and congestion        Plan:       URI with cough and congestion  -     azelastine (ASTELIN) 137 mcg (0.1 %) nasal spray; 1 spray (137 mcg total) by Nasal route 2 (two) times daily.  Dispense: 30 mL; Refill: 0  -     promethazine-dextromethorphan (PROMETHAZINE-DM) 6.25-15 mg/5 mL Syrp; Take 5 mLs by mouth every 4 (four) hours as needed (cough).  Dispense: 118 mL; Refill: 0  -     benzonatate (TESSALON) 200 MG capsule; Take 1 capsule (200 mg total) by mouth 3 (three) times daily as needed for Cough.  Dispense: 30 capsule; Refill: 0      Follow-up with Primary Care as discussed for further refills.     Patient encouraged to monitor symptoms closely and instructed to follow-up for new or worsening symptoms. Further, in-person, evaluation may be necessary for continued treatment. Please follow up with your primary care doctor or specialist as needed. Verbally discussed plan. Patient confirms understanding and is in agreement with treatment and plan.      You must understand that you've received a [facility] Care evaluation only and that you may be released before all your medical problems are known or treated.  You, the patient, will arrange for follow up care as instructed.

## 2025-02-17 ENCOUNTER — HOSPITAL ENCOUNTER (OUTPATIENT)
Dept: RADIOLOGY | Facility: HOSPITAL | Age: 60
Discharge: HOME | End: 2025-02-17
Attending: FAMILY MEDICINE
Payer: COMMERCIAL

## 2025-02-17 DIAGNOSIS — Z12.2 ENCOUNTER FOR SCREENING FOR LUNG CANCER: ICD-10-CM

## 2025-02-17 DIAGNOSIS — Z87.891 FORMER SMOKER: Chronic | ICD-10-CM

## 2025-02-17 PROCEDURE — 71271 CT THORAX LUNG CANCER SCR C-: CPT | Mod: TC

## 2025-02-17 PROCEDURE — 71271 CT THORAX LUNG CANCER SCR C-: CPT | Mod: 26,,, | Performed by: RADIOLOGY

## 2025-02-20 ENCOUNTER — RESULTS FOLLOW-UP (OUTPATIENT)
Dept: INTERNAL MEDICINE | Facility: CLINIC | Age: 60
End: 2025-02-20

## 2025-02-20 NOTE — PROGRESS NOTES
Hi, Delon  .    I'm proud of you for getting your lung cancer screening, and I'm happy that it did NOT show evidence of cancer.    You need to repeat your lung cancer screening in one year.    Thanks for letting me care f  or you, and thanks for trusting [facility] with your healthcare needs.    All the best,    Dr. WHYTE

## 2025-02-26 ENCOUNTER — PATIENT MESSAGE (OUTPATIENT)
Dept: INTERNAL MEDICINE | Facility: CLINIC | Age: 60
End: 2025-02-26

## 2025-02-26 ENCOUNTER — OFFICE VISIT (OUTPATIENT)
Dept: INTERNAL MEDICINE | Facility: CLINIC | Age: 60
End: 2025-02-26
Payer: COMMERCIAL

## 2025-02-26 VITALS
DIASTOLIC BLOOD PRESSURE: 80 MMHG | HEART RATE: 85 BPM | OXYGEN SATURATION: 96 % | BODY MASS INDEX: 27.84 KG/M2 | WEIGHT: 194.44 LBS | SYSTOLIC BLOOD PRESSURE: 114 MMHG | TEMPERATURE: 97 F | HEIGHT: 70 IN

## 2025-02-26 DIAGNOSIS — N50.812 LEFT TESTICULAR PAIN: ICD-10-CM

## 2025-02-26 DIAGNOSIS — R10.32 LEFT INGUINAL PAIN: Primary | ICD-10-CM

## 2025-02-26 PROCEDURE — 3079F DIAST BP 80-89 MM HG: CPT | Mod: CPTII,S$GLB,, | Performed by: NURSE PRACTITIONER

## 2025-02-26 PROCEDURE — 3008F BODY MASS INDEX DOCD: CPT | Mod: CPTII,S$GLB,, | Performed by: NURSE PRACTITIONER

## 2025-02-26 PROCEDURE — 1159F MED LIST DOCD IN RCRD: CPT | Mod: CPTII,S$GLB,, | Performed by: NURSE PRACTITIONER

## 2025-02-26 PROCEDURE — 1160F RVW MEDS BY RX/DR IN RCRD: CPT | Mod: CPTII,S$GLB,, | Performed by: NURSE PRACTITIONER

## 2025-02-26 PROCEDURE — 3044F HG A1C LEVEL LT 7.0%: CPT | Mod: CPTII,S$GLB,, | Performed by: NURSE PRACTITIONER

## 2025-02-26 PROCEDURE — G2211 COMPLEX E/M VISIT ADD ON: HCPCS | Mod: S$GLB,,, | Performed by: NURSE PRACTITIONER

## 2025-02-26 PROCEDURE — 99214 OFFICE O/P EST MOD 30 MIN: CPT | Mod: S$GLB,,, | Performed by: NURSE PRACTITIONER

## 2025-02-26 PROCEDURE — 3074F SYST BP LT 130 MM HG: CPT | Mod: CPTII,S$GLB,, | Performed by: NURSE PRACTITIONER

## 2025-02-26 PROCEDURE — 99999 PR PBB SHADOW E&M-EST. PATIENT-LVL V: CPT | Mod: PBBFAC,,, | Performed by: NURSE PRACTITIONER

## 2025-02-26 RX ORDER — NAPROXEN 500 MG/1
500 TABLET ORAL 2 TIMES DAILY WITH MEALS
Qty: 14 TABLET | Refills: 0 | Status: SHIPPED | OUTPATIENT
Start: 2025-02-26 | End: 2025-03-05

## 2025-02-27 ENCOUNTER — RESULTS FOLLOW-UP (OUTPATIENT)
Dept: INTERNAL MEDICINE | Facility: CLINIC | Age: 60
End: 2025-02-27

## 2025-02-27 ENCOUNTER — HOSPITAL ENCOUNTER (OUTPATIENT)
Dept: RADIOLOGY | Facility: HOSPITAL | Age: 60
Discharge: HOME | End: 2025-02-27
Attending: NURSE PRACTITIONER
Payer: COMMERCIAL

## 2025-02-27 DIAGNOSIS — N50.812 LEFT TESTICULAR PAIN: ICD-10-CM

## 2025-02-27 DIAGNOSIS — R10.32 LEFT INGUINAL PAIN: ICD-10-CM

## 2025-02-27 DIAGNOSIS — R10.32 LEFT INGUINAL PAIN: Primary | ICD-10-CM

## 2025-02-27 PROCEDURE — 76705 ECHO EXAM OF ABDOMEN: CPT | Mod: 26,,, | Performed by: RADIOLOGY

## 2025-02-27 PROCEDURE — 76870 US EXAM SCROTUM: CPT | Mod: TC

## 2025-02-27 PROCEDURE — 76705 ECHO EXAM OF ABDOMEN: CPT | Mod: TC

## 2025-02-27 PROCEDURE — 76870 US EXAM SCROTUM: CPT | Mod: 26,,, | Performed by: RADIOLOGY

## 2025-03-04 NOTE — PROGRESS NOTES
"  Subjective:      Patient ID: Delon Elder is a 59 y.o. male.    Chief Complaint: Inguinal Hernia    History of Present Illness    CHIEF COMPLAINT:  Delon presents today for evaluation of an inguinal hernia    HISTORY OF PRESENT ILLNESS:  He reports a hernia located in the scrotum that began approximately one month ago when he felt it "go down." He describes 3/10 pain that worsens with gas and manipulation of the area, such as when adjusting clothing after exam. The pain is more pronounced on the side of the scrotum. He denies significant food-related pain exacerbation.    SURGICAL HISTORY:  He has a history of bilateral inguinal hernia repairs with mesh placement, one of which was performed at [facility]. The exact timing of these surgeries is unclear.      ROS:  General: -fever, -chills, -fatigue, -weight gain, -weight loss  Eyes: -vision changes, -redness, -discharge  ENT: -ear pain, -nasal congestion, -sore throat  Cardiovascular: -chest pain, -palpitations, -lower extremity edema  Respiratory: -cough, -shortness of breath  Gastrointestinal: -abdominal pain, -nausea, -vomiting, -diarrhea, -constipation, -blood in stool  Genitourinary: -dysuria, -hematuria, -frequency  Musculoskeletal: -joint pain, +muscle pain  Skin: -rash, -lesion  Neurological: -headache, -dizziness, -numbness, -tingling  Psychiatric: -anxiety, -depression, -sleep difficulty          Problem List[1]    Current Medications[2]      Objective:   /80 (BP Location: Right arm, Patient Position: Sitting)   Pulse 85   Temp 96.7 °F (35.9 °C) (Tympanic)   Ht 5' 10" (1.778 m)   Wt 88.2 kg (194 lb 7.1 oz)   SpO2 96%   BMI 27.90 kg/m²     Physical Exam             Physical Exam  Vitals and nursing note reviewed. Exam conducted with a chaperone present.   Constitutional:       General: He is awake. He is not in acute distress.     Appearance: Normal appearance. He is not ill-appearing, toxic-appearing or diaphoretic. "   Cardiovascular:      Rate and Rhythm: Normal rate and regular rhythm.      Heart sounds: Normal heart sounds. No murmur heard.  Pulmonary:      Effort: Pulmonary effort is normal. No respiratory distress.      Breath sounds: Normal breath sounds. No stridor. No wheezing, rhonchi or rales.   Abdominal:      General: There is no distension.      Palpations: There is no mass.      Tenderness: There is no abdominal tenderness. There is no guarding.      Hernia: No hernia is present.   Genitourinary:     Penis: No erythema, tenderness, discharge, swelling or lesions.       Testes:         Right: Mass, tenderness, swelling, testicular hydrocele or varicocele not present. Right testis is descended. Cremasteric reflex is present.          Left: Tenderness and swelling present. Mass, testicular hydrocele or varicocele not present. Left testis is descended. Cremasteric reflex is present.       Epididymis:      Right: Normal.      Left: Normal.       Musculoskeletal:         General: Normal range of motion.      Cervical back: Normal range of motion.   Neurological:      Mental Status: He is alert.      Gait: Gait normal.   Psychiatric:         Attention and Perception: Attention normal.         Mood and Affect: Affect normal. Mood is anxious.         Speech: Speech normal.         Behavior: Behavior normal. Behavior is cooperative.         Cognition and Memory: Cognition normal.          Assessment/Plan :   1. Left inguinal pain  -     US Scrotum And Testicles; Future; Expected date: 02/26/2025  -     US Abdomen Limited_Hernia; Future; Expected date: 02/26/2025  -     Urinalysis, Reflex to Urine Culture Urine, Clean Catch; Future; Expected date: 02/26/2025  -     naproxen (NAPROSYN) 500 MG tablet; Take 1 tablet (500 mg total) by mouth 2 (two) times daily with meals. for 7 days  Dispense: 14 tablet; Refill: 0    2. Left testicular pain  -     US Scrotum And Testicles; Future; Expected date: 02/26/2025  -     US Abdomen  Limited_Hernia; Future; Expected date: 02/26/2025  -     Urinalysis, Reflex to Urine Culture Urine, Clean Catch; Future; Expected date: 02/26/2025  -     naproxen (NAPROSYN) 500 MG tablet; Take 1 tablet (500 mg total) by mouth 2 (two) times daily with meals. for 7 days  Dispense: 14 tablet; Refill: 0         Assessment & Plan    Suspected recurrent inguinal hernia based on patient's history and physical exam  Ordered stat ultrasound to confirm diagnosis and assess extent of herniation  Prescribed naproxen for pain management    RECURRENT INGUINAL HERNIA:  - Evaluated the patient's recurrent inguinal hernia that has fallen into the scrotum.  - Delon reports a pain level of 3, primarily when experiencing gas.  - Performed physical exam revealing visible asymmetry in the groin area, with tenderness on the left side.  - Ordered a stat ultrasound of the inguinal region for further evaluation.  - Discussed conservative measures for managing suspected hernia while awaiting ultrasound results.  - Recommend icing the area and elevation.  - Delon to apply ice to the affected area for 20 minutes at a time, at least 3 times daily.  - Delon to elevate the affected area using pillows.  - Delon to sleep on side.    GROIN PAIN:  - Evaluated the patient's ongoing pain in the groin area, which has recently worsened.  - Performed physical exam revealing tenderness in the groin area, particularly on the left side.  - Ordered a urine test for further evaluation.  - Ordered an ultrasound to further investigate the source of pain.    MEDICATIONS/SUPPLEMENTS:  - Prescribed naproxen for pain management.    FOLLOW UP:  - Instructed the patient to follow up for ultrasound as scheduled.  - Advised the patient to contact the office for further instructions after ultrasound results are available.          No follow-ups on file.    This note was generated with the assistance of ambient listening technology. Verbal consent was obtained by the  patient and accompanying visitor(s) for the recording of patient appointment to facilitate this note. I attest to having reviewed and edited the generated note for accuracy, though some syntax or spelling errors may persist. Please contact the author of this note for any clarification.       Answers submitted by the patient for this visit:  Review of Systems Questionnaire (Submitted on 2/26/2025)  activity change: No  unexpected weight change: No  neck pain: No  hearing loss: No  rhinorrhea: No  trouble swallowing: No  eye discharge: No  visual disturbance: No  chest tightness: No  wheezing: No  chest pain: No  palpitations: No  blood in stool: No  constipation: No  vomiting: No  diarrhea: No  polydipsia: No  polyuria: No  difficulty urinating: No  urgency: No  hematuria: No  joint swelling: No  arthralgias: No  headaches: No  weakness: No  confusion: No  dysphoric mood: No         [1]   Patient Active Problem List  Diagnosis    S/P coronary artery stent placement    Former smoker    Coronary artery disease involving native coronary artery of native heart without angina pectoris    MI, old    Simple chronic bronchitis    Essential hypertension    Herpes simplex infection of genitourinary system    Gastroesophageal reflux disease without esophagitis    Pulmonary granuloma    Right kidney stone    Aortic calcification    Prediabetes    Androgenic alopecia    Hair transplant status, elective    Overweight (BMI 25.0-29.9)    Mixed hyperlipidemia    Alcohol dependence in sustained full remission    Seasonal allergic rhinitis due to pollen   [2]   Current Outpatient Medications:     albuterol (PROVENTIL HFA) 90 mcg/actuation inhaler, Inhale 2 puffs into the lungs every 6 (six) hours as needed for Wheezing. (Rescue inhaler), Disp: 25.5 g, Rfl: 11    aspirin (ECOTRIN) 81 MG EC tablet, Take 1 tablet (81 mg total) by mouth once daily., Disp: , Rfl:     azelastine (ASTELIN) 137 mcg (0.1 %) nasal spray, 1 spray (137 mcg total)  by Nasal route 2 (two) times daily., Disp: 30 mL, Rfl: 0    desloratadine (CLARINEX) 5 mg tablet, Take 1 tablet (5 mg total) by mouth once daily., Disp: 30 tablet, Rfl: 11    finasteride (PROPECIA) 1 mg tablet, Take 1 tablet (1 mg total) by mouth once daily., Disp: 90 tablet, Rfl: 3    fluticasone propionate (FLONASE) 50 mcg/actuation nasal spray, 2 sprays (100 mcg total) by Each Nostril route once daily., Disp: 16 g, Rfl: 11    Lactobacillus rhamnosus GG (CULTURELLE) 10 billion cell capsule, Take 1 capsule by mouth once daily., Disp: , Rfl:     metoprolol succinate (TOPROL-XL) 25 MG 24 hr tablet, Take 1 tablet (25 mg total) by mouth every evening., Disp: 90 tablet, Rfl: 3    olopatadine (PATANOL) 0.1 % ophthalmic solution, Place 1 drop into both eyes 2 (two) times daily., Disp: 5 mL, Rfl: 11    omeprazole (PRILOSEC) 20 MG capsule, Take 1 capsule (20 mg total) by mouth every morning., Disp: 90 capsule, Rfl: 3    inhalation spacing device, Use as directed when taking inhaled medicine, Disp: 1 each, Rfl: 0    naproxen (NAPROSYN) 500 MG tablet, Take 1 tablet (500 mg total) by mouth 2 (two) times daily with meals. for 7 days, Disp: 14 tablet, Rfl: 0    rosuvastatin (CRESTOR) 40 MG Tab, TAKE 1 TABLET(40 MG) BY MOUTH EVERY EVENING, Disp: 90 tablet, Rfl: 3    valACYclovir (VALTREX) 1000 MG tablet, Take 1 tablet (1,000 mg total) by mouth once daily. Start ASAP at onset of herpes symptoms. FIVE DOSES = FULL COURSE. (Patient not taking: Reported on 2/26/2025), Disp: 5 tablet, Rfl: 11

## 2025-03-13 DIAGNOSIS — K21.9 GASTROESOPHAGEAL REFLUX DISEASE WITHOUT ESOPHAGITIS: Chronic | ICD-10-CM

## 2025-03-13 RX ORDER — OMEPRAZOLE 20 MG/1
20 CAPSULE, DELAYED RELEASE ORAL EVERY MORNING
Qty: 90 CAPSULE | Refills: 3 | Status: SHIPPED | OUTPATIENT
Start: 2025-03-13

## 2025-03-13 NOTE — TELEPHONE ENCOUNTER
No care due was identified.  [facility] Embedded Care Due Messages. Reference number: [id number].   3/13/2025 9:27:30 AM CDT

## 2025-03-13 NOTE — TELEPHONE ENCOUNTER
Delon Elder  is requesting a refill authorization.  Brief Assessment and Rationale for Refill:  Approve     Medication Therapy Plan:         Comments:     Note composed:10:30 AM 03/13/2025

## 2025-03-28 ENCOUNTER — TELEPHONE (OUTPATIENT)
Dept: DERMATOLOGY | Facility: CLINIC | Age: 60
End: 2025-03-28
Payer: COMMERCIAL

## 2025-03-28 NOTE — TELEPHONE ENCOUNTER
Returned call. Pt scheduled to see dr. Thompson next week.   ----- Message from Marybel sent at 3/28/2025 10:16 AM CDT -----  Contact: Delon  Type:  Sooner Apoointment RequestCaller is requesting a sooner appointment.  Caller declined first available appointment listed below.  Caller will not accept being placed on the waitlist and is requesting a message be sent to doctor.Name of Caller:Lorenzo is the first available appointment?05-02Symptoms:warts on wrist Would the patient rather a call back or a response via [facility]? Call back Best Call Back Number:[phone] Information:

## 2025-04-09 ENCOUNTER — OFFICE VISIT (OUTPATIENT)
Dept: DERMATOLOGY | Facility: CLINIC | Age: 60
End: 2025-04-09
Payer: COMMERCIAL

## 2025-04-09 DIAGNOSIS — L82.1 SEBORRHEIC KERATOSIS: Primary | ICD-10-CM

## 2025-04-09 PROCEDURE — 3044F HG A1C LEVEL LT 7.0%: CPT | Mod: CPTII,S$GLB,, | Performed by: DERMATOLOGY

## 2025-04-09 PROCEDURE — 99999 PR PBB SHADOW E&M-EST. PATIENT-LVL III: CPT | Mod: PBBFAC,,, | Performed by: DERMATOLOGY

## 2025-04-09 PROCEDURE — 99212 OFFICE O/P EST SF 10 MIN: CPT | Mod: S$GLB,,, | Performed by: DERMATOLOGY

## 2025-04-09 PROCEDURE — 1160F RVW MEDS BY RX/DR IN RCRD: CPT | Mod: CPTII,S$GLB,, | Performed by: DERMATOLOGY

## 2025-04-09 PROCEDURE — 1159F MED LIST DOCD IN RCRD: CPT | Mod: CPTII,S$GLB,, | Performed by: DERMATOLOGY

## 2025-04-09 NOTE — PATIENT INSTRUCTIONS

## 2025-04-09 NOTE — PROGRESS NOTES
Subjective:      Patient ID:  Delon Elder is a 59 y.o. male who presents for   Chief Complaint   Patient presents with    Spot     Pt reports having a wart on right wrist.  Present about 6 months. Pt has tried compound w and other otc medicines to get rid of it. Reports it has gotten smaller but not gone away.       Hx of EIC of the left upper back and EIC of the right trunk, last seen on 7/19/22. Here today for new issue:    History of Present Illness: The patient presents with chief complaint of wart.  Location: right arm  Duration: several months  Signs/Symptoms: crusted, growing    Prior treatments: can of air at an angle      Spot        Review of Systems   Constitutional:  Negative for fever and chills.   Gastrointestinal:  Negative for nausea and vomiting.   Skin:  Positive for activity-related sunscreen use. Negative for daily sunscreen use and recent sunburn.   Hematologic/Lymphatic: Does not bruise/bleed easily.       Objective:   Physical Exam   Constitutional: He appears well-developed and well-nourished. No distress.   Neurological: He is alert and oriented to person, place, and time. He is not disoriented.   Psychiatric: He has a normal mood and affect.   Skin:   Areas Examined (abnormalities noted in diagram):   Head / Face Inspection Performed  Neck Inspection Performed  Chest / Axilla Inspection Performed  RUE Inspected  LUE Inspection Performed  Nails and Digits Inspection Performed            Diagram Legend      Pigmented verrucoid papule/plaque c/w seborrheic keratosis     Assessment / Plan:        Seborrheic keratosis  Reassurance given. Discussed diagnosis and that lesions are benign.  [facility] handout given.              Follow up if symptoms worsen or fail to improve.

## 2025-04-29 DIAGNOSIS — A60.00 HERPES SIMPLEX INFECTION OF GENITOURINARY SYSTEM: Chronic | ICD-10-CM

## 2025-04-29 RX ORDER — VALACYCLOVIR HYDROCHLORIDE 1 G/1
1000 TABLET, FILM COATED ORAL DAILY
Qty: 5 TABLET | Refills: 8 | Status: SHIPPED | OUTPATIENT
Start: 2025-04-29

## 2025-04-29 NOTE — TELEPHONE ENCOUNTER
Refill Decision Note   Delon Jerrod  is requesting a refill authorization.  Brief Assessment and Rationale for Refill:  Approve     Medication Therapy Plan:  Labs waived for PRN meds (VALACYCLOVIR)      Comments:     Note composed:11:30 AM 04/29/2025

## 2025-04-29 NOTE — TELEPHONE ENCOUNTER
No care due was identified.  [facility] Embedded Care Due Messages. Reference number: [id number].   4/29/2025 10:08:23 AM CDT

## 2025-05-22 DIAGNOSIS — A60.00 HERPES SIMPLEX INFECTION OF GENITOURINARY SYSTEM: Chronic | ICD-10-CM

## 2025-05-22 NOTE — TELEPHONE ENCOUNTER
Care Due:                  Date            Visit Type   Department     Provider  --------------------------------------------------------------------------------                                MYCHART                              FOLLOWUP/OF  [facility] INTERNAL  Last Visit: 01-      [facility] VISIT   MEDICINE       Henrik Clark  Next Visit: None Scheduled  None         None Found                                                            Last  Test          Frequency    Reason                     Performed    Due Date  --------------------------------------------------------------------------------    CBC.........  12 months..  valACYclovir.............  06- 05-    [facility] Embedded Care Due Messages. Reference number: [id number].   5/22/2025 5:24:02 PM CDT

## 2025-05-23 RX ORDER — VALACYCLOVIR HYDROCHLORIDE 1 G/1
1000 TABLET, FILM COATED ORAL DAILY
Qty: 5 TABLET | Refills: 8 | Status: SHIPPED | OUTPATIENT
Start: 2025-05-23

## 2025-05-23 NOTE — TELEPHONE ENCOUNTER
REFILL REQUEST APPROVED.  Requested Prescriptions     Pending Prescriptions Disp Refills    valACYclovir (VALTREX) 1000 MG tablet 5 tablet 8     Sig: Take 1 tablet (1,000 mg total) by mouth once daily. Start ASAP at onset of herpes symptoms. FIVE DOSES = FULL COURSE.

## 2025-06-15 DIAGNOSIS — A60.00 HERPES SIMPLEX INFECTION OF GENITOURINARY SYSTEM: Chronic | ICD-10-CM

## 2025-06-16 NOTE — TELEPHONE ENCOUNTER
No care due was identified.  [facility] Embedded Care Due Messages. Reference number: [id number].   6/15/2025 7:53:08 PM CDT

## 2025-06-17 ENCOUNTER — PATIENT MESSAGE (OUTPATIENT)
Dept: INTERNAL MEDICINE | Facility: CLINIC | Age: 60
End: 2025-06-17
Payer: COMMERCIAL

## 2025-06-17 RX ORDER — VALACYCLOVIR HYDROCHLORIDE 1 G/1
1000 TABLET, FILM COATED ORAL DAILY
Qty: 10 TABLET | Refills: 5 | Status: SHIPPED | OUTPATIENT
Start: 2025-06-17

## 2025-06-17 NOTE — TELEPHONE ENCOUNTER
"He asked, "Can I get more days?"    The appropriate valacyclovir dose for episodic treatment of genital HSV is 1000 mg daily for 5 days.  Five tablets equals a full course. I gave him a prescription for 10 tablets and 5 refills. If he is needing more than that, then it would probably be worth putting him on daily valacyclovir for suppression therapy. If he wants that, he can request that through an e-visit.   "

## 2025-08-03 DIAGNOSIS — I25.10 CORONARY ARTERY DISEASE INVOLVING NATIVE CORONARY ARTERY OF NATIVE HEART WITHOUT ANGINA PECTORIS: Chronic | ICD-10-CM

## 2025-08-03 DIAGNOSIS — E78.2 MIXED HYPERLIPIDEMIA: Chronic | ICD-10-CM

## 2025-08-03 DIAGNOSIS — L64.9 ANDROGENIC ALOPECIA: Chronic | ICD-10-CM

## 2025-08-03 DIAGNOSIS — I70.0 AORTIC CALCIFICATION: Chronic | ICD-10-CM

## 2025-08-03 DIAGNOSIS — K21.9 GASTROESOPHAGEAL REFLUX DISEASE WITHOUT ESOPHAGITIS: Chronic | ICD-10-CM

## 2025-08-03 DIAGNOSIS — Z95.5 S/P CORONARY ARTERY STENT PLACEMENT: Chronic | ICD-10-CM

## 2025-08-03 DIAGNOSIS — A60.00 HERPES SIMPLEX INFECTION OF GENITOURINARY SYSTEM: Chronic | ICD-10-CM

## 2025-08-03 NOTE — TELEPHONE ENCOUNTER
Care Due:                  Date            Visit Type   Department     Provider  --------------------------------------------------------------------------------                                MYCHART                              FOLLOWUP/OF  [facility] INTERNAL  Last Visit: 01-      [facility] VISIT   MEDICINE       Henrik Clark  Next Visit: None Scheduled  None         None Found                                                            Last  Test          Frequency    Reason                     Performed    Due Date  --------------------------------------------------------------------------------    CBC.........  12 months..  valACYclovir.............  06- 05-    [facility] Embedded Care Due Messages. Reference number: [id number].   8/03/2025 6:04:28 PM CDT

## 2025-08-04 RX ORDER — ROSUVASTATIN CALCIUM 40 MG/1
40 TABLET, COATED ORAL NIGHTLY
Qty: 90 TABLET | Refills: 1 | Status: SHIPPED | OUTPATIENT
Start: 2025-08-04

## 2025-08-04 RX ORDER — FINASTERIDE 1 MG/1
1 TABLET, FILM COATED ORAL DAILY
Qty: 90 TABLET | Refills: 1 | Status: SHIPPED | OUTPATIENT
Start: 2025-08-04

## 2025-08-04 RX ORDER — OMEPRAZOLE 20 MG/1
20 CAPSULE, DELAYED RELEASE ORAL EVERY MORNING
Qty: 90 CAPSULE | Refills: 1 | Status: SHIPPED | OUTPATIENT
Start: 2025-08-04

## 2025-08-04 RX ORDER — VALACYCLOVIR HYDROCHLORIDE 1 G/1
1000 TABLET, FILM COATED ORAL DAILY
Qty: 10 TABLET | Refills: 2 | Status: SHIPPED | OUTPATIENT
Start: 2025-08-04

## 2025-08-05 NOTE — TELEPHONE ENCOUNTER
Provider Staff:  Action required for this patient     Please see care gap opportunities below in Care Due Message.    Thanks!  [facility]     Appointments      Date Provider   Last Visit   1/14/2025 GERRY Clark MD   Next Visit   Visit date not found GERRY Clark MD      Refill Decision Note   Delon Elder  is requesting a refill authorization.  Brief Assessment and Rationale for Refill:  Approve     Medication Therapy Plan:  Labs not required for Valtrex PRN usage      Comments:     Note composed:7:14 PM 08/04/2025

## 2025-08-30 DIAGNOSIS — I10 ESSENTIAL HYPERTENSION: Chronic | ICD-10-CM

## 2025-08-30 DIAGNOSIS — A60.00 HERPES SIMPLEX INFECTION OF GENITOURINARY SYSTEM: Chronic | ICD-10-CM

## 2025-08-30 DIAGNOSIS — Z95.5 S/P CORONARY ARTERY STENT PLACEMENT: Chronic | ICD-10-CM

## 2025-08-31 RX ORDER — METOPROLOL SUCCINATE 25 MG/1
25 TABLET, EXTENDED RELEASE ORAL NIGHTLY
Qty: 90 TABLET | Refills: 1 | Status: SHIPPED | OUTPATIENT
Start: 2025-08-31

## 2025-09-02 RX ORDER — VALACYCLOVIR HYDROCHLORIDE 1 G/1
1000 TABLET, FILM COATED ORAL DAILY
Qty: 10 TABLET | Refills: 2 | Status: SHIPPED | OUTPATIENT
Start: 2025-09-02

## 2025-09-05 ENCOUNTER — HOSPITAL ENCOUNTER (OUTPATIENT)
Dept: PREADMISSION TESTING | Facility: HOSPITAL | Age: 60
Discharge: HOME | End: 2025-09-05
Attending: INTERNAL MEDICINE
Payer: COMMERCIAL